# Patient Record
Sex: FEMALE | Race: WHITE | NOT HISPANIC OR LATINO | Employment: FULL TIME | URBAN - METROPOLITAN AREA
[De-identification: names, ages, dates, MRNs, and addresses within clinical notes are randomized per-mention and may not be internally consistent; named-entity substitution may affect disease eponyms.]

---

## 2017-07-24 ENCOUNTER — TRANSCRIBE ORDERS (OUTPATIENT)
Dept: ADMINISTRATIVE | Facility: HOSPITAL | Age: 62
End: 2017-07-24

## 2017-07-24 DIAGNOSIS — Z12.31 VISIT FOR SCREENING MAMMOGRAM: Primary | ICD-10-CM

## 2017-08-01 ENCOUNTER — HOSPITAL ENCOUNTER (OUTPATIENT)
Dept: RADIOLOGY | Facility: HOSPITAL | Age: 62
Discharge: HOME | End: 2017-08-01
Attending: INTERNAL MEDICINE
Payer: COMMERCIAL

## 2017-08-01 ENCOUNTER — TRANSCRIBE ORDERS (OUTPATIENT)
Dept: ADMINISTRATIVE | Facility: HOSPITAL | Age: 62
End: 2017-08-01

## 2017-08-01 ENCOUNTER — HOSPITAL ENCOUNTER (OUTPATIENT)
Dept: RADIOLOGY | Facility: HOSPITAL | Age: 62
Discharge: HOME | End: 2017-08-01
Attending: OBSTETRICS & GYNECOLOGY
Payer: COMMERCIAL

## 2017-08-01 DIAGNOSIS — M79.7 RHEUMATISM, UNSPECIFIED AND FIBROSITIS: Primary | ICD-10-CM

## 2017-08-01 DIAGNOSIS — M79.0 RHEUMATISM, UNSPECIFIED AND FIBROSITIS: Primary | ICD-10-CM

## 2017-08-01 DIAGNOSIS — M79.673 PAIN OF FOOT, UNSPECIFIED LATERALITY: ICD-10-CM

## 2017-08-01 DIAGNOSIS — Z12.31 VISIT FOR SCREENING MAMMOGRAM: ICD-10-CM

## 2017-08-01 PROCEDURE — 73630 X-RAY EXAM OF FOOT: CPT

## 2017-08-01 PROCEDURE — G0202 SCR MAMMO BI INCL CAD: HCPCS

## 2018-11-21 ENCOUNTER — TRANSCRIBE ORDERS (OUTPATIENT)
Dept: ADMINISTRATIVE | Facility: HOSPITAL | Age: 63
End: 2018-11-21

## 2018-11-21 DIAGNOSIS — Z12.31 ENCOUNTER FOR SCREENING MAMMOGRAM FOR MALIGNANT NEOPLASM OF BREAST: Primary | ICD-10-CM

## 2018-12-05 ENCOUNTER — HOSPITAL ENCOUNTER (OUTPATIENT)
Dept: RADIOLOGY | Facility: HOSPITAL | Age: 63
Discharge: HOME | End: 2018-12-05
Attending: OBSTETRICS & GYNECOLOGY
Payer: COMMERCIAL

## 2018-12-05 VITALS — BODY MASS INDEX: 41.83 KG/M2 | HEIGHT: 64 IN | WEIGHT: 245 LBS

## 2018-12-05 DIAGNOSIS — Z12.31 ENCOUNTER FOR SCREENING MAMMOGRAM FOR MALIGNANT NEOPLASM OF BREAST: ICD-10-CM

## 2018-12-05 PROCEDURE — 77067 SCR MAMMO BI INCL CAD: CPT

## 2019-05-10 ENCOUNTER — OFFICE VISIT (OUTPATIENT)
Dept: OBGYN CLINIC | Facility: CLINIC | Age: 64
End: 2019-05-10
Payer: COMMERCIAL

## 2019-05-10 ENCOUNTER — APPOINTMENT (OUTPATIENT)
Dept: RADIOLOGY | Facility: CLINIC | Age: 64
End: 2019-05-10
Payer: COMMERCIAL

## 2019-05-10 VITALS
WEIGHT: 238.4 LBS | BODY MASS INDEX: 40.7 KG/M2 | HEART RATE: 108 BPM | DIASTOLIC BLOOD PRESSURE: 73 MMHG | SYSTOLIC BLOOD PRESSURE: 111 MMHG | HEIGHT: 64 IN

## 2019-05-10 DIAGNOSIS — M17.11 PRIMARY OSTEOARTHRITIS OF RIGHT KNEE: Primary | ICD-10-CM

## 2019-05-10 DIAGNOSIS — M25.561 RIGHT KNEE PAIN, UNSPECIFIED CHRONICITY: ICD-10-CM

## 2019-05-10 PROCEDURE — 20610 DRAIN/INJ JOINT/BURSA W/O US: CPT | Performed by: ORTHOPAEDIC SURGERY

## 2019-05-10 PROCEDURE — 99203 OFFICE O/P NEW LOW 30 MIN: CPT | Performed by: ORTHOPAEDIC SURGERY

## 2019-05-10 PROCEDURE — 73562 X-RAY EXAM OF KNEE 3: CPT

## 2019-05-10 RX ORDER — LOSARTAN POTASSIUM 50 MG/1
TABLET ORAL
COMMUNITY
Start: 2019-04-27 | End: 2021-04-14

## 2019-05-10 RX ORDER — BUPIVACAINE HYDROCHLORIDE 5 MG/ML
6 INJECTION, SOLUTION EPIDURAL; INTRACAUDAL
Status: COMPLETED | OUTPATIENT
Start: 2019-05-10 | End: 2019-05-10

## 2019-05-10 RX ORDER — TRIAMCINOLONE ACETONIDE 40 MG/ML
80 INJECTION, SUSPENSION INTRA-ARTICULAR; INTRAMUSCULAR
Status: COMPLETED | OUTPATIENT
Start: 2019-05-10 | End: 2019-05-10

## 2019-05-10 RX ORDER — GABAPENTIN 100 MG/1
CAPSULE ORAL
COMMUNITY
Start: 2019-05-06 | End: 2021-04-14

## 2019-05-10 RX ORDER — PANTOPRAZOLE SODIUM 40 MG/1
TABLET, DELAYED RELEASE ORAL
COMMUNITY
Start: 2019-03-09

## 2019-05-10 RX ORDER — SITAGLIPTIN AND METFORMIN HYDROCHLORIDE 1000; 50 MG/1; MG/1
TABLET, FILM COATED ORAL
COMMUNITY
Start: 2019-04-09

## 2019-05-10 RX ORDER — DULOXETIN HYDROCHLORIDE 60 MG/1
CAPSULE, DELAYED RELEASE ORAL
COMMUNITY
Start: 2019-03-17

## 2019-05-10 RX ORDER — CYCLOBENZAPRINE HCL 10 MG
TABLET ORAL
COMMUNITY
Start: 2019-05-06 | End: 2021-04-14

## 2019-05-10 RX ADMIN — TRIAMCINOLONE ACETONIDE 80 MG: 40 INJECTION, SUSPENSION INTRA-ARTICULAR; INTRAMUSCULAR at 11:20

## 2019-05-10 RX ADMIN — BUPIVACAINE HYDROCHLORIDE 6 ML: 5 INJECTION, SOLUTION EPIDURAL; INTRACAUDAL at 11:20

## 2019-08-09 ENCOUNTER — OFFICE VISIT (OUTPATIENT)
Dept: OBGYN CLINIC | Facility: CLINIC | Age: 64
End: 2019-08-09
Payer: COMMERCIAL

## 2019-08-09 VITALS
SYSTOLIC BLOOD PRESSURE: 109 MMHG | WEIGHT: 237.6 LBS | HEART RATE: 82 BPM | BODY MASS INDEX: 40.56 KG/M2 | HEIGHT: 64 IN | DIASTOLIC BLOOD PRESSURE: 65 MMHG

## 2019-08-09 DIAGNOSIS — M25.561 CHRONIC PAIN OF RIGHT KNEE: ICD-10-CM

## 2019-08-09 DIAGNOSIS — E11.9 CONTROLLED TYPE 2 DIABETES MELLITUS WITHOUT COMPLICATION, WITHOUT LONG-TERM CURRENT USE OF INSULIN (HCC): ICD-10-CM

## 2019-08-09 DIAGNOSIS — G89.29 CHRONIC PAIN OF RIGHT KNEE: ICD-10-CM

## 2019-08-09 DIAGNOSIS — M17.11 PRIMARY OSTEOARTHRITIS OF RIGHT KNEE: Primary | ICD-10-CM

## 2019-08-09 PROBLEM — E78.49 OTHER HYPERLIPIDEMIA: Status: ACTIVE | Noted: 2019-08-09

## 2019-08-09 PROBLEM — K21.9 GASTROESOPHAGEAL REFLUX DISEASE WITHOUT ESOPHAGITIS: Status: ACTIVE | Noted: 2019-08-09

## 2019-08-09 PROBLEM — Z96.652 HISTORY OF LEFT KNEE REPLACEMENT: Status: ACTIVE | Noted: 2019-08-09

## 2019-08-09 PROBLEM — I10 ESSENTIAL HYPERTENSION: Status: ACTIVE | Noted: 2019-08-09

## 2019-08-09 PROCEDURE — 99213 OFFICE O/P EST LOW 20 MIN: CPT | Performed by: PHYSICIAN ASSISTANT

## 2019-08-09 PROCEDURE — 20610 DRAIN/INJ JOINT/BURSA W/O US: CPT | Performed by: PHYSICIAN ASSISTANT

## 2019-08-09 RX ORDER — BUPIVACAINE HYDROCHLORIDE 5 MG/ML
6 INJECTION, SOLUTION EPIDURAL; INTRACAUDAL
Status: COMPLETED | OUTPATIENT
Start: 2019-08-09 | End: 2019-08-09

## 2019-08-09 RX ORDER — TRIAMCINOLONE ACETONIDE 40 MG/ML
80 INJECTION, SUSPENSION INTRA-ARTICULAR; INTRAMUSCULAR
Status: COMPLETED | OUTPATIENT
Start: 2019-08-09 | End: 2019-08-09

## 2019-08-09 RX ADMIN — TRIAMCINOLONE ACETONIDE 80 MG: 40 INJECTION, SUSPENSION INTRA-ARTICULAR; INTRAMUSCULAR at 15:39

## 2019-08-09 RX ADMIN — BUPIVACAINE HYDROCHLORIDE 6 ML: 5 INJECTION, SOLUTION EPIDURAL; INTRACAUDAL at 15:39

## 2019-08-09 NOTE — PROGRESS NOTES
Assessment/Plan:  1  Primary osteoarthritis of right knee  Large joint arthrocentesis   2  Chronic pain of right knee  Large joint arthrocentesis   3  Controlled type 2 diabetes mellitus without complication, without long-term current use of insulin ([facility] )       Colonel Barnard is a pleasant 72-year-old female presenting today for follow-up of her activity related right knee pain due to her moderate underlying osteoarthritis   She did very well with a previous cortisone injection 3 months ago and did not have significant increase in her blood sugars  The injection lasted until last weekend, when the pain returned to the level it was prior to the previous injection  She consented to and underwent a repeat right knee cortisone injection today without difficulty or complication  Post injection instructions were provided  We will plan to see her back in 3-4 months pending the efficacy of today's injection  All of her questions were addressed today  Large joint arthrocentesis: R knee  Date/Time: 8/9/2019 3:39 PM  Consent given by: patient  Site marked: site marked  Timeout: Immediately prior to procedure a time out was called to verify the correct patient, procedure, equipment, support staff and site/side marked as required   Supporting Documentation  Indications: pain   Procedure Details  Location: knee - R knee  Preparation: Patient was prepped and draped in the usual sterile fashion  Needle size: 20 G  Ultrasound guidance: no  Approach: anterolateral  Medications administered: 6 mL bupivacaine (PF) 0 5 %; 80 mg triamcinolone acetonide 40 mg/mL    Patient tolerance: patient tolerated the procedure well with no immediate complications  Dressing:  Sterile dressing applied          Subjective: Right knee follow up    Patient ID: Eldon Thierry is a 60 y o  female  Colonel Barnard is a pleasant 72-year-old female presenting today for follow-up of her activity related right knee pain    She underwent a right knee cortisone injection for her underlying moderate arthritis 3 months ago, which lasted up until last weekend  She is interested in undergoing a repeat injection today as she has her daughter's wedding coming up in early October  She denies any new injuries or symptoms  The pain has returned to the level that it was prior to the last injection  Review of Systems   Constitutional: Negative  HENT: Negative  Eyes: Negative  Respiratory: Negative  Cardiovascular: Negative  Gastrointestinal: Negative  Endocrine: Negative  Genitourinary: Negative  Musculoskeletal: Positive for arthralgias  Skin: Negative  Allergic/Immunologic: Negative  Neurological: Negative  Hematological: Negative  Psychiatric/Behavioral: Negative            Past Medical History:   Diagnosis Date    Cardiac disease     Diabetes mellitus (HCC)     GERD (gastroesophageal reflux disease)        Past Surgical History:   Procedure Laterality Date    CARDIAC PACEMAKER PLACEMENT       SECTION      x 2    JOINT REPLACEMENT         Family History   Problem Relation Age of Onset    Parkinsonism Mother     No Known Problems Father     No Known Problems Sister     No Known Problems Brother     No Known Problems Maternal Aunt     No Known Problems Maternal Uncle     No Known Problems Paternal Aunt     No Known Problems Paternal Uncle     No Known Problems Maternal Grandmother     No Known Problems Maternal Grandfather     No Known Problems Paternal Grandmother     No Known Problems Paternal Grandfather     ADD / ADHD Neg Hx     Anesthesia problems Neg Hx     Cancer Neg Hx     Clotting disorder Neg Hx     Collagen disease Neg Hx     Diabetes Neg Hx     Dislocations Neg Hx     Learning disabilities Neg Hx     Neurological problems Neg Hx     Osteoporosis Neg Hx     Rheumatologic disease Neg Hx     Scoliosis Neg Hx     Vascular Disease Neg Hx        Social History     Occupational History    Not on file   Tobacco Use    Smoking status: Never Smoker    Smokeless tobacco: Never Used   Substance and Sexual Activity    Alcohol use: No     Comment: socail     Drug use: No    Sexual activity: Not on file         Current Outpatient Medications:     aspirin 81 mg chewable tablet, Chew 81 mg daily  , Disp: , Rfl:     cyclobenzaprine (FLEXERIL) 10 mg tablet, , Disp: , Rfl:     DULoxetine (CYMBALTA) 60 mg delayed release capsule, , Disp: , Rfl:     gabapentin (NEURONTIN) 100 mg capsule, , Disp: , Rfl:     JANUMET  MG per tablet, , Disp: , Rfl:     losartan (COZAAR) 50 mg tablet, , Disp: , Rfl:     metoprolol succinate (TOPROL-XL) 50 mg 24 hr tablet, Take 50 mg by mouth daily  50 mg in am and 25 mg in the evening, Disp: , Rfl:     pantoprazole (PROTONIX) 40 mg tablet, , Disp: , Rfl:     simvastatin (ZOCOR) 20 mg tablet, Take 20 mg by mouth daily at bedtime  , Disp: , Rfl:     Allergies   Allergen Reactions    Codeine GI Intolerance    Morphine     Penicillins Other (See Comments)     unknown       Objective:  Vitals:    08/09/19 1519   BP: 109/65   Pulse: 82       Body mass index is 40 78 kg/m²  Right Knee Exam     Muscle Strength   The patient has normal right knee strength  Tenderness   The patient is experiencing tenderness in the medial joint line  Range of Motion   Extension:  0 normal   Flexion:  130 normal     Tests   Perla:  Medial - negative Lateral - negative  Varus: negative Valgus: negative  Drawer:  Anterior - negative    Posterior - negative  Patellar apprehension: negative    Other   Erythema: absent  Scars: absent  Sensation: normal  Pulse: present  Swelling: none  Effusion: no effusion present    Comments:  Peripatellar crepitus  Increased adipost tissues around right knee   + patellofemoral grind   Knee stable at 0, 30, 90 degrees  Ambulates with a normal symmetrical gait              Observations     Right Knee   Negative for effusion         Physical Exam Constitutional: She is oriented to person, place, and time  She appears well-developed and well-nourished  Body mass index is 40 78 kg/m²  HENT:   Head: Normocephalic and atraumatic  Eyes: EOM are normal    Neck: Normal range of motion  Cardiovascular: Intact distal pulses  Pulmonary/Chest: Effort normal    Musculoskeletal:        Right knee: She exhibits no effusion  See ortho exam   Neurological: She is alert and oriented to person, place, and time  Skin: Skin is warm and dry  Psychiatric: She has a normal mood and affect  Her behavior is normal  Judgment and thought content normal    Nursing note and vitals reviewed

## 2019-09-24 ENCOUNTER — OFFICE VISIT (OUTPATIENT)
Dept: OBGYN CLINIC | Facility: CLINIC | Age: 64
End: 2019-09-24
Payer: COMMERCIAL

## 2019-09-24 VITALS
SYSTOLIC BLOOD PRESSURE: 132 MMHG | DIASTOLIC BLOOD PRESSURE: 79 MMHG | HEIGHT: 64 IN | BODY MASS INDEX: 41.15 KG/M2 | WEIGHT: 241 LBS | HEART RATE: 92 BPM

## 2019-09-24 DIAGNOSIS — M17.11 PRIMARY OSTEOARTHRITIS OF RIGHT KNEE: ICD-10-CM

## 2019-09-24 DIAGNOSIS — M70.51 PES ANSERINUS BURSITIS OF RIGHT KNEE: Primary | ICD-10-CM

## 2019-09-24 DIAGNOSIS — M25.561 CHRONIC PAIN OF RIGHT KNEE: ICD-10-CM

## 2019-09-24 DIAGNOSIS — G89.29 CHRONIC PAIN OF RIGHT KNEE: ICD-10-CM

## 2019-09-24 PROCEDURE — 99213 OFFICE O/P EST LOW 20 MIN: CPT | Performed by: ORTHOPAEDIC SURGERY

## 2019-09-24 NOTE — PROGRESS NOTES
Assessment/Plan:  1  Pes anserinus bursitis of right knee  diclofenac sodium (VOLTAREN) 1 %    Brace   2  Primary osteoarthritis of right knee  diclofenac sodium (VOLTAREN) 1 %    Brace   3  Chronic pain of right knee  diclofenac sodium (VOLTAREN) 1 %    Brace     Anna is a pleasant 68-year-old female seen today for follow-up of her activity related right knee pain  Although she does have moderate to severe underlying osteoarthritis, her current complaints are most consistent with pes anserine bursitis and MCL sprain  The cortisone injection from 6 weeks ago is likely still effective is she does not have lingering activity related soreness in the knee  Due to her history of kidney disease, she cannot take oral NSAIDs  Therefore, we have prescribed her Voltaren gel to use up to 4 times a day on the affected area  Additionally, we will have her fitted for a hinged knee brace to wear while active  She expressed understanding  We will plan to see her back in a month for clinical re-evaluation  All questions addressed  Subjective: Right knee follow up    Patient ID: Devyn Silva is a 60 y o  female  Josef Mejia is a pleasant 68-year-old female presenting today for follow-up of her activity related right knee pain  We have been treating her osteoarthritis with periodic cortisone injections  Her last injection was 6 weeks ago  She did see improvement, but has had intermittent medial-sided discomfort  She has her with bending the knee, stairs, and he leaning over  She gets pain shooting from the medial side of her knee up towards her thigh  She denies any locking, buckling, or giving way  She denies any new specific injuries  Review of Systems   Constitutional: Negative  HENT: Negative  Eyes: Negative  Respiratory: Negative  Cardiovascular: Negative  Gastrointestinal: Negative  Endocrine: Negative  Genitourinary: Negative  Musculoskeletal: Positive for arthralgias  Skin: Negative  Allergic/Immunologic: Negative  Neurological: Negative  Hematological: Negative  Psychiatric/Behavioral: Negative  Past Medical History:   Diagnosis Date    Cardiac disease     Diabetes mellitus (HCC)     GERD (gastroesophageal reflux disease)        Past Surgical History:   Procedure Laterality Date    CARDIAC PACEMAKER PLACEMENT       SECTION      x 2    JOINT REPLACEMENT         Family History   Problem Relation Age of Onset    Parkinsonism Mother     No Known Problems Father     No Known Problems Sister     No Known Problems Brother     No Known Problems Maternal Aunt     No Known Problems Maternal Uncle     No Known Problems Paternal Aunt     No Known Problems Paternal Uncle     No Known Problems Maternal Grandmother     No Known Problems Maternal Grandfather     No Known Problems Paternal Grandmother     No Known Problems Paternal Grandfather     ADD / ADHD Neg Hx     Anesthesia problems Neg Hx     Cancer Neg Hx     Clotting disorder Neg Hx     Collagen disease Neg Hx     Diabetes Neg Hx     Dislocations Neg Hx     Learning disabilities Neg Hx     Neurological problems Neg Hx     Osteoporosis Neg Hx     Rheumatologic disease Neg Hx     Scoliosis Neg Hx     Vascular Disease Neg Hx        Social History     Occupational History    Not on file   Tobacco Use    Smoking status: Never Smoker    Smokeless tobacco: Never Used   Substance and Sexual Activity    Alcohol use: No     Comment: socail     Drug use: No    Sexual activity: Not on file         Current Outpatient Medications:     aspirin 81 mg chewable tablet, Chew 81 mg daily  , Disp: , Rfl:     cyclobenzaprine (FLEXERIL) 10 mg tablet, , Disp: , Rfl:     DULoxetine (CYMBALTA) 60 mg delayed release capsule, , Disp: , Rfl:     gabapentin (NEURONTIN) 100 mg capsule, , Disp: , Rfl:     JANUMET  MG per tablet, , Disp: , Rfl:     losartan (COZAAR) 50 mg tablet, , Disp: , Rfl:     metoprolol succinate (TOPROL-XL) 50 mg 24 hr tablet, Take 50 mg by mouth daily  50 mg in am and 25 mg in the evening, Disp: , Rfl:     pantoprazole (PROTONIX) 40 mg tablet, , Disp: , Rfl:     simvastatin (ZOCOR) 20 mg tablet, Take 20 mg by mouth daily at bedtime  , Disp: , Rfl:     diclofenac sodium (VOLTAREN) 1 %, Apply 2 g topically 4 (four) times a day, Disp: 1 Tube, Rfl: 1    Allergies   Allergen Reactions    Codeine GI Intolerance    Morphine     Penicillins Other (See Comments)     unknown       Objective:  Vitals:    09/24/19 1553   BP: 132/79   Pulse: 92       Body mass index is 41 37 kg/m²  Right Knee Exam     Muscle Strength   The patient has normal right knee strength  Tenderness   The patient is experiencing tenderness in the medial joint line and pes anserinus  Range of Motion   Extension:  0 normal   Flexion:  120 normal     Tests   Perla:  Medial - negative Lateral - negative  Varus: negative Valgus: negative  Drawer:  Anterior - negative    Posterior - negative  Patellar apprehension: negative    Other   Erythema: absent  Scars: absent  Sensation: normal  Pulse: present  Swelling: none  Effusion: no effusion present    Comments:  Peripatellar crepitus  Increased adipose tissues around right knee   - patellofemoral grind   Knee stable at 0, 30, 90 degrees  Ambulates with a normal symmetrical gait          Observations     Right Knee   Negative for effusion  Physical Exam   Constitutional: She is oriented to person, place, and time  She appears well-developed and well-nourished  Body mass index is 41 37 kg/m²  HENT:   Head: Normocephalic and atraumatic  Eyes: EOM are normal    Neck: Normal range of motion  Cardiovascular: Intact distal pulses  Pulmonary/Chest: Effort normal    Musculoskeletal:        Right knee: She exhibits no effusion  See ortho exam   Neurological: She is alert and oriented to person, place, and time  Skin: Skin is warm and dry  Psychiatric: She has a normal mood and affect  Her behavior is normal  Judgment and thought content normal    Nursing note and vitals reviewed

## 2019-12-06 ENCOUNTER — TRANSCRIBE ORDERS (OUTPATIENT)
Dept: ADMINISTRATIVE | Facility: HOSPITAL | Age: 64
End: 2019-12-06

## 2019-12-06 DIAGNOSIS — Z12.31 SCREENING MAMMOGRAM FOR HIGH-RISK PATIENT: Primary | ICD-10-CM

## 2020-01-17 ENCOUNTER — OFFICE VISIT (OUTPATIENT)
Dept: OBGYN CLINIC | Facility: CLINIC | Age: 65
End: 2020-01-17
Payer: COMMERCIAL

## 2020-01-17 VITALS
DIASTOLIC BLOOD PRESSURE: 75 MMHG | HEART RATE: 91 BPM | SYSTOLIC BLOOD PRESSURE: 114 MMHG | HEIGHT: 64 IN | BODY MASS INDEX: 40.97 KG/M2 | WEIGHT: 240 LBS

## 2020-01-17 DIAGNOSIS — M70.51 PES ANSERINUS BURSITIS OF RIGHT KNEE: ICD-10-CM

## 2020-01-17 DIAGNOSIS — E11.9 CONTROLLED TYPE 2 DIABETES MELLITUS WITHOUT COMPLICATION, WITHOUT LONG-TERM CURRENT USE OF INSULIN (HCC): ICD-10-CM

## 2020-01-17 DIAGNOSIS — G89.29 CHRONIC PAIN OF RIGHT KNEE: ICD-10-CM

## 2020-01-17 DIAGNOSIS — M25.561 CHRONIC PAIN OF RIGHT KNEE: ICD-10-CM

## 2020-01-17 DIAGNOSIS — M70.50 PES ANSERINE BURSITIS: ICD-10-CM

## 2020-01-17 DIAGNOSIS — M17.11 PRIMARY OSTEOARTHRITIS OF RIGHT KNEE: Primary | ICD-10-CM

## 2020-01-17 PROCEDURE — 99213 OFFICE O/P EST LOW 20 MIN: CPT | Performed by: PHYSICIAN ASSISTANT

## 2020-01-17 PROCEDURE — 20610 DRAIN/INJ JOINT/BURSA W/O US: CPT | Performed by: PHYSICIAN ASSISTANT

## 2020-01-17 RX ORDER — TRIAMCINOLONE ACETONIDE 40 MG/ML
80 INJECTION, SUSPENSION INTRA-ARTICULAR; INTRAMUSCULAR
Status: COMPLETED | OUTPATIENT
Start: 2020-01-17 | End: 2020-01-17

## 2020-01-17 RX ORDER — BUPIVACAINE HYDROCHLORIDE 5 MG/ML
6 INJECTION, SOLUTION EPIDURAL; INTRACAUDAL
Status: COMPLETED | OUTPATIENT
Start: 2020-01-17 | End: 2020-01-17

## 2020-01-17 RX ADMIN — TRIAMCINOLONE ACETONIDE 80 MG: 40 INJECTION, SUSPENSION INTRA-ARTICULAR; INTRAMUSCULAR at 14:36

## 2020-01-17 RX ADMIN — BUPIVACAINE HYDROCHLORIDE 6 ML: 5 INJECTION, SOLUTION EPIDURAL; INTRACAUDAL at 14:36

## 2020-01-17 NOTE — PROGRESS NOTES
Assessment/Plan:  1  Primary osteoarthritis of right knee  Large joint arthrocentesis: R knee    diclofenac sodium (VOLTAREN) 1 %   2  Chronic pain of right knee  Large joint arthrocentesis: R knee    diclofenac sodium (VOLTAREN) 1 %   3  Pes anserine bursitis     4  Controlled type 2 diabetes mellitus without complication, without long-term current use of insulin ([address] )     5  Pes anserinus bursitis of right knee  diclofenac sodium (VOLTAREN) 1 %     Emilia Romo is a pleasant 73-year-old female presenting today for follow-up of her activity related right knee pain due to her moderate underlying osteoarthritis and pes anserine bursitis  She has been doing well with periodic injection therapy as well as Voltaren gel for the pes anserine bursitis  The gel was refilled for her today  Additionally, she consented to and underwent a right knee cortisone injection as detailed below, which she tolerated well without difficulty or complication  Post injection instructions were provided  She is aware there may cause a transient increase in her blood sugars  We will plan to see her back in 3-4 months pending the efficacy of today's injection  She expressed understanding all of her questions were addressed today      Large joint arthrocentesis: R knee  Date/Time: 1/17/2020 2:36 PM  Consent given by: patient  Site marked: site marked  Timeout: Immediately prior to procedure a time out was called to verify the correct patient, procedure, equipment, support staff and site/side marked as required   Supporting Documentation  Indications: pain   Procedure Details  Location: knee - R knee  Preparation: Patient was prepped and draped in the usual sterile fashion  Needle size: 20 G  Ultrasound guidance: no  Approach: anterolateral  Medications administered: 6 mL bupivacaine (PF) 0 5 %; 80 mg triamcinolone acetonide 40 mg/mL    Patient tolerance: patient tolerated the procedure well with no immediate complications  Dressing:  Sterile dressing applied            Subjective:  Right knee follow-up    Patient ID: Adrian Galeano is a 60 y o  female  Trang Beltran is a pleasant 46-year-old female presenting today for follow-up her activity related right knee pain due to her underlying moderate osteoarthritis and pes anserine bursitis  She has done very well with Voltaren gel since her last appointment, and is using at 2 to 3 times a day as needed  Additionally, she did very well from the cortisone injection that was administered  last year  However, she has had a recurrence of her activity related discomfort over the past week that has not been mitigated with Voltaren gel  She is interested today in a repeat cortisone injection if possible  Review of Systems   Constitutional: Negative  HENT: Negative  Eyes: Negative  Respiratory: Negative  Cardiovascular: Negative  Gastrointestinal: Negative  Endocrine: Negative  Genitourinary: Negative  Musculoskeletal: Positive for myalgias  Skin: Negative  Allergic/Immunologic: Negative  Neurological: Negative  Hematological: Negative  Psychiatric/Behavioral: Negative            Past Medical History:   Diagnosis Date    Cardiac disease     Diabetes mellitus (HCC)     GERD (gastroesophageal reflux disease)        Past Surgical History:   Procedure Laterality Date    CARDIAC PACEMAKER PLACEMENT       SECTION      x 2    JOINT REPLACEMENT         Family History   Problem Relation Age of Onset    Parkinsonism Mother     No Known Problems Father     No Known Problems Sister     No Known Problems Brother     No Known Problems Maternal Aunt     No Known Problems Maternal Uncle     No Known Problems Paternal Aunt     No Known Problems Paternal Uncle     No Known Problems Maternal Grandmother     No Known Problems Maternal Grandfather     No Known Problems Paternal Grandmother     No Known Problems Paternal Grandfather     ADD / ADHD Neg Hx     Anesthesia problems Neg Hx     Cancer Neg Hx     Clotting disorder Neg Hx     Collagen disease Neg Hx     Diabetes Neg Hx     Dislocations Neg Hx     Learning disabilities Neg Hx     Neurological problems Neg Hx     Osteoporosis Neg Hx     Rheumatologic disease Neg Hx     Scoliosis Neg Hx     Vascular Disease Neg Hx        Social History     Occupational History    Not on file   Tobacco Use    Smoking status: Never Smoker    Smokeless tobacco: Never Used   Substance and Sexual Activity    Alcohol use: No     Comment: socail     Drug use: No    Sexual activity: Not on file         Current Outpatient Medications:     aspirin 81 mg chewable tablet, Chew 81 mg daily  , Disp: , Rfl:     cyclobenzaprine (FLEXERIL) 10 mg tablet, , Disp: , Rfl:     diclofenac sodium (VOLTAREN) 1 %, Apply 2 g topically 4 (four) times a day, Disp: 1 Tube, Rfl: 1    DULoxetine (CYMBALTA) 60 mg delayed release capsule, , Disp: , Rfl:     gabapentin (NEURONTIN) 100 mg capsule, , Disp: , Rfl:     JANUMET  MG per tablet, , Disp: , Rfl:     losartan (COZAAR) 50 mg tablet, , Disp: , Rfl:     metoprolol succinate (TOPROL-XL) 50 mg 24 hr tablet, Take 50 mg by mouth daily  50 mg in am and 25 mg in the evening, Disp: , Rfl:     pantoprazole (PROTONIX) 40 mg tablet, , Disp: , Rfl:     simvastatin (ZOCOR) 20 mg tablet, Take 20 mg by mouth daily at bedtime  , Disp: , Rfl:     Allergies   Allergen Reactions    Codeine GI Intolerance    Morphine     Penicillins Other (See Comments)     unknown       Objective:  Vitals:    01/17/20 1411   BP: 114/75   Pulse: 91       Body mass index is 41 2 kg/m²  Right Knee Exam     Muscle Strength   The patient has normal right knee strength  Tenderness   The patient is experiencing tenderness in the medial joint line, pes anserinus, patella and medial retinaculum      Range of Motion   Extension:  0 normal   Flexion:  120 normal     Tests   Perla:  Medial - negative Lateral - negative  Varus: negative Valgus: negative  Drawer:  Anterior - negative    Posterior - negative  Patellar apprehension: negative    Other   Erythema: absent  Scars: absent  Sensation: normal  Pulse: present  Swelling: none  Effusion: no effusion present    Comments:  Peripatellar crepitus  Increased adipose tissues around right knee, no appreciable effusion  - patellofemoral grind   Knee stable at 0, 30, 90 degrees  Ambulates with a normal symmetrical gait          Observations     Right Knee   Negative for effusion  Physical Exam   Constitutional: She is oriented to person, place, and time  She appears well-developed and well-nourished  Body mass index is 41 2 kg/m²  HENT:   Head: Normocephalic and atraumatic  Eyes: EOM are normal    Neck: Normal range of motion  Cardiovascular: Intact distal pulses  Pulmonary/Chest: Effort normal    Musculoskeletal:        Right knee: She exhibits no effusion  See ortho exam   Neurological: She is alert and oriented to person, place, and time  Skin: Skin is warm and dry  Psychiatric: She has a normal mood and affect  Her behavior is normal  Judgment and thought content normal    Nursing note and vitals reviewed

## 2020-01-20 ENCOUNTER — TRANSCRIBE ORDERS (OUTPATIENT)
Dept: ADMINISTRATIVE | Facility: HOSPITAL | Age: 65
End: 2020-01-20

## 2020-10-21 ENCOUNTER — HOSPITAL ENCOUNTER (EMERGENCY)
Facility: HOSPITAL | Age: 65
Discharge: HOME | End: 2020-10-21
Attending: EMERGENCY MEDICINE | Admitting: EMERGENCY MEDICINE
Payer: COMMERCIAL

## 2020-10-21 ENCOUNTER — APPOINTMENT (EMERGENCY)
Dept: RADIOLOGY | Facility: HOSPITAL | Age: 65
End: 2020-10-21
Payer: COMMERCIAL

## 2020-10-21 VITALS
RESPIRATION RATE: 16 BRPM | HEART RATE: 60 BPM | OXYGEN SATURATION: 98 % | DIASTOLIC BLOOD PRESSURE: 78 MMHG | SYSTOLIC BLOOD PRESSURE: 132 MMHG | TEMPERATURE: 97.2 F

## 2020-10-21 DIAGNOSIS — M25.561 RIGHT KNEE PAIN: Primary | ICD-10-CM

## 2020-10-21 DIAGNOSIS — M19.90 OSTEOARTHRITIS: ICD-10-CM

## 2020-10-21 PROCEDURE — 96372 THER/PROPH/DIAG INJ SC/IM: CPT

## 2020-10-21 PROCEDURE — 99283 EMERGENCY DEPT VISIT LOW MDM: CPT

## 2020-10-21 PROCEDURE — 73564 X-RAY EXAM KNEE 4 OR MORE: CPT

## 2020-10-21 PROCEDURE — 99284 EMERGENCY DEPT VISIT MOD MDM: CPT | Performed by: EMERGENCY MEDICINE

## 2020-10-21 RX ORDER — LIDOCAINE 50 MG/G
1 PATCH TOPICAL ONCE
Status: DISCONTINUED | OUTPATIENT
Start: 2020-10-21 | End: 2020-10-21 | Stop reason: HOSPADM

## 2020-10-21 RX ORDER — KETOROLAC TROMETHAMINE 30 MG/ML
15 INJECTION, SOLUTION INTRAMUSCULAR; INTRAVENOUS ONCE
Status: COMPLETED | OUTPATIENT
Start: 2020-10-21 | End: 2020-10-21

## 2020-10-21 RX ADMIN — LIDOCAINE 1 PATCH: 50 PATCH TOPICAL at 17:47

## 2020-10-21 RX ADMIN — KETOROLAC TROMETHAMINE 15 MG: 30 INJECTION, SOLUTION INTRAMUSCULAR at 17:48

## 2020-10-22 ENCOUNTER — OFFICE VISIT (OUTPATIENT)
Dept: OBGYN CLINIC | Facility: CLINIC | Age: 65
End: 2020-10-22
Payer: COMMERCIAL

## 2020-10-22 VITALS
SYSTOLIC BLOOD PRESSURE: 126 MMHG | WEIGHT: 234 LBS | HEART RATE: 63 BPM | HEIGHT: 64 IN | DIASTOLIC BLOOD PRESSURE: 74 MMHG | BODY MASS INDEX: 39.95 KG/M2

## 2020-10-22 DIAGNOSIS — M25.561 CHRONIC PAIN OF RIGHT KNEE: ICD-10-CM

## 2020-10-22 DIAGNOSIS — M17.11 PRIMARY OSTEOARTHRITIS OF RIGHT KNEE: Primary | ICD-10-CM

## 2020-10-22 DIAGNOSIS — G89.29 CHRONIC PAIN OF RIGHT KNEE: ICD-10-CM

## 2020-10-22 PROCEDURE — 20610 DRAIN/INJ JOINT/BURSA W/O US: CPT | Performed by: ORTHOPAEDIC SURGERY

## 2020-10-22 PROCEDURE — 99214 OFFICE O/P EST MOD 30 MIN: CPT | Performed by: ORTHOPAEDIC SURGERY

## 2020-10-22 RX ORDER — TRIAMCINOLONE ACETONIDE 40 MG/ML
80 INJECTION, SUSPENSION INTRA-ARTICULAR; INTRAMUSCULAR
Status: COMPLETED | OUTPATIENT
Start: 2020-10-22 | End: 2020-10-22

## 2020-10-22 RX ORDER — BUPIVACAINE HYDROCHLORIDE 5 MG/ML
6 INJECTION, SOLUTION EPIDURAL; INTRACAUDAL
Status: COMPLETED | OUTPATIENT
Start: 2020-10-22 | End: 2020-10-22

## 2020-10-22 RX ADMIN — BUPIVACAINE HYDROCHLORIDE 6 ML: 5 INJECTION, SOLUTION EPIDURAL; INTRACAUDAL at 11:23

## 2020-10-22 RX ADMIN — TRIAMCINOLONE ACETONIDE 80 MG: 40 INJECTION, SUSPENSION INTRA-ARTICULAR; INTRAMUSCULAR at 11:23

## 2020-11-17 ENCOUNTER — HOSPITAL ENCOUNTER (OUTPATIENT)
Dept: RADIOLOGY | Facility: HOSPITAL | Age: 65
Discharge: HOME | End: 2020-11-17
Attending: OBSTETRICS & GYNECOLOGY
Payer: COMMERCIAL

## 2020-11-17 VITALS — BODY MASS INDEX: 37.15 KG/M2 | WEIGHT: 223 LBS | HEIGHT: 65 IN

## 2020-11-17 DIAGNOSIS — Z12.31 SCREENING MAMMOGRAM FOR HIGH-RISK PATIENT: ICD-10-CM

## 2020-11-17 PROCEDURE — 77063 BREAST TOMOSYNTHESIS BI: CPT

## 2020-11-17 PROCEDURE — 77067 SCR MAMMO BI INCL CAD: CPT

## 2020-11-19 ENCOUNTER — APPOINTMENT (EMERGENCY)
Dept: RADIOLOGY | Facility: HOSPITAL | Age: 65
End: 2020-11-19
Payer: COMMERCIAL

## 2020-11-19 ENCOUNTER — HOSPITAL ENCOUNTER (EMERGENCY)
Facility: HOSPITAL | Age: 65
Discharge: HOME | End: 2020-11-19
Attending: EMERGENCY MEDICINE | Admitting: EMERGENCY MEDICINE
Payer: COMMERCIAL

## 2020-11-19 VITALS
RESPIRATION RATE: 18 BRPM | TEMPERATURE: 97.3 F | HEART RATE: 61 BPM | SYSTOLIC BLOOD PRESSURE: 126 MMHG | OXYGEN SATURATION: 98 % | DIASTOLIC BLOOD PRESSURE: 56 MMHG
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
## 2020-11-19 DIAGNOSIS — R51.9 HEADACHE: ICD-10-CM

## 2020-11-19 DIAGNOSIS — M25.551 RIGHT HIP PAIN: ICD-10-CM

## 2020-11-19 PROCEDURE — 71046 X-RAY EXAM CHEST 2 VIEWS: CPT

## 2020-11-19 PROCEDURE — 99284 EMERGENCY DEPT VISIT MOD MDM: CPT

## 2020-11-19 PROCEDURE — 73502 X-RAY EXAM HIP UNI 2-3 VIEWS: CPT

## 2020-11-19 PROCEDURE — 99284 EMERGENCY DEPT VISIT MOD MDM: CPT | Performed by: EMERGENCY MEDICINE

## 2020-11-19 PROCEDURE — 70450 CT HEAD/BRAIN W/O DYE: CPT

## 2020-11-19 PROCEDURE — 72125 CT NECK SPINE W/O DYE: CPT

## 2020-11-19 PROCEDURE — G1004 CDSM NDSC: HCPCS

## 2020-11-19 RX ORDER — CYCLOBENZAPRINE HCL 10 MG
10 TABLET ORAL ONCE
Status: COMPLETED | OUTPATIENT
Start: 2020-11-19 | End: 2020-11-19

## 2020-11-19 RX ORDER — PREDNISONE 50 MG/1
50 TABLET ORAL DAILY
Qty: 5 TABLET | Refills: 0 | Status: SHIPPED | OUTPATIENT
Start: 2020-11-19 | End: 2020-11-24

## 2020-11-19 RX ORDER — NAPROXEN 500 MG/1
500 TABLET ORAL ONCE
Status: COMPLETED | OUTPATIENT
Start: 2020-11-19 | End: 2020-11-19

## 2020-11-19 RX ORDER — NAPROXEN 500 MG/1
500 TABLET ORAL 2 TIMES DAILY WITH MEALS
Qty: 20 TABLET | Refills: 0 | Status: SHIPPED | OUTPATIENT
Start: 2020-11-19 | End: 2021-04-14

## 2020-11-19 RX ORDER — CYCLOBENZAPRINE HCL 10 MG
10 TABLET ORAL 2 TIMES DAILY PRN
Qty: 20 TABLET | Refills: 0 | Status: SHIPPED | OUTPATIENT
Start: 2020-11-19

## 2020-11-19 RX ADMIN — NAPROXEN 500 MG: 500 TABLET ORAL at 14:49

## 2020-11-19 RX ADMIN — CYCLOBENZAPRINE HYDROCHLORIDE 10 MG: 10 TABLET, FILM COATED ORAL at 14:49

## 2020-11-19 RX ADMIN — PREDNISONE 50 MG: 20 TABLET ORAL at 14:50

## 2021-01-22 ENCOUNTER — OFFICE VISIT (OUTPATIENT)
Dept: OBGYN CLINIC | Facility: CLINIC | Age: 66
End: 2021-01-22
Payer: COMMERCIAL

## 2021-01-22 VITALS
DIASTOLIC BLOOD PRESSURE: 70 MMHG | HEIGHT: 65 IN | WEIGHT: 223 LBS | SYSTOLIC BLOOD PRESSURE: 120 MMHG | HEART RATE: 60 BPM | BODY MASS INDEX: 37.15 KG/M2

## 2021-01-22 DIAGNOSIS — M17.11 PRIMARY OSTEOARTHRITIS OF RIGHT KNEE: Primary | ICD-10-CM

## 2021-01-22 PROCEDURE — 99213 OFFICE O/P EST LOW 20 MIN: CPT | Performed by: ORTHOPAEDIC SURGERY

## 2021-01-22 NOTE — PATIENT INSTRUCTIONS
Knee Exercises   AMBULATORY CARE:   What you need to know about knee exercises:  Knee exercises help strengthen the muscles around your knee  Strong muscles can help reduce pain and decrease your risk of future injury  Knee exercises also help you heal after an injury or surgery  · Start slow  These are beginning exercises  Ask your healthcare provider if you need to see a physical therapist for more advanced exercises  As you get stronger, you may be able to do more sets of each exercise or add weights  · Stop if you feel pain  It is normal to feel some discomfort at first  Regular exercise will help decrease your discomfort over time  · Do the exercises on both legs  Do this so both knees remain strong  · Warm up before you do knee exercises  Walk or ride a stationary bike for 5 or 10 minutes to warm your muscles  How to perform knee stretches safely:  Always stretch before you do strengthening exercises  Do these stretching exercises again after you do the strengthening exercises  Do these stretches 4 or 5 days a week, or as directed  · Standing calf stretch: Face a wall and place both palms flat on the wall, or hold the back of a chair for balance  Keep a slight bend in your knees  Take a big step backward with one leg  Keep your other leg directly under you  Keep both heels flat and press your hips forward  Hold the stretch for 30 seconds, and then relax for 30 seconds  Switch legs  Repeat 2 or 3 times on each leg  · Standing quadriceps stretch:  Stand and place one hand against a wall or hold the back of a chair for balance  With your weight on one leg, bend your other leg and grab your ankle  Bring your heel toward your buttocks  Hold the stretch for 30 to 60 seconds  Switch legs  Repeat 2 or 3 times on each leg  · Sitting hamstring stretch:  Sit with both legs straight in front of you  Do not point or flex your toes   Place your palms on the floor and slide your hands forward until you feel the stretch  Do not round your back  Hold the stretch for 30 seconds  Repeat 2 or 3 times  How to perform knee strengthening exercises safely:  Do these exercises 4 or 5 days a week, or as directed  · Standing half squats:  Stand with your feet shoulder-width apart  Lean your back against a wall or hold the back of a chair for balance, if needed  Slowly sit down about 10 inches, as if you are going to sit in a chair  Your body weight should be mostly over your heels  Hold the squat for 5 seconds, then rise to a standing position  Do 3 sets of 10 squats to strengthen your buttocks and thighs  · Standing hamstring curls: Face a wall and place both palms flat on the wall, or hold the back of a chair for balance  With your weight on one leg, lift your other foot as close to your buttocks as you can  Hold for 5 seconds and then lower your leg  Do 2 sets of 10 curls on each leg  This exercise strengthens the muscles in the back of your thigh  · Standing calf raises:  Face a wall and place both palms flat on the wall, or hold the back of a chair for balance  Stand up straight, and do not lean  Place all your weight on one leg by lifting the other foot off the floor  Raise the heel of the foot that is on the floor as high as you can and then lower it  Do 2 sets of 10 calf raises on each leg to strengthen your calf muscles  · Straight leg lifts:  Lie on your stomach with straight legs  Fold your arms in front of you and rest your head in your arms  Tighten your leg muscles and raise one leg as high as you can  Hold for 5 seconds, then lower your leg  Do 2 sets of 10 lifts on each leg to strengthen your buttocks  · Sitting leg lifts:  Sit in a chair  Slowly straighten and raise one leg  Squeeze your thigh muscles and hold for 5 seconds  Relax and return your foot to the floor  Do 2 sets of 10 lifts on each leg   This helps strengthen the muscles in the front of your thigh  Contact your healthcare provider if:   · You have new pain or your pain becomes worse  · You have questions or concerns about your condition or care  © Copyright [address] Information is for End User's use only and may not be sold, redistributed or otherwise used for commercial purposes  All illustrations and images included in CareNotes® are the copyrighted property of [employer]  or [address] [place], [place] [address]pardeep Springer   The above information is an  only  It is not intended as medical advice for individual conditions or treatments  Talk to your doctor, nurse or pharmacist before following any medical regimen to see if it is safe and effective for you

## 2021-01-22 NOTE — PROGRESS NOTES
Assessment/Plan:  1  Primary osteoarthritis of right knee       Scribe Attestation    I,:  Linda Coreas am acting as a scribe while in the presence of the attending physician :       I,:  Joe Hameed, DO personally performed the services described in this documentation    as scribed in my presence :         Patsie Simmonds is a pleasant 63year old who returns today for follow-up evaluation of her chronic right knee pain  At this time she is still feeling relief from the last steroid injection she received in October 2020  Since she is still feeling relief today I will not give her another steroid injection  She should call back when her pain is constant and untolerable  I provided her with home exercises to do for her knee since she is unable to go to formal physical therapy  Exercises were reviewed with the patient  I will see her back as needed or when her pain increases  She understood and had no further questions  Subjective: Follow-up evaluation for chronic right knee pain    Patient ID: Girish Barreto is a 63 y o  female who presents today for her right knee  She was last seen here in October 2020 where she received a steroid injection for her right knee osteoarthritis  She notes that today she still feeling relief from that injection  She notes that if she sitting for long periods of time and tries to get up she will experience a sharp pain over the medial aspect of her right knee  She states the pain is an 8/10 but will go away when she starts walking  She notes that she can perform her activities of daily living without any issues  She notes that's since she recovered from [place], her legs feels weak  She does use Advil when her pain gets bad  Review of Systems   Constitutional: Positive for activity change  HENT: Negative  Eyes: Negative  Respiratory: Negative  Cardiovascular: Negative  Gastrointestinal: Negative  Endocrine: Negative  Musculoskeletal: Negative      Skin: Negative  Allergic/Immunologic: Negative  Neurological: Negative  Hematological: Negative  Psychiatric/Behavioral: Negative  Past Medical History:   Diagnosis Date    Cardiac disease     Diabetes mellitus (HCC)     GERD (gastroesophageal reflux disease)        Past Surgical History:   Procedure Laterality Date    CARDIAC PACEMAKER PLACEMENT       SECTION      x 2    JOINT REPLACEMENT         Family History   Problem Relation Age of Onset    Parkinsonism Mother     No Known Problems Father     No Known Problems Brother     No Known Problems Maternal Aunt     No Known Problems Maternal Uncle     No Known Problems Paternal Aunt     No Known Problems Paternal Uncle     No Known Problems Maternal Grandmother     No Known Problems Maternal Grandfather     Breast cancer Paternal Grandmother 61    No Known Problems Paternal Grandfather     No Known Problems Daughter     No Known Problems Maternal Aunt     No Known Problems Paternal Aunt     ADD / ADHD Neg Hx     Anesthesia problems Neg Hx     Cancer Neg Hx     Clotting disorder Neg Hx     Collagen disease Neg Hx     Diabetes Neg Hx     Dislocations Neg Hx     Learning disabilities Neg Hx     Neurological problems Neg Hx     Osteoporosis Neg Hx     Rheumatologic disease Neg Hx     Scoliosis Neg Hx     Vascular Disease Neg Hx        Social History     Occupational History    Not on file   Tobacco Use    Smoking status: Never Smoker    Smokeless tobacco: Never Used   Substance and Sexual Activity    Alcohol use: Yes     Comment: socially    Drug use: No    Sexual activity: Not on file         Current Outpatient Medications:     aspirin 81 mg chewable tablet, Chew 81 mg daily  , Disp: , Rfl:     cyclobenzaprine (FLEXERIL) 10 mg tablet, , Disp: , Rfl:     cyclobenzaprine (FLEXERIL) 10 mg tablet, Take 1 tablet (10 mg total) by mouth 2 (two) times a day as needed for muscle spasms, Disp: 20 tablet, Rfl: 0   diclofenac sodium (VOLTAREN) 1 %, Apply 2 g topically 4 (four) times a day, Disp: 1 Tube, Rfl: 1    DULoxetine (CYMBALTA) 60 mg delayed release capsule, , Disp: , Rfl:     gabapentin (NEURONTIN) 100 mg capsule, , Disp: , Rfl:     JANUMET  MG per tablet, , Disp: , Rfl:     losartan (COZAAR) 50 mg tablet, , Disp: , Rfl:     metoprolol succinate (TOPROL-XL) 50 mg 24 hr tablet, Take 50 mg by mouth daily  50 mg in am and 25 mg in the evening, Disp: , Rfl:     naproxen (NAPROSYN) 500 mg tablet, Take 1 tablet (500 mg total) by mouth 2 (two) times a day with meals, Disp: 20 tablet, Rfl: 0    pantoprazole (PROTONIX) 40 mg tablet, , Disp: , Rfl:     simvastatin (ZOCOR) 20 mg tablet, Take 20 mg by mouth daily at bedtime  , Disp: , Rfl:     Allergies   Allergen Reactions    Codeine GI Intolerance    Morphine     Penicillins Other (See Comments)     unknown       Objective:  Vitals:    01/22/21 1121   BP: 120/70   Pulse: 60       Body mass index is 37 11 kg/m²  Right Knee Exam     Tenderness   The patient is experiencing tenderness in the MCL  Range of Motion   Extension: 0   Flexion: 130     Tests   Varus: negative Valgus: negative  Drawer:  Anterior - negative      Patellar apprehension: negative    Other   Erythema: absent  Scars: absent  Sensation: normal  Pulse: present  Swelling: none  Effusion: no effusion present    Comments:  Stable at 0, 30 and 90°  Neurovascularly intact distally  Parapatellar crepitance noted with active and passive range of motion      Left Knee Exam     Tests   Patellar apprehension: negative          Observations     Right Knee   Negative for effusion  Physical Exam  Vitals signs and nursing note reviewed  Constitutional:       Appearance: She is well-developed  HENT:      Head: Normocephalic and atraumatic  Eyes:      General: No scleral icterus  Extraocular Movements: Extraocular movements intact        Conjunctiva/sclera: Conjunctivae normal    Neck: Musculoskeletal: Normal range of motion and neck supple  Cardiovascular:      Rate and Rhythm: Normal rate  Pulmonary:      Effort: Pulmonary effort is normal  No respiratory distress  Musculoskeletal:      Right knee: She exhibits no effusion  Comments: As noted in HPI   Skin:     General: Skin is warm and dry  Neurological:      Mental Status: She is alert and oriented to person, place, and time     Psychiatric:         Behavior: Behavior normal

## 2021-04-14 ENCOUNTER — OFFICE VISIT (OUTPATIENT)
Dept: OBGYN CLINIC | Facility: CLINIC | Age: 66
End: 2021-04-14
Payer: COMMERCIAL

## 2021-04-14 ENCOUNTER — APPOINTMENT (OUTPATIENT)
Dept: RADIOLOGY | Facility: CLINIC | Age: 66
End: 2021-04-14
Payer: COMMERCIAL

## 2021-04-14 VITALS
HEART RATE: 60 BPM | DIASTOLIC BLOOD PRESSURE: 67 MMHG | WEIGHT: 227.8 LBS | BODY MASS INDEX: 37.95 KG/M2 | SYSTOLIC BLOOD PRESSURE: 105 MMHG | HEIGHT: 65 IN

## 2021-04-14 DIAGNOSIS — M25.512 LEFT SHOULDER PAIN, UNSPECIFIED CHRONICITY: ICD-10-CM

## 2021-04-14 DIAGNOSIS — M75.82 ROTATOR CUFF TENDONITIS, LEFT: ICD-10-CM

## 2021-04-14 DIAGNOSIS — M25.512 LEFT SHOULDER PAIN, UNSPECIFIED CHRONICITY: Primary | ICD-10-CM

## 2021-04-14 PROCEDURE — 73030 X-RAY EXAM OF SHOULDER: CPT

## 2021-04-14 PROCEDURE — 99214 OFFICE O/P EST MOD 30 MIN: CPT | Performed by: ORTHOPAEDIC SURGERY

## 2021-04-14 RX ORDER — GABAPENTIN 300 MG/1
CAPSULE ORAL
COMMUNITY
Start: 2021-03-30

## 2021-04-14 RX ORDER — METOPROLOL SUCCINATE 100 MG/1
TABLET, EXTENDED RELEASE ORAL
COMMUNITY
Start: 2021-04-06

## 2021-04-14 NOTE — PATIENT INSTRUCTIONS
Exercises for Shoulder Abduction and Adduction   AMBULATORY CARE:   Shoulder abduction and adduction exercises  work the muscles at the back of your shoulder and your upper back  Before you exercise:  Warm up and stretch before you exercise  Walk or ride a stationary bike for 5 to 10 minutes to help you warm up  Stretching helps increase range of motion  It may also decrease muscle soreness and help prevent another injury  Your healthcare provider will tell you which of the following stretches to do:  · Crossover arm stretch:  Relax your shoulders  Hold your upper arm with the opposite hand  Pull your arm across your chest until you feel a stretch  Hold the stretch for 30 seconds  Return to the starting position  · Shoulder flexion stretch:  Stand facing a wall  Slowly walk your fingers up the wall until you feel a stretch  Hold the stretch for 30 seconds  Return to the starting position  · Sleeper stretch:  Lie on your injured side on a firm, flat surface  Bend the elbow of your injured arm 90° with your hand facing up  Use your arm that is not injured to slowly push your injured arm down  Stop when you feel a stretch at the back of your injured shoulder  Hold the stretch for 30 seconds  Slowly return to the starting position  Contact your healthcare provider if:   · You have sharp or worsening pain during exercise or at rest     · You have questions or concerns about your shoulder exercises  How to exercise with a weight:  Your healthcare provider will tell you how much weight to use  · Shoulder abduction:  Stand and hold a weight in your hand with your palm facing your body  Slowly raise your arm to the side with your thumb pointing up  Then raise your arm over your head as far as you can without pain  Hold this position for as long as directed  Do not raise your arm over your head unless your healthcare provider says it is okay            · Shoulder adduction:  Lie on your back on a firm surface  Extend your arm out to a "T " Bend your elbow so your forearm in the air  Hold a weight in your hand  Slowly raise your arm toward the ceiling and straighten your elbow  Hold this position for as long as directed  Slowly return to the starting position  How to exercise with an exercise band:   · Shoulder abduction:  Wrap the exercise band around a heavy, stable object near your foot  Grab the band with the hand of your injured shoulder  Keep your arm straight  Slowly raise your arm to the side with your thumb pointing up  Then, slowly pull the band over your head as far as you can without pain  Do not raise your arm over your head unless your healthcare provider says it is okay  Do not let your shoulder shrug  Hold this position for as long as directed  Slowly return to the starting position  · Shoulder adduction:  Wrap the exercise band around a heavy, stable object  Stand and face away from where the band is anchored  Hold each end of the band in both hands with your elbows bent  Your elbows should not be behind your body  Keep your arms parallel to the floor and slowly straighten your elbows  Hold this position for as long as directed  Slowly return to the starting position  Follow up with your physical therapist as directed:  Write down your questions so you remember to ask them at your visits  © Copyright [address] Information is for End User's use only and may not be sold, redistributed or otherwise used for commercial purposes  All illustrations and images included in CareNotes® are the copyrighted property of A D A M , Inc  or [facility] Linda Springer   The above information is an  only  It is not intended as medical advice for individual conditions or treatments  Talk to your doctor, nurse or pharmacist before following any medical regimen to see if it is safe and effective for you    Exercises for Internal and External Shoulder Rotation   AMBULATORY CARE:   Muscles worked during internal and external shoulder exercises:  Exercises for internal shoulder rotation work the muscles in your chest and front of your shoulder  Exercises for external shoulder rotation work the muscles in the back of your shoulder and upper back  Contact your healthcare provider if:   · You have sharp or worsening pain during exercise or at rest     · You have questions or concerns about your shoulder exercises  Before you exercise:  Warm up and stretch before you exercise  Walk or ride a stationary bike for 5 to 10 minutes to help you warm up  Stretching helps increase range of motion  It may also decrease muscle soreness and help prevent another injury  Your healthcare provider will tell you which of the following stretches to do:  · Crossover arm stretch:  Relax your shoulders  Hold your upper arm with the opposite hand  Pull your arm across your chest until you feel a stretch  Hold the stretch for 30 seconds  Return to the starting position  · Shoulder flexion stretch:  Stand facing a wall  Slowly walk your fingers up the wall until you feel a stretch  Hold the stretch for 30 seconds  Return to the starting position  · Sleeper stretch:  Lie on your injured side on a firm, flat surface  Bend the elbow of your injured arm 90° with your hand facing up  Use your arm that is not injured to slowly push your injured arm down  Stop when you feel a stretch at the back of your injured shoulder  Hold the stretch for 30 seconds  Slowly return to the starting position  How to exercise with a weight:  Your healthcare provider will tell you how much weight to exercise with  · Shoulder internal rotation:  Sit in a chair  Place a rolled up towel between your elbow and your side  Bend your elbow to 90°  Gently squeeze the towel with your elbow to prevent it from falling out  Hold the weight with your thumb pointing up   Slowly move the weight across your chest  Stop when your hand reaches your opposite arm  Hold this position for as many seconds as directed  Slowly return to the starting position  · Shoulder external rotation:  Lie on your side with your injured shoulder facing up  Bend your elbow 90°  Place a rolled up towel between your elbow and your side  Hold a weight in your hand  Gently squeeze the towel with your elbow to prevent it from falling out  Slowly rotate your arm outward, but keep your elbow bent  Stop when you feel a stretch  Hold this position for 30 seconds or as directed  Slowly return to the starting position  How to exercise with an exercise band:   · Shoulder internal rotation:  Tie one end of the exercise band to a heavy, secure object  Sit in a chair  Place a rolled up towel between your elbow and your side  Bend your elbow to 90°  Gently squeeze the towel with your elbow to prevent it from falling out  Slowly pull the band across your chest  Stop when your hand reaches your opposite arm  Hold this position for as many seconds as directed  Slowly return to the starting position  · Shoulder external rotation:  Hold one end of the exercise band on the side that is not injured  Place a rolled up towel between your elbow and your side  Bend your elbow 90°  Squeeze the towel with your elbow  Grab the end of the band and slowly turn your arm outward, but keep your elbow bent  Stop when you feel a stretch  Hold this position for 30 seconds or as directed  Slowly return to the starting position  Follow up with your physical therapist as directed:  Write down your questions so you remember to ask them at your visits  © Copyright [address] Information is for End User's use only and may not be sold, redistributed or otherwise used for commercial purposes  All illustrations and images included in CareNotes® are the copyrighted property of [employer]  or [facility] Linda Springer   The above information is an  only   It is not intended as medical advice for individual conditions or treatments  Talk to your doctor, nurse or pharmacist before following any medical regimen to see if it is safe and effective for you

## 2021-04-14 NOTE — PROGRESS NOTES
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  We did provide her with home exercises and a theraband today  She should ice and take OTC medication as needed  She will follow-up in 6 weeks for repeat evaluation  If she fails to make progress we discussed possible MRI of the shoulder to rule out RTC tear  Subjective:   Patel Hernandez is a 61 y o  female who presents today with pain about the left shoulder  She notes that this began about a month ago when she was reaching in to a low drawer and felt a sharp pain about the shoulder  Since that time she has had diffuse pain about the shoulder  This is worse with activity, especially overhead movements  Rest helps  She notes limitations in ROM and weakness of the shoulder  She notes good sensation of the left upper extremity  Review of Systems   Constitutional: Negative  Negative for chills and fever  HENT: Negative  Negative for ear pain and sore throat  Eyes: Negative  Negative for pain and redness  Respiratory: Negative  Negative for shortness of breath and wheezing  Cardiovascular: Negative for chest pain and palpitations  Gastrointestinal: Negative  Negative for abdominal pain and blood in stool  Endocrine: Negative  Negative for polydipsia and polyuria  Genitourinary: Negative  Negative for difficulty urinating and dysuria  Musculoskeletal:        As noted in HPI   Skin: Negative  Negative for pallor and rash  Neurological: Negative  Negative for dizziness and numbness  Hematological: Negative  Negative for adenopathy  Does not bruise/bleed easily  Psychiatric/Behavioral: Negative  Negative for confusion and suicidal ideas  Past Medical History:   Diagnosis Date    Cardiac disease     Diabetes mellitus (HCC)     GERD (gastroesophageal reflux disease)        Past Surgical History:   Procedure Laterality Date    CARDIAC PACEMAKER PLACEMENT       SECTION      x 2    JOINT REPLACEMENT         Family History   Problem Relation Age of Onset    Parkinsonism Mother     No Known Problems Father     No Known Problems Brother     No Known Problems Maternal Aunt     No Known Problems Maternal Uncle     No Known Problems Paternal Aunt     No Known Problems Paternal Uncle     No Known Problems Maternal Grandmother     No Known Problems Maternal Grandfather     Breast cancer Paternal Grandmother 61    No Known Problems Paternal Grandfather     No Known Problems Daughter     No Known Problems Maternal Aunt     No Known Problems Paternal Aunt     ADD / ADHD Neg Hx     Anesthesia problems Neg Hx     Cancer Neg Hx     Clotting disorder Neg Hx     Collagen disease Neg Hx     Diabetes Neg Hx     Dislocations Neg Hx     Learning disabilities Neg Hx     Neurological problems Neg Hx     Osteoporosis Neg Hx     Rheumatologic disease Neg Hx     Scoliosis Neg Hx     Vascular Disease Neg Hx        Social History     Occupational History    Not on file   Tobacco Use    Smoking status: Never Smoker    Smokeless tobacco: Never Used   Substance and Sexual Activity    Alcohol use: Yes     Comment: socially    Drug use: No    Sexual activity: Not on file         Current Outpatient Medications:     aspirin 81 mg chewable tablet, Chew 81 mg daily  , Disp: , Rfl:     cyclobenzaprine (FLEXERIL) 10 mg tablet, Take 1 tablet (10 mg total) by mouth 2 (two) times a day as needed for muscle spasms, Disp: 20 tablet, Rfl: 0    DULoxetine (CYMBALTA) 60 mg delayed release capsule, , Disp: , Rfl:     gabapentin (NEURONTIN) 300 mg capsule, , Disp: , Rfl:   JANUMET  MG per tablet, , Disp: , Rfl:     metoprolol succinate (TOPROL-XL) 100 mg 24 hr tablet, , Disp: , Rfl:     metoprolol succinate (TOPROL-XL) 50 mg 24 hr tablet, Take 50 mg by mouth daily  50 mg in am and 25 mg in the evening, Disp: , Rfl:     pantoprazole (PROTONIX) 40 mg tablet, , Disp: , Rfl:     simvastatin (ZOCOR) 20 mg tablet, Take 20 mg by mouth daily at bedtime  , Disp: , Rfl:     Allergies   Allergen Reactions    Codeine GI Intolerance    Morphine     Penicillins Other (See Comments)     unknown       Objective:  Vitals:    04/14/21 1021   BP: 105/67   Pulse: 60       Left Shoulder Exam     Tenderness   The patient is experiencing no tenderness  Range of Motion   Active abduction: 130   External rotation: 50   Forward flexion: 140   Internal rotation 0 degrees: L5     Muscle Strength   Abduction: 4/5   Internal rotation: 4/5   External rotation: 5/5     Tests   Drop arm: negative    Other   Erythema: absent  Sensation: normal  Pulse: present     Comments:  Positive empty can test              Physical Exam  Constitutional:       General: She is not in acute distress  Appearance: She is well-developed  HENT:      Head: Normocephalic and atraumatic  Eyes:      General: No scleral icterus  Conjunctiva/sclera: Conjunctivae normal    Neck:      Vascular: No JVD  Cardiovascular:      Rate and Rhythm: Normal rate  Pulmonary:      Effort: Pulmonary effort is normal  No respiratory distress  Skin:     General: Skin is warm  Neurological:      Mental Status: She is alert and oriented to person, place, and time  Coordination: Coordination normal          I have personally reviewed pertinent films in PACS and my interpretation is as follows:  Xrays left shoulder  Calcium deposit noted posterior shoulder  Otherwise no acute osseous abnormality

## 2021-04-22 ENCOUNTER — TRANSCRIBE ORDERS (OUTPATIENT)
Dept: ADMINISTRATIVE | Facility: HOSPITAL | Age: 66
End: 2021-04-22

## 2021-04-22 ENCOUNTER — HOSPITAL ENCOUNTER (OUTPATIENT)
Dept: RADIOLOGY | Facility: HOSPITAL | Age: 66
Discharge: HOME | End: 2021-04-22
Attending: INTERNAL MEDICINE
Payer: COMMERCIAL

## 2021-04-22 DIAGNOSIS — M19.049 HAND ARTHRITIS: ICD-10-CM

## 2021-04-22 DIAGNOSIS — M25.559 HIP PAIN: Primary | ICD-10-CM

## 2021-04-22 DIAGNOSIS — M25.559 HIP PAIN: ICD-10-CM

## 2021-04-22 PROCEDURE — 73523 X-RAY EXAM HIPS BI 5/> VIEWS: CPT

## 2021-04-22 PROCEDURE — 73130 X-RAY EXAM OF HAND: CPT

## 2022-08-23 ENCOUNTER — HOSPITAL ENCOUNTER (OUTPATIENT)
Dept: RADIOLOGY | Facility: HOSPITAL | Age: 67
Discharge: HOME | End: 2022-08-23
Payer: COMMERCIAL

## 2022-08-23 VITALS — BODY MASS INDEX: 42.21 KG/M2 | HEIGHT: 60 IN | WEIGHT: 215 LBS

## 2022-08-23 DIAGNOSIS — Z12.31 SCREENING MAMMOGRAM, ENCOUNTER FOR: ICD-10-CM

## 2022-08-23 PROCEDURE — 77067 SCR MAMMO BI INCL CAD: CPT

## 2022-08-23 PROCEDURE — 77063 BREAST TOMOSYNTHESIS BI: CPT

## 2023-08-10 ENCOUNTER — HOSPITAL ENCOUNTER (OUTPATIENT)
Dept: CT IMAGING | Facility: HOSPITAL | Age: 68
Discharge: HOME | End: 2023-08-10
Payer: COMMERCIAL

## 2023-08-10 DIAGNOSIS — E83.50 UNSPECIFIED DISORDER OF CALCIUM METABOLISM: ICD-10-CM

## 2023-08-10 PROCEDURE — G1004 CDSM NDSC: HCPCS

## 2023-08-10 PROCEDURE — 75571 CT HRT W/O DYE W/CA TEST: CPT

## 2023-09-28 ENCOUNTER — HOSPITAL ENCOUNTER (OUTPATIENT)
Dept: RADIOLOGY | Facility: HOSPITAL | Age: 68
Discharge: HOME | End: 2023-09-28
Payer: COMMERCIAL

## 2023-09-28 VITALS — BODY MASS INDEX: 39.66 KG/M2 | WEIGHT: 202 LBS | HEIGHT: 60 IN

## 2023-09-28 DIAGNOSIS — Z12.31 ENCOUNTER FOR SCREENING MAMMOGRAM FOR MALIGNANT NEOPLASM OF BREAST: ICD-10-CM

## 2023-09-28 PROCEDURE — 77063 BREAST TOMOSYNTHESIS BI: CPT

## 2023-09-28 PROCEDURE — 77067 SCR MAMMO BI INCL CAD: CPT

## 2023-10-11 ENCOUNTER — TELEPHONE (OUTPATIENT)
Dept: SLEEP CENTER | Facility: CLINIC | Age: 68
End: 2023-10-11

## 2023-10-11 NOTE — TELEPHONE ENCOUNTER
----- Message from Cely Sotomayor MD sent at 10/10/2023  9:10 PM EDT -----  Approved    ----- Message -----  From: Veronica Armendariz  Sent: 10/9/2023   8:07 AM EDT  To: Sleep Medicine [facility] Provider    PAPER H&P IN MEDIA    This Home sleep study needs approval.     If approved please sign and return to clerical pool. If denied please include reasons why. Also provide alternative testing if warranted. Please sign and return to clerical pool.

## 2024-06-07 DIAGNOSIS — E66.09 CLASS 2 OBESITY DUE TO EXCESS CALORIES WITH BODY MASS INDEX (BMI) OF 39.0 TO 39.9 IN ADULT, UNSPECIFIED WHETHER SERIOUS COMORBIDITY PRESENT: ICD-10-CM

## 2024-06-07 DIAGNOSIS — R06.83 SNORING: ICD-10-CM

## 2024-06-07 DIAGNOSIS — G47.33 OSA (OBSTRUCTIVE SLEEP APNEA): ICD-10-CM

## 2024-06-07 DIAGNOSIS — G47.10 DAYTIME HYPERSOMNIA: Primary | ICD-10-CM

## 2024-09-05 ENCOUNTER — APPOINTMENT (EMERGENCY)
Dept: RADIOLOGY | Facility: HOSPITAL | Age: 69
DRG: 871 | End: 2024-09-05
Payer: COMMERCIAL

## 2024-09-05 ENCOUNTER — APPOINTMENT (OUTPATIENT)
Dept: RADIOLOGY | Facility: HOSPITAL | Age: 69
DRG: 871 | End: 2024-09-05
Payer: COMMERCIAL

## 2024-09-05 ENCOUNTER — HOSPITAL ENCOUNTER (INPATIENT)
Facility: HOSPITAL | Age: 69
LOS: 3 days | Discharge: HOME | DRG: 871 | End: 2024-09-09
Attending: STUDENT IN AN ORGANIZED HEALTH CARE EDUCATION/TRAINING PROGRAM
Payer: COMMERCIAL

## 2024-09-05 DIAGNOSIS — J18.9 PNEUMONIA: Primary | ICD-10-CM

## 2024-09-05 DIAGNOSIS — I10 PRIMARY HYPERTENSION: ICD-10-CM

## 2024-09-05 PROBLEM — A41.9 SEPSIS (HCC): Status: ACTIVE | Noted: 2024-09-05

## 2024-09-05 PROBLEM — J45.901 ASTHMA EXACERBATION: Status: ACTIVE | Noted: 2024-09-05

## 2024-09-05 LAB
2HR DELTA HS TROPONIN: 3 NG/L
ALBUMIN SERPL BCG-MCNC: 4.1 G/DL (ref 3.5–5)
ALP SERPL-CCNC: 56 U/L (ref 34–104)
ALT SERPL W P-5'-P-CCNC: 15 U/L (ref 7–52)
ANION GAP SERPL CALCULATED.3IONS-SCNC: 13 MMOL/L (ref 4–13)
AST SERPL W P-5'-P-CCNC: 14 U/L (ref 13–39)
ATRIAL RATE: 94 BPM
BASOPHILS # BLD AUTO: 0.07 THOUSANDS/ÂΜL (ref 0–0.1)
BASOPHILS NFR BLD AUTO: 0 % (ref 0–1)
BILIRUB SERPL-MCNC: 1.16 MG/DL (ref 0.2–1)
BNP SERPL-MCNC: 330 PG/ML (ref 0–100)
BUN SERPL-MCNC: 22 MG/DL (ref 5–25)
CALCIUM SERPL-MCNC: 9.2 MG/DL (ref 8.4–10.2)
CARDIAC TROPONIN I PNL SERPL HS: 12 NG/L
CARDIAC TROPONIN I PNL SERPL HS: 9 NG/L
CHLORIDE SERPL-SCNC: 96 MMOL/L (ref 96–108)
CO2 SERPL-SCNC: 23 MMOL/L (ref 21–32)
CREAT SERPL-MCNC: 1.25 MG/DL (ref 0.6–1.3)
EOSINOPHIL # BLD AUTO: 0.28 THOUSAND/ÂΜL (ref 0–0.61)
EOSINOPHIL NFR BLD AUTO: 2 % (ref 0–6)
ERYTHROCYTE [DISTWIDTH] IN BLOOD BY AUTOMATED COUNT: 13.2 % (ref 11.6–15.1)
EST. AVERAGE GLUCOSE BLD GHB EST-MCNC: 154 MG/DL
FLUAV RNA RESP QL NAA+PROBE: NEGATIVE
FLUBV RNA RESP QL NAA+PROBE: NEGATIVE
GFR SERPL CREATININE-BSD FRML MDRD: 44 ML/MIN/1.73SQ M
GLUCOSE SERPL-MCNC: 197 MG/DL (ref 65–140)
GLUCOSE SERPL-MCNC: 210 MG/DL (ref 65–140)
HBA1C MFR BLD: 7 %
HCT VFR BLD AUTO: 47.8 % (ref 34.8–46.1)
HGB BLD-MCNC: 15.9 G/DL (ref 11.5–15.4)
IMM GRANULOCYTES # BLD AUTO: 0.12 THOUSAND/UL (ref 0–0.2)
IMM GRANULOCYTES NFR BLD AUTO: 1 % (ref 0–2)
LYMPHOCYTES # BLD AUTO: 1.24 THOUSANDS/ÂΜL (ref 0.6–4.47)
LYMPHOCYTES NFR BLD AUTO: 7 % (ref 14–44)
MAGNESIUM SERPL-MCNC: 1.3 MG/DL (ref 1.9–2.7)
MCH RBC QN AUTO: 30.6 PG (ref 26.8–34.3)
MCHC RBC AUTO-ENTMCNC: 33.3 G/DL (ref 31.4–37.4)
MCV RBC AUTO: 92 FL (ref 82–98)
MONOCYTES # BLD AUTO: 0.95 THOUSAND/ÂΜL (ref 0.17–1.22)
MONOCYTES NFR BLD AUTO: 5 % (ref 4–12)
NEUTROPHILS # BLD AUTO: 15.1 THOUSANDS/ÂΜL (ref 1.85–7.62)
NEUTS SEG NFR BLD AUTO: 85 % (ref 43–75)
NRBC BLD AUTO-RTO: 0 /100 WBCS
P AXIS: 73 DEGREES
PLATELET # BLD AUTO: 424 THOUSANDS/UL (ref 149–390)
PMV BLD AUTO: 9.1 FL (ref 8.9–12.7)
POTASSIUM SERPL-SCNC: 4.1 MMOL/L (ref 3.5–5.3)
PR INTERVAL: 152 MS
PROCALCITONIN SERPL-MCNC: 0.39 NG/ML
PROT SERPL-MCNC: 6.9 G/DL (ref 6.4–8.4)
QRS AXIS: -83 DEGREES
QRSD INTERVAL: 188 MS
QT INTERVAL: 436 MS
QTC INTERVAL: 545 MS
RBC # BLD AUTO: 5.19 MILLION/UL (ref 3.81–5.12)
RSV RNA RESP QL NAA+PROBE: NEGATIVE
SARS-COV-2 RNA RESP QL NAA+PROBE: NEGATIVE
SODIUM SERPL-SCNC: 132 MMOL/L (ref 135–147)
T WAVE AXIS: 87 DEGREES
VENTRICULAR RATE: 94 BPM
WBC # BLD AUTO: 17.76 THOUSAND/UL (ref 4.31–10.16)

## 2024-09-05 PROCEDURE — 80053 COMPREHEN METABOLIC PANEL: CPT | Performed by: STUDENT IN AN ORGANIZED HEALTH CARE EDUCATION/TRAINING PROGRAM

## 2024-09-05 PROCEDURE — 93005 ELECTROCARDIOGRAM TRACING: CPT

## 2024-09-05 PROCEDURE — 93010 ELECTROCARDIOGRAM REPORT: CPT | Performed by: INTERNAL MEDICINE

## 2024-09-05 PROCEDURE — 94640 AIRWAY INHALATION TREATMENT: CPT

## 2024-09-05 PROCEDURE — 85025 COMPLETE CBC W/AUTO DIFF WBC: CPT | Performed by: STUDENT IN AN ORGANIZED HEALTH CARE EDUCATION/TRAINING PROGRAM

## 2024-09-05 PROCEDURE — 87449 NOS EACH ORGANISM AG IA: CPT

## 2024-09-05 PROCEDURE — 71260 CT THORAX DX C+: CPT

## 2024-09-05 PROCEDURE — 84145 PROCALCITONIN (PCT): CPT | Performed by: STUDENT IN AN ORGANIZED HEALTH CARE EDUCATION/TRAINING PROGRAM

## 2024-09-05 PROCEDURE — 99285 EMERGENCY DEPT VISIT HI MDM: CPT | Performed by: STUDENT IN AN ORGANIZED HEALTH CARE EDUCATION/TRAINING PROGRAM

## 2024-09-05 PROCEDURE — 96367 TX/PROPH/DG ADDL SEQ IV INF: CPT

## 2024-09-05 PROCEDURE — 0241U HB NFCT DS VIR RESP RNA 4 TRGT: CPT | Performed by: STUDENT IN AN ORGANIZED HEALTH CARE EDUCATION/TRAINING PROGRAM

## 2024-09-05 PROCEDURE — 99222 1ST HOSP IP/OBS MODERATE 55: CPT

## 2024-09-05 PROCEDURE — 84484 ASSAY OF TROPONIN QUANT: CPT | Performed by: STUDENT IN AN ORGANIZED HEALTH CARE EDUCATION/TRAINING PROGRAM

## 2024-09-05 PROCEDURE — 82948 REAGENT STRIP/BLOOD GLUCOSE: CPT

## 2024-09-05 PROCEDURE — 87040 BLOOD CULTURE FOR BACTERIA: CPT | Performed by: STUDENT IN AN ORGANIZED HEALTH CARE EDUCATION/TRAINING PROGRAM

## 2024-09-05 PROCEDURE — 83036 HEMOGLOBIN GLYCOSYLATED A1C: CPT

## 2024-09-05 PROCEDURE — 83735 ASSAY OF MAGNESIUM: CPT | Performed by: STUDENT IN AN ORGANIZED HEALTH CARE EDUCATION/TRAINING PROGRAM

## 2024-09-05 PROCEDURE — 94760 N-INVAS EAR/PLS OXIMETRY 1: CPT

## 2024-09-05 PROCEDURE — 36415 COLL VENOUS BLD VENIPUNCTURE: CPT | Performed by: STUDENT IN AN ORGANIZED HEALTH CARE EDUCATION/TRAINING PROGRAM

## 2024-09-05 PROCEDURE — 96366 THER/PROPH/DIAG IV INF ADDON: CPT

## 2024-09-05 PROCEDURE — 71045 X-RAY EXAM CHEST 1 VIEW: CPT

## 2024-09-05 PROCEDURE — 96365 THER/PROPH/DIAG IV INF INIT: CPT

## 2024-09-05 PROCEDURE — 99285 EMERGENCY DEPT VISIT HI MDM: CPT

## 2024-09-05 PROCEDURE — 83880 ASSAY OF NATRIURETIC PEPTIDE: CPT | Performed by: STUDENT IN AN ORGANIZED HEALTH CARE EDUCATION/TRAINING PROGRAM

## 2024-09-05 RX ORDER — CEFTRIAXONE 1 G/50ML
1000 INJECTION, SOLUTION INTRAVENOUS ONCE
Status: COMPLETED | OUTPATIENT
Start: 2024-09-05 | End: 2024-09-05

## 2024-09-05 RX ORDER — DOXYCYCLINE 100 MG/1
100 CAPSULE ORAL EVERY 12 HOURS SCHEDULED
Status: DISCONTINUED | OUTPATIENT
Start: 2024-09-05 | End: 2024-09-09 | Stop reason: HOSPADM

## 2024-09-05 RX ORDER — ASPIRIN 81 MG/1
81 TABLET, CHEWABLE ORAL DAILY
Status: DISCONTINUED | OUTPATIENT
Start: 2024-09-06 | End: 2024-09-09 | Stop reason: HOSPADM

## 2024-09-05 RX ORDER — DOXYCYCLINE 100 MG/1
100 CAPSULE ORAL EVERY 12 HOURS SCHEDULED
Status: DISCONTINUED | OUTPATIENT
Start: 2024-09-05 | End: 2024-09-05

## 2024-09-05 RX ORDER — CEFTRIAXONE 1 G/50ML
1000 INJECTION, SOLUTION INTRAVENOUS EVERY 24 HOURS
Status: DISCONTINUED | OUTPATIENT
Start: 2024-09-06 | End: 2024-09-09 | Stop reason: HOSPADM

## 2024-09-05 RX ORDER — ACETAMINOPHEN 10 MG/ML
1000 INJECTION, SOLUTION INTRAVENOUS ONCE
Status: COMPLETED | OUTPATIENT
Start: 2024-09-05 | End: 2024-09-05

## 2024-09-05 RX ORDER — MAGNESIUM SULFATE HEPTAHYDRATE 40 MG/ML
2 INJECTION, SOLUTION INTRAVENOUS ONCE
Status: COMPLETED | OUTPATIENT
Start: 2024-09-05 | End: 2024-09-05

## 2024-09-05 RX ORDER — IPRATROPIUM BROMIDE AND ALBUTEROL SULFATE 2.5; .5 MG/3ML; MG/3ML
3 SOLUTION RESPIRATORY (INHALATION)
Status: DISCONTINUED | OUTPATIENT
Start: 2024-09-05 | End: 2024-09-09 | Stop reason: HOSPADM

## 2024-09-05 RX ORDER — INSULIN LISPRO 100 [IU]/ML
1-6 INJECTION, SOLUTION INTRAVENOUS; SUBCUTANEOUS
Status: DISCONTINUED | OUTPATIENT
Start: 2024-09-06 | End: 2024-09-09 | Stop reason: HOSPADM

## 2024-09-05 RX ORDER — ALBUTEROL SULFATE 5 MG/ML
5 SOLUTION RESPIRATORY (INHALATION) ONCE
Status: COMPLETED | OUTPATIENT
Start: 2024-09-05 | End: 2024-09-05

## 2024-09-05 RX ORDER — DULOXETIN HYDROCHLORIDE 30 MG/1
30 CAPSULE, DELAYED RELEASE ORAL DAILY
Status: DISCONTINUED | OUTPATIENT
Start: 2024-09-06 | End: 2024-09-09 | Stop reason: HOSPADM

## 2024-09-05 RX ORDER — HEPARIN SODIUM 5000 [USP'U]/ML
5000 INJECTION, SOLUTION INTRAVENOUS; SUBCUTANEOUS EVERY 8 HOURS SCHEDULED
Status: DISCONTINUED | OUTPATIENT
Start: 2024-09-05 | End: 2024-09-09 | Stop reason: HOSPADM

## 2024-09-05 RX ORDER — GABAPENTIN 300 MG/1
300 CAPSULE ORAL 2 TIMES DAILY
Status: DISCONTINUED | OUTPATIENT
Start: 2024-09-05 | End: 2024-09-09 | Stop reason: HOSPADM

## 2024-09-05 RX ORDER — PANTOPRAZOLE SODIUM 40 MG/1
40 TABLET, DELAYED RELEASE ORAL
Status: DISCONTINUED | OUTPATIENT
Start: 2024-09-06 | End: 2024-09-09 | Stop reason: HOSPADM

## 2024-09-05 RX ORDER — PRAVASTATIN SODIUM 40 MG
40 TABLET ORAL
Status: DISCONTINUED | OUTPATIENT
Start: 2024-09-06 | End: 2024-09-09 | Stop reason: HOSPADM

## 2024-09-05 RX ORDER — METOPROLOL SUCCINATE 50 MG/1
50 TABLET, EXTENDED RELEASE ORAL 2 TIMES DAILY
Status: DISCONTINUED | OUTPATIENT
Start: 2024-09-05 | End: 2024-09-06

## 2024-09-05 RX ORDER — SODIUM CHLORIDE 9 MG/ML
3 INJECTION INTRAVENOUS
Status: DISCONTINUED | OUTPATIENT
Start: 2024-09-05 | End: 2024-09-09 | Stop reason: HOSPADM

## 2024-09-05 RX ORDER — LOSARTAN POTASSIUM 25 MG/1
25 TABLET ORAL DAILY
COMMUNITY

## 2024-09-05 RX ADMIN — ACETAMINOPHEN 1000 MG: 10 INJECTION INTRAVENOUS at 15:46

## 2024-09-05 RX ADMIN — GABAPENTIN 300 MG: 300 CAPSULE ORAL at 22:00

## 2024-09-05 RX ADMIN — FLUTICASONE FUROATE 1 PUFF: 200 POWDER RESPIRATORY (INHALATION) at 21:57

## 2024-09-05 RX ADMIN — DOXYCYCLINE 100 MG: 100 CAPSULE ORAL at 19:11

## 2024-09-05 RX ADMIN — ALBUTEROL SULFATE 5 MG: 2.5 SOLUTION RESPIRATORY (INHALATION) at 15:48

## 2024-09-05 RX ADMIN — IPRATROPIUM BROMIDE AND ALBUTEROL SULFATE 3 ML: .5; 3 SOLUTION RESPIRATORY (INHALATION) at 22:35

## 2024-09-05 RX ADMIN — CEFTRIAXONE 1000 MG: 1 INJECTION, SOLUTION INTRAVENOUS at 18:42

## 2024-09-05 RX ADMIN — IOHEXOL 85 ML: 350 INJECTION, SOLUTION INTRAVENOUS at 19:37

## 2024-09-05 RX ADMIN — MAGNESIUM SULFATE HEPTAHYDRATE 2 G: 40 INJECTION, SOLUTION INTRAVENOUS at 16:52

## 2024-09-05 RX ADMIN — HEPARIN SODIUM 5000 UNITS: 5000 INJECTION, SOLUTION INTRAVENOUS; SUBCUTANEOUS at 21:56

## 2024-09-05 RX ADMIN — IPRATROPIUM BROMIDE 0.5 MG: 0.5 SOLUTION RESPIRATORY (INHALATION) at 15:49

## 2024-09-05 NOTE — ASSESSMENT & PLAN NOTE
Due to pneumonia       Continue PTA inhalers   Respiratory protocol   Duo nebs   Interval follow up

## 2024-09-05 NOTE — LETTER
Thank you for allowing us to participate in the care of your patient, Anna Ly, who was hospitalized from [unfilled] through 9/9/2024 with the admitting diagnosis of pneumonia .      Medication Changes:  Metoprolol changed to 25 mg TID  Complete cefpodoxime course     Outpatient testing recommended:  None     If you have any additional questions or would like to discuss further, please feel free to contact me.    Melanie Kinney MD  [facility] Internal Medicine, Hospitalist  [phone]

## 2024-09-05 NOTE — ASSESSMENT & PLAN NOTE
"No results found for: \"HGBA1C\"    No results for input(s): \"POCGLU\" in the last 72 hours.    Blood Sugar Average: Last 72 hrs:    Check HBA1C   SSI with accu checks     "

## 2024-09-05 NOTE — H&P
"[facility]  H&P  Name: Anna Ly 69 y.o. female I MRN: [id number]  Unit/Bed#: ED 16 I Date of Admission: 9/5/2024   Date of Service: 9/5/2024 I Hospital Day: 0      Assessment & Plan   Asthma exacerbation  Assessment & Plan  Due to pneumonia       Continue PTA inhalers   Respiratory protocol   Duo nebs   Interval follow up     Other hyperlipidemia  Assessment & Plan  Continue statins     Primary hypertension  Assessment & Plan  Continue metoprolol   Home dose 50 mg BID     Controlled type 2 diabetes mellitus without complication, without long-term current use of insulin (HCC)  Assessment & Plan  No results found for: \"HGBA1C\"    No results for input(s): \"POCGLU\" in the last 72 hours.    Blood Sugar Average: Last 72 hrs:    Check HBA1C   SSI with accu checks       * Sepsis (HCC)  Assessment & Plan  On presentation with tachycardia / leukocytosis with pneumonia as source of infection.     Procal elevated   LA: pending      Start ceftriaxone and doxycycline   Check CT chest   Trend procal   Check legionella/strep urine studies   Monitor vitals   Respiratory protocol   Duonebs   PTA inhaler   Am labs     Patient reports GI disturbance with pencillin use only. No angioedema / hives/ anaphylaxis            VTE Pharmacologic Prophylaxis:   High Risk (Score >/= 5) - Pharmacological DVT Prophylaxis Ordered: heparin. Sequential Compression Devices Ordered.  Code Status: Level 1 - Full Code   Discussion with family:  patient .     Anticipated Length of Stay: Patient will be admitted on an inpatient basis with an anticipated length of stay of greater than 2 midnights secondary to sepsis .    Total Time Spent on Date of Encounter in care of patient: 45 mins. This time was spent on one or more of the following: performing physical exam; counseling and coordination of care; obtaining or reviewing history; documenting in the medical record; reviewing/ordering tests, medications or procedures; " communicating with other healthcare professionals and discussing with patient's family/caregivers.    Chief Complaint: SOB and cough     History of Present Illness:  Anna Ly is a 69 y.o. female with a PMH of DMT2, pacemaker, HTN, obesity, asthma  who presents with progressing SOB and cough for the past 1 week. Cough is productive of yellowish/green phlegm. No hemoptysis, fever , chills, chest pain .     No N/V , diarrhea or constipation .   No active urinary symptoms.     Review of Systems:  Review of Systems   Constitutional:  Negative for chills, fatigue and fever.   HENT: Negative.  Negative for hearing loss.    Eyes: Negative.  Negative for visual disturbance.   Respiratory:  Positive for cough. Negative for wheezing.    Cardiovascular:  Negative for chest pain and palpitations.   Gastrointestinal:  Negative for abdominal pain, blood in stool, constipation, diarrhea, nausea and vomiting.   Endocrine: Negative.    Genitourinary:  Negative for difficulty urinating and dysuria.   Musculoskeletal:  Negative for arthralgias and myalgias.   Skin: Negative.    Allergic/Immunologic: Negative.    Neurological:  Negative for seizures and syncope.   Hematological:  Negative for adenopathy.   Psychiatric/Behavioral: Negative.         Past Medical and Surgical History:   Past Medical History:   Diagnosis Date    Cardiac disease     Diabetes mellitus (HCC)     GERD (gastroesophageal reflux disease)        Past Surgical History:   Procedure Laterality Date    CARDIAC PACEMAKER PLACEMENT       SECTION      x 2    JOINT REPLACEMENT         Meds/Allergies:  Prior to Admission medications    Medication Sig Start Date End Date Taking? Authorizing Provider   aspirin 81 mg chewable tablet Chew 81 mg daily.    Historical Provider, MD   DULoxetine (CYMBALTA) 60 mg delayed release capsule  3/17/19   Historical Provider, MD   gabapentin (NEURONTIN) 300 mg capsule  3/30/21   Historical Provider, MD WINSLOW  MG  per tablet  4/9/19   Historical Provider, MD   metoprolol succinate (TOPROL-XL) 100 mg 24 hr tablet  4/6/21   Historical Provider, MD   metoprolol succinate (TOPROL-XL) 50 mg 24 hr tablet Take 50 mg by mouth daily. 50 mg in am and 25 mg in the evening    Historical Provider, MD   pantoprazole (PROTONIX) 40 mg tablet  3/9/19   Historical Provider, MD   simvastatin (ZOCOR) 20 mg tablet Take 20 mg by mouth daily at bedtime.    Historical Provider, MD   cyclobenzaprine (FLEXERIL) 10 mg tablet Take 1 tablet (10 mg total) by mouth 2 (two) times a day as needed for muscle spasms 11/19/20 9/5/24  Dianne Diaz,      I have reviewed home medications with patient personally.    Allergies:   Allergies   Allergen Reactions    Codeine GI Intolerance    Morphine     Penicillins Other (See Comments)     unknown       Social History:  Marital Status:    Occupation: stable   Patient Pre-hospital Living Situation: Home  Patient Pre-hospital Level of Mobility: walks  Patient Pre-hospital Diet Restrictions: diabetic   Substance Use History:   Social History     Substance and Sexual Activity   Alcohol Use Yes    Comment: socially     Social History     Tobacco Use   Smoking Status Never   Smokeless Tobacco Never     Social History     Substance and Sexual Activity   Drug Use No       Family History:  Family History   Problem Relation Age of Onset    Parkinsonism Mother     No Known Problems Father     No Known Problems Brother     No Known Problems Maternal Aunt     No Known Problems Maternal Uncle     No Known Problems Paternal Aunt     No Known Problems Paternal Uncle     No Known Problems Maternal Grandmother     No Known Problems Maternal Grandfather     Breast cancer Paternal Grandmother 60    No Known Problems Paternal Grandfather     No Known Problems Daughter     No Known Problems Maternal Aunt     No Known Problems Paternal Aunt     ADD / ADHD Neg Hx     Anesthesia problems Neg Hx     Cancer Neg Hx     Clotting  "disorder Neg Hx     Collagen disease Neg Hx     Diabetes Neg Hx     Dislocations Neg Hx     Learning disabilities Neg Hx     Neurological problems Neg Hx     Osteoporosis Neg Hx     Rheumatologic disease Neg Hx     Scoliosis Neg Hx     Vascular Disease Neg Hx        Physical Exam:     Vitals:   Blood Pressure: 123/61 (09/05/24 1830)  Pulse: (!) 112 (09/05/24 1830)  Temperature: 99.6 °F (37.6 °C) (09/05/24 1720)  Temp Source: Oral (09/05/24 1720)  Respirations: 21 (09/05/24 1830)  Height: 5' 5\" (165.1 cm) (09/05/24 1511)  Weight - Scale: 89.8 kg (198 lb) (09/05/24 1511)  SpO2: 98 % (09/05/24 1830)    Physical Exam  Vitals and nursing note reviewed.   Constitutional:       General: She is not in acute distress.     Appearance: Normal appearance.   HENT:      Head: Normocephalic and atraumatic.      Mouth/Throat:      Mouth: Mucous membranes are moist.   Eyes:      Conjunctiva/sclera: Conjunctivae normal.      Pupils: Pupils are equal, round, and reactive to light.   Cardiovascular:      Rate and Rhythm: Normal rate and regular rhythm.      Pulses: Normal pulses.           Carotid pulses are 2+ on the right side and 2+ on the left side.       Radial pulses are 2+ on the right side and 2+ on the left side.        Dorsalis pedis pulses are 2+ on the right side and 2+ on the left side.      Heart sounds: Normal heart sounds, S1 normal and S2 normal. No murmur heard.  Pulmonary:      Effort: No tachypnea, bradypnea or accessory muscle usage.      Breath sounds: Normal air entry. Transmitted upper airway sounds present. Examination of the right-upper field reveals wheezing. Examination of the right-middle field reveals wheezing. Examination of the right-lower field reveals wheezing. Wheezing present. No decreased breath sounds, rhonchi or rales.   Musculoskeletal:      Right lower leg: No edema.      Left lower leg: No edema.   Neurological:      Mental Status: She is alert and oriented to person, place, and time. Mental " "status is at baseline.          Additional Data:     Lab Results:  Results from last 7 days   Lab Units 09/05/24  1539   WBC Thousand/uL 17.76*   HEMOGLOBIN g/dL 15.9*   HEMATOCRIT % 47.8*   PLATELETS Thousands/uL 424*   SEGS PCT % 85*   LYMPHO PCT % 7*   MONO PCT % 5   EOS PCT % 2     Results from last 7 days   Lab Units 09/05/24  1539   SODIUM mmol/L 132*   POTASSIUM mmol/L 4.1   CHLORIDE mmol/L 96   CO2 mmol/L 23   BUN mg/dL 22   CREATININE mg/dL 1.25   ANION GAP mmol/L 13   CALCIUM mg/dL 9.2   ALBUMIN g/dL 4.1   TOTAL BILIRUBIN mg/dL 1.16*   ALK PHOS U/L 56   ALT U/L 15   AST U/L 14   GLUCOSE RANDOM mg/dL 210*             No results found for: \"HGBA1C\"  Results from last 7 days   Lab Units 09/05/24  1539   PROCALCITONIN ng/ml 0.39*       Lines/Drains:  Invasive Devices       Peripheral Intravenous Line  Duration             Peripheral IV 09/05/24 Left Antecubital <1 day                        Imaging: Reviewed radiology reports from this admission including: chest xray  X-ray chest 1 view portable   Final Result by Harish Hollingsworth MD (09/05 1624)      No acute cardiopulmonary disease.            Workstation performed: [id number]             EKG and Other Studies Reviewed on Admission:   EKG: Personally Reviewed.    ** Please Note: This note has been constructed using a voice recognition system. **    "

## 2024-09-05 NOTE — ASSESSMENT & PLAN NOTE
On presentation with tachycardia / leukocytosis with pneumonia as source of infection.     Procal elevated   LA: pending      Start ceftriaxone and doxycycline   Check CT chest   Trend procal   Check legionella/strep urine studies   Monitor vitals   Respiratory protocol   Duonebs   PTA inhaler   Am labs     Patient reports GI disturbance with pencillin use only. No angioedema / hives/ anaphylaxis

## 2024-09-06 ENCOUNTER — APPOINTMENT (OUTPATIENT)
Dept: NON INVASIVE DIAGNOSTICS | Facility: HOSPITAL | Age: 69
DRG: 871 | End: 2024-09-06
Payer: COMMERCIAL

## 2024-09-06 PROBLEM — I95.9 HYPOTENSION: Status: ACTIVE | Noted: 2024-09-06

## 2024-09-06 PROBLEM — R65.20 SEVERE SEPSIS (HCC): Status: ACTIVE | Noted: 2024-09-05

## 2024-09-06 PROBLEM — J18.1 LOBAR PNEUMONIA (HCC): Status: ACTIVE | Noted: 2024-09-06

## 2024-09-06 LAB
4HR DELTA HS TROPONIN: 12 NG/L
ALBUMIN SERPL BCG-MCNC: 3.7 G/DL (ref 3.5–5)
ALP SERPL-CCNC: 54 U/L (ref 34–104)
ALT SERPL W P-5'-P-CCNC: 12 U/L (ref 7–52)
ANION GAP SERPL CALCULATED.3IONS-SCNC: 13 MMOL/L (ref 4–13)
AORTIC ROOT: 3.5 CM
AORTIC VALVE MEAN VELOCITY: 14.3 M/S
AST SERPL W P-5'-P-CCNC: 12 U/L (ref 13–39)
AV AREA BY CONTINUOUS VTI: 2.2 CM2
AV AREA PEAK VELOCITY: 1.9 CM2
AV LVOT MEAN GRADIENT: 2 MMHG
AV LVOT PEAK GRADIENT: 4 MMHG
AV MEAN GRADIENT: 9 MMHG
AV PEAK GRADIENT: 15 MMHG
AV VALVE AREA: 2.16 CM2
AV VELOCITY RATIO: 0.49
BACTERIA UR QL AUTO: NORMAL /HPF
BASOPHILS # BLD AUTO: 0.05 THOUSANDS/ÂΜL (ref 0–0.1)
BASOPHILS NFR BLD AUTO: 0 % (ref 0–1)
BILIRUB SERPL-MCNC: 0.94 MG/DL (ref 0.2–1)
BILIRUB UR QL STRIP: NEGATIVE
BSA FOR ECHO PROCEDURE: 1.97 M2
BUN SERPL-MCNC: 22 MG/DL (ref 5–25)
CALCIUM SERPL-MCNC: 9.2 MG/DL (ref 8.4–10.2)
CARDIAC TROPONIN I PNL SERPL HS: 21 NG/L
CHLORIDE SERPL-SCNC: 97 MMOL/L (ref 96–108)
CLARITY UR: CLEAR
CO2 SERPL-SCNC: 24 MMOL/L (ref 21–32)
COLOR UR: ABNORMAL
CREAT SERPL-MCNC: 1.29 MG/DL (ref 0.6–1.3)
DOP CALC AO PEAK VEL: 1.92 M/S
DOP CALC AO VTI: 22.89 CM
DOP CALC LVOT AREA: 3.8 CM2
DOP CALC LVOT CARDIAC INDEX: 2.63 L/MIN/M2
DOP CALC LVOT CARDIAC OUTPUT: 5.18 L/MIN
DOP CALC LVOT DIAMETER: 2.2 CM
DOP CALC LVOT PEAK VEL VTI: 12.99 CM
DOP CALC LVOT PEAK VEL: 0.95 M/S
DOP CALC LVOT STROKE INDEX: 24.4 ML/M2
DOP CALC LVOT STROKE VOLUME: 49.35
E WAVE DECELERATION TIME: 154 MS
E/A RATIO: 2.78
EOSINOPHIL # BLD AUTO: 0.21 THOUSAND/ÂΜL (ref 0–0.61)
EOSINOPHIL NFR BLD AUTO: 1 % (ref 0–6)
ERYTHROCYTE [DISTWIDTH] IN BLOOD BY AUTOMATED COUNT: 13.5 % (ref 11.6–15.1)
FRACTIONAL SHORTENING: 25 (ref 28–44)
GFR SERPL CREATININE-BSD FRML MDRD: 42 ML/MIN/1.73SQ M
GLUCOSE P FAST SERPL-MCNC: 140 MG/DL (ref 65–99)
GLUCOSE SERPL-MCNC: 140 MG/DL (ref 65–140)
GLUCOSE SERPL-MCNC: 150 MG/DL (ref 65–140)
GLUCOSE SERPL-MCNC: 161 MG/DL (ref 65–140)
GLUCOSE SERPL-MCNC: 177 MG/DL (ref 65–140)
GLUCOSE SERPL-MCNC: 183 MG/DL (ref 65–140)
GLUCOSE UR STRIP-MCNC: ABNORMAL MG/DL
HCT VFR BLD AUTO: 46.1 % (ref 34.8–46.1)
HGB BLD-MCNC: 15 G/DL (ref 11.5–15.4)
HGB UR QL STRIP.AUTO: ABNORMAL
IMM GRANULOCYTES # BLD AUTO: 0.09 THOUSAND/UL (ref 0–0.2)
IMM GRANULOCYTES NFR BLD AUTO: 1 % (ref 0–2)
INTERVENTRICULAR SEPTUM IN DIASTOLE (PARASTERNAL SHORT AXIS VIEW): 1.2 CM
INTERVENTRICULAR SEPTUM: 1.2 CM (ref 0.6–1.1)
KETONES UR STRIP-MCNC: ABNORMAL MG/DL
L PNEUMO1 AG UR QL IA.RAPID: NEGATIVE
LAAS-AP2: 17.8 CM2
LAAS-AP4: 14.1 CM2
LACTATE SERPL-SCNC: 1.9 MMOL/L (ref 0.5–2)
LACTATE SERPL-SCNC: 2.1 MMOL/L (ref 0.5–2)
LEFT ATRIUM AREA SYSTOLE SINGLE PLANE A4C: 14.6 CM2
LEFT ATRIUM SIZE: 3.1 CM
LEFT ATRIUM VOLUME (MOD BIPLANE): 43 ML
LEFT ATRIUM VOLUME INDEX (MOD BIPLANE): 21.8 ML/M2
LEFT INTERNAL DIMENSION IN SYSTOLE: 3.3 CM (ref 2.1–4)
LEFT VENTRICULAR INTERNAL DIMENSION IN DIASTOLE: 4.4 CM (ref 3.5–6)
LEFT VENTRICULAR POSTERIOR WALL IN END DIASTOLE: 1.3 CM
LEFT VENTRICULAR STROKE VOLUME: 42 ML
LEUKOCYTE ESTERASE UR QL STRIP: NEGATIVE
LVSV (TEICH): 42 ML
LYMPHOCYTES # BLD AUTO: 1.91 THOUSANDS/ÂΜL (ref 0.6–4.47)
LYMPHOCYTES NFR BLD AUTO: 12 % (ref 14–44)
MAGNESIUM SERPL-MCNC: 1.9 MG/DL (ref 1.9–2.7)
MCH RBC QN AUTO: 29.9 PG (ref 26.8–34.3)
MCHC RBC AUTO-ENTMCNC: 32.5 G/DL (ref 31.4–37.4)
MCV RBC AUTO: 92 FL (ref 82–98)
MONOCYTES # BLD AUTO: 1.18 THOUSAND/ÂΜL (ref 0.17–1.22)
MONOCYTES NFR BLD AUTO: 7 % (ref 4–12)
MV E'TISSUE VEL-LAT: 6 CM/S
MV E'TISSUE VEL-SEP: 4 CM/S
MV PEAK A VEL: 0.45 M/S
MV PEAK E VEL: 125 CM/S
MV STENOSIS PRESSURE HALF TIME: 45 MS
MV VALVE AREA P 1/2 METHOD: 4.89
NEUTROPHILS # BLD AUTO: 12.86 THOUSANDS/ÂΜL (ref 1.85–7.62)
NEUTS SEG NFR BLD AUTO: 79 % (ref 43–75)
NITRITE UR QL STRIP: NEGATIVE
NON-SQ EPI CELLS URNS QL MICRO: NORMAL /HPF
NRBC BLD AUTO-RTO: 0 /100 WBCS
PH UR STRIP.AUTO: 5.5 [PH]
PHOSPHATE SERPL-MCNC: 2.9 MG/DL (ref 2.3–4.1)
PLATELET # BLD AUTO: 369 THOUSANDS/UL (ref 149–390)
PMV BLD AUTO: 8.8 FL (ref 8.9–12.7)
POTASSIUM SERPL-SCNC: 3.8 MMOL/L (ref 3.5–5.3)
PROCALCITONIN SERPL-MCNC: 0.35 NG/ML
PROT SERPL-MCNC: 6.6 G/DL (ref 6.4–8.4)
PROT UR STRIP-MCNC: NEGATIVE MG/DL
PV PEAK GRADIENT: 3 MMHG
RBC # BLD AUTO: 5.01 MILLION/UL (ref 3.81–5.12)
RBC #/AREA URNS AUTO: NORMAL /HPF
RIGHT ATRIUM AREA SYSTOLE A4C: 14 CM2
RIGHT VENTRICLE ID DIMENSION: 2.7 CM
S PNEUM AG UR QL: NEGATIVE
SL CV LEFT ATRIUM LENGTH A2C: 5.2 CM
SL CV PED ECHO LEFT VENTRICLE DIASTOLIC VOLUME (MOD BIPLANE) 2D: 85 ML
SL CV PED ECHO LEFT VENTRICLE SYSTOLIC VOLUME (MOD BIPLANE) 2D: 44 ML
SODIUM SERPL-SCNC: 134 MMOL/L (ref 135–147)
SP GR UR STRIP.AUTO: <1.005 (ref 1–1.03)
TR MAX PG: 43 MMHG
TR PEAK VELOCITY: 3.3 M/S
TRICUSPID ANNULAR PLANE SYSTOLIC EXCURSION: 2 CM
TRICUSPID VALVE PEAK REGURGITATION VELOCITY: 3.29 M/S
UROBILINOGEN UR STRIP-ACNC: <2 MG/DL
WBC # BLD AUTO: 16.3 THOUSAND/UL (ref 4.31–10.16)
WBC #/AREA URNS AUTO: NORMAL /HPF

## 2024-09-06 PROCEDURE — 83735 ASSAY OF MAGNESIUM: CPT

## 2024-09-06 PROCEDURE — 93005 ELECTROCARDIOGRAM TRACING: CPT

## 2024-09-06 PROCEDURE — 83605 ASSAY OF LACTIC ACID: CPT

## 2024-09-06 PROCEDURE — 94760 N-INVAS EAR/PLS OXIMETRY 1: CPT

## 2024-09-06 PROCEDURE — 97165 OT EVAL LOW COMPLEX 30 MIN: CPT

## 2024-09-06 PROCEDURE — 80053 COMPREHEN METABOLIC PANEL: CPT

## 2024-09-06 PROCEDURE — 94640 AIRWAY INHALATION TREATMENT: CPT

## 2024-09-06 PROCEDURE — 84484 ASSAY OF TROPONIN QUANT: CPT

## 2024-09-06 PROCEDURE — 93306 TTE W/DOPPLER COMPLETE: CPT | Performed by: INTERNAL MEDICINE

## 2024-09-06 PROCEDURE — 81001 URINALYSIS AUTO W/SCOPE: CPT

## 2024-09-06 PROCEDURE — 85025 COMPLETE CBC W/AUTO DIFF WBC: CPT

## 2024-09-06 PROCEDURE — 99232 SBSQ HOSP IP/OBS MODERATE 35: CPT

## 2024-09-06 PROCEDURE — 84145 PROCALCITONIN (PCT): CPT

## 2024-09-06 PROCEDURE — 82948 REAGENT STRIP/BLOOD GLUCOSE: CPT

## 2024-09-06 PROCEDURE — 84100 ASSAY OF PHOSPHORUS: CPT

## 2024-09-06 PROCEDURE — 93306 TTE W/DOPPLER COMPLETE: CPT

## 2024-09-06 RX ORDER — GUAIFENESIN 600 MG/1
600 TABLET, EXTENDED RELEASE ORAL EVERY 12 HOURS SCHEDULED
Status: DISCONTINUED | OUTPATIENT
Start: 2024-09-06 | End: 2024-09-09 | Stop reason: HOSPADM

## 2024-09-06 RX ORDER — MIDODRINE HYDROCHLORIDE 5 MG/1
2.5 TABLET ORAL
Status: DISCONTINUED | OUTPATIENT
Start: 2024-09-06 | End: 2024-09-06

## 2024-09-06 RX ORDER — METOPROLOL SUCCINATE 25 MG/1
25 TABLET, EXTENDED RELEASE ORAL 2 TIMES DAILY
Status: DISCONTINUED | OUTPATIENT
Start: 2024-09-06 | End: 2024-09-06

## 2024-09-06 RX ORDER — SODIUM CHLORIDE 9 MG/ML
75 INJECTION, SOLUTION INTRAVENOUS CONTINUOUS
Status: DISCONTINUED | OUTPATIENT
Start: 2024-09-06 | End: 2024-09-06

## 2024-09-06 RX ORDER — METOPROLOL TARTRATE 25 MG/1
25 TABLET, FILM COATED ORAL EVERY 6 HOURS
Status: DISCONTINUED | OUTPATIENT
Start: 2024-09-06 | End: 2024-09-06

## 2024-09-06 RX ORDER — MIDODRINE HYDROCHLORIDE 5 MG/1
5 TABLET ORAL
Status: DISCONTINUED | OUTPATIENT
Start: 2024-09-06 | End: 2024-09-06

## 2024-09-06 RX ORDER — SODIUM CHLORIDE 9 MG/ML
75 INJECTION, SOLUTION INTRAVENOUS CONTINUOUS
Status: DISPENSED | OUTPATIENT
Start: 2024-09-06 | End: 2024-09-07

## 2024-09-06 RX ORDER — METOPROLOL SUCCINATE 25 MG/1
25 TABLET, EXTENDED RELEASE ORAL DAILY
Status: DISCONTINUED | OUTPATIENT
Start: 2024-09-06 | End: 2024-09-06

## 2024-09-06 RX ORDER — ALBUMIN, HUMAN INJ 5% 5 %
12.5 SOLUTION INTRAVENOUS ONCE
Status: COMPLETED | OUTPATIENT
Start: 2024-09-06 | End: 2024-09-06

## 2024-09-06 RX ORDER — METOPROLOL SUCCINATE 50 MG/1
50 TABLET, EXTENDED RELEASE ORAL DAILY
Status: DISCONTINUED | OUTPATIENT
Start: 2024-09-06 | End: 2024-09-06

## 2024-09-06 RX ORDER — METOPROLOL TARTRATE 25 MG/1
25 TABLET, FILM COATED ORAL EVERY 8 HOURS
Status: DISCONTINUED | OUTPATIENT
Start: 2024-09-07 | End: 2024-09-09 | Stop reason: HOSPADM

## 2024-09-06 RX ADMIN — HEPARIN SODIUM 5000 UNITS: 5000 INJECTION, SOLUTION INTRAVENOUS; SUBCUTANEOUS at 21:02

## 2024-09-06 RX ADMIN — GABAPENTIN 300 MG: 300 CAPSULE ORAL at 17:48

## 2024-09-06 RX ADMIN — GUAIFENESIN 600 MG: 600 TABLET, EXTENDED RELEASE ORAL at 21:02

## 2024-09-06 RX ADMIN — GABAPENTIN 300 MG: 300 CAPSULE ORAL at 09:09

## 2024-09-06 RX ADMIN — IPRATROPIUM BROMIDE AND ALBUTEROL SULFATE 3 ML: .5; 3 SOLUTION RESPIRATORY (INHALATION) at 13:21

## 2024-09-06 RX ADMIN — IPRATROPIUM BROMIDE AND ALBUTEROL SULFATE 3 ML: .5; 3 SOLUTION RESPIRATORY (INHALATION) at 02:28

## 2024-09-06 RX ADMIN — SODIUM CHLORIDE 250 ML: 0.9 INJECTION, SOLUTION INTRAVENOUS at 07:55

## 2024-09-06 RX ADMIN — DOXYCYCLINE 100 MG: 100 CAPSULE ORAL at 21:02

## 2024-09-06 RX ADMIN — IPRATROPIUM BROMIDE AND ALBUTEROL SULFATE 3 ML: .5; 3 SOLUTION RESPIRATORY (INHALATION) at 07:18

## 2024-09-06 RX ADMIN — ASPIRIN 81 MG CHEWABLE TABLET 81 MG: 81 TABLET CHEWABLE at 09:08

## 2024-09-06 RX ADMIN — FLUTICASONE FUROATE 1 PUFF: 200 POWDER RESPIRATORY (INHALATION) at 09:23

## 2024-09-06 RX ADMIN — DULOXETINE HYDROCHLORIDE 30 MG: 30 CAPSULE, DELAYED RELEASE ORAL at 09:08

## 2024-09-06 RX ADMIN — PRAVASTATIN SODIUM 40 MG: 40 TABLET ORAL at 16:16

## 2024-09-06 RX ADMIN — METOPROLOL SUCCINATE 25 MG: 25 TABLET, EXTENDED RELEASE ORAL at 14:05

## 2024-09-06 RX ADMIN — CEFTRIAXONE 1000 MG: 1 INJECTION, SOLUTION INTRAVENOUS at 16:04

## 2024-09-06 RX ADMIN — METOPROLOL SUCCINATE 25 MG: 25 TABLET, EXTENDED RELEASE ORAL at 01:30

## 2024-09-06 RX ADMIN — MIDODRINE HYDROCHLORIDE 5 MG: 5 TABLET ORAL at 16:16

## 2024-09-06 RX ADMIN — HEPARIN SODIUM 5000 UNITS: 5000 INJECTION, SOLUTION INTRAVENOUS; SUBCUTANEOUS at 14:05

## 2024-09-06 RX ADMIN — INSULIN LISPRO 1 UNITS: 100 INJECTION, SOLUTION INTRAVENOUS; SUBCUTANEOUS at 11:34

## 2024-09-06 RX ADMIN — GUAIFENESIN 600 MG: 600 TABLET, EXTENDED RELEASE ORAL at 11:35

## 2024-09-06 RX ADMIN — ALBUMIN (HUMAN) 12.5 G: 12.5 INJECTION, SOLUTION INTRAVENOUS at 17:49

## 2024-09-06 RX ADMIN — DOXYCYCLINE 100 MG: 100 CAPSULE ORAL at 09:08

## 2024-09-06 RX ADMIN — SODIUM CHLORIDE 100 ML/HR: 0.9 INJECTION, SOLUTION INTRAVENOUS at 09:19

## 2024-09-06 RX ADMIN — HEPARIN SODIUM 5000 UNITS: 5000 INJECTION, SOLUTION INTRAVENOUS; SUBCUTANEOUS at 05:02

## 2024-09-06 RX ADMIN — INSULIN LISPRO 1 UNITS: 100 INJECTION, SOLUTION INTRAVENOUS; SUBCUTANEOUS at 16:01

## 2024-09-06 RX ADMIN — IPRATROPIUM BROMIDE AND ALBUTEROL SULFATE 3 ML: .5; 3 SOLUTION RESPIRATORY (INHALATION) at 20:37

## 2024-09-06 RX ADMIN — INSULIN LISPRO 1 UNITS: 100 INJECTION, SOLUTION INTRAVENOUS; SUBCUTANEOUS at 07:46

## 2024-09-06 RX ADMIN — PANTOPRAZOLE SODIUM 40 MG: 40 TABLET, DELAYED RELEASE ORAL at 05:02

## 2024-09-06 NOTE — ASSESSMENT & PLAN NOTE
Lab Results   Component Value Date    HGBA1C 7.0 (H) 09/05/2024       Recent Labs     09/05/24  2049 09/06/24  0710 09/06/24  1121 09/06/24  1621   POCGLU 197* 150* 161* 177*         Blood Sugar Average: Last 72 hrs:  (P) 171.25    SSI with accu checks

## 2024-09-06 NOTE — UTILIZATION REVIEW
Initial Clinical Review    OBS 9/5 @ 1825 UPGRADED TO INPATIENT 9/6 @ 1535 FOR CONTINUED TX OF SEPSIS    Admission: Date/Time/Statement:   Admission Orders (From admission, onward)       Ordered        09/06/24 1535  INPATIENT ADMISSION  Once            09/05/24 1825  Place in Observation  Once                          Orders Placed This Encounter   Procedures    INPATIENT ADMISSION     Standing Status:   Standing     Number of Occurrences:   1     Order Specific Question:   Level of Care     Answer:   Med Surg [16]     Order Specific Question:   Estimated length of stay     Answer:   More than 2 Midnights     Order Specific Question:   Certification     Answer:   I certify that inpatient services are medically necessary for this patient for a duration of greater than two midnights. See H&P and MD Progress Notes for additional information about the patient's course of treatment.     ED Arrival Information       Expected   -    Arrival   9/5/2024 15:09    Acuity   Urgent              Means of arrival   Walk-In    Escorted by   Family Member    Service   Hospitalist    Admission type   Emergency              Arrival complaint   sob             Chief Complaint   Patient presents with    Shortness of Breath     States she's been sick for about a week with cough and started vomiting yesterday and has hx of asthma       Initial Presentation: 69 y.o. female with PMHx of T2DM, PPM, HTN, HLD, obesity, asthma to ED as a walk-in with progressive SOB and cough for past 1 wk. Cough is productive of yellowish/green phlegm. + wheezing on exam. T 97.3, tachycardic. WBC 17.76, Hgb 15.9, Hct 47.8, Plts 424. Sodium 132, T bili 1.16, glucose 210. Procal 0.39. CXR neg. Admitted under observation then upgraded to inpatient for Sepsis -- Start ceftriaxone and doxycycline. Check CT chest. Trend procal. Check legionella/strep urine studies. Gentle IVFs. Duonebs, PTA inhalers, po meds. Accu-checks w/ ssi.     Anticipated Length of  Stay/Certification Statement:  Patient will be admitted on an inpatient basis with an anticipated length of stay of greater than 2 midnights secondary to sepsis .     Date: 9/6 -- Upgraded to Inpatient  Tachycardic, reports improvement in breathing. Still with productive cough. + tr pitting edema in b/l le. Remains on RA. Hypotensive - albumin IV x1 given, continue IVFs @ 75 ml/hr. DC midodrine. Monitor I/Os, daily wts. Po meds. SCDs.      ED Triage Vitals   Temperature Pulse Respirations Blood Pressure SpO2 Pain Score   09/05/24 1511 09/05/24 1511 09/05/24 1511 09/05/24 1514 09/05/24 1514 09/05/24 1511   (!) 97.3 °F (36.3 °C) 68 18 132/79 97 % 6     Weight (last 2 days)       Date/Time Weight    09/06/24 1203 90.3 (199)    09/06/24 0553 90.4 (199.2)    09/1955 89 (196.2)    09/05/24 1511 89.8 (198)            Vital Signs (last 3 days)       Date/Time Temp Pulse Resp BP MAP (mmHg) SpO2 O2 Device Patient Position - Orthostatic VS Pain    09/06/24 17:19:20 -- 68 -- 120/45 70 96 % -- -- --    09/06/24 1719 -- 47 -- 120/45 70 94 % -- -- --    09/06/24 15:49:33 -- 65 -- 95/45 62 94 % -- -- --    09/06/24 15:49:13 -- 118 18 95/45 62 94 % -- -- --    09/06/24 14:03:23 -- 120 -- 119/72 88 93 % -- -- --    09/06/24 1333 -- -- -- -- -- 100 % None (Room air) -- --    09/06/24 1322 -- -- -- -- -- 93 % None (Room air) -- --    09/06/24 1255 -- -- -- -- -- -- -- -- No Pain    09/06/24 12:29:36 -- 118 -- 122/72 89 95 % -- -- --    09/06/24 1203 -- 108 -- 90/60 -- -- -- -- --    09/06/24 0914 -- -- -- 90/60 -- -- -- -- --    09/06/24 09:05:13 -- 62 -- 85/58 67 95 % None (Room air) -- --    09/06/24 0800 -- -- -- -- -- -- -- -- No Pain    09/06/24 0730 -- -- -- -- -- 100 % None (Room air) -- --    09/06/24 0718 -- -- -- -- -- 93 % None (Room air) -- --    09/06/24 04:17:24 -- 50 -- 123/43 70 96 % -- -- --    09/06/24 0228 -- -- -- -- -- -- None (Room air) -- --    09/06/24 0130 -- -- -- 108/68 -- -- -- -- --    09/05/24 2300  -- -- -- -- -- -- -- -- No Pain    09/05/24 2236 -- -- -- -- -- -- None (Room air) -- --    09/05/24 22:27:32 -- 114 18 118/72 87 95 % -- -- --    09/1955 97.3 °F (36.3 °C) 109 -- 99/46 64 96 % -- -- No Pain    09/05/24 19:48:13 -- 62 18 99/46 64 95 % -- -- --    09/05/24 1830 -- 112 21 123/61 86 98 % None (Room air) Lying --    09/05/24 1720 99.6 °F (37.6 °C) 93 18 115/56 -- 94 % None (Room air) Lying --    09/05/24 1700 -- 68 26 109/59 80 96 % None (Room air) -- --    09/05/24 1630 -- 74 22 102/61 73 -- -- -- --    09/05/24 1605 -- -- -- -- -- -- None (Room air) -- --    09/05/24 1514 -- -- 22 132/79 -- 97 % -- -- --    09/05/24 1511 97.3 °F (36.3 °C) 68 18 -- -- -- -- -- 6              Pertinent Labs/Diagnostic Test Results:   Radiology:  CT chest w contrast   Final Interpretation by Sandhya Nix MD (09/05 2040)      Mild patchy bilateral consolidation and groundglass opacity, greatest in the right lower lobe, compatible with pneumonia.      No cavitation.      No pleural effusion/empyema.         Workstation performed: [id number]         X-ray chest 1 view portable   Final Interpretation by Harish Hollingsworth MD (09/05 1624)      No acute cardiopulmonary disease.            Workstation performed: [id number]           Cardiology:  Echo complete w/ contrast if indicated   Final Result by Yadira Esparza MD (09/06 1334)        Left Ventricle: Left ventricular cavity size is normal. Wall thickness    is mildly increased. There is mild concentric hypertrophy. The left    ventricular ejection fraction is %.  GLS is -2.6% systolic function is    severely reduced. The LV apex and the adjoining inferoseptal and    anterolateral walls are severely hypokinetic.  The basal segments appear    to contract normally. Diastolic function is abnormal.     IVS: There is abnormal septal motion consistent with right ventricular    pacing. There is sigmoid appearance of the septum.     Right Ventricle: Systolic  function is normal. Normal tricuspid annular    plane systolic excursion (TAPSE) > 1.7 cm.     Mitral Valve: There is moderate annular calcification. There is trace    regurgitation.     Tricuspid Valve: There is mild regurgitation. The right ventricular    systolic pressure is mildly elevated at 46 mmHg.     Pulmonic Valve: There is trace regurgitation.         ECG 12 lead   Final Result by Alfredo Gee MD (09/05 2309)   Atrial-sensed ventricular-paced rhythm   Abnormal ECG   No previous ECGs available   Confirmed by Alfredo Gee ([id number]) on 9/5/2024 11:09:43 PM          Results from last 7 days   Lab Units 09/05/24  1539   SARS-COV-2  Negative     Results from last 7 days   Lab Units 09/06/24  0455 09/05/24  1539   WBC Thousand/uL 16.30* 17.76*   HEMOGLOBIN g/dL 15.0 15.9*   HEMATOCRIT % 46.1 47.8*   PLATELETS Thousands/uL 369 424*   TOTAL NEUT ABS Thousands/µL 12.86* 15.10*       Results from last 7 days   Lab Units 09/06/24  0455 09/05/24  1539   SODIUM mmol/L 134* 132*   POTASSIUM mmol/L 3.8 4.1   CHLORIDE mmol/L 97 96   CO2 mmol/L 24 23   ANION GAP mmol/L 13 13   BUN mg/dL 22 22   CREATININE mg/dL 1.29 1.25   EGFR ml/min/1.73sq m 42 44   CALCIUM mg/dL 9.2 9.2   MAGNESIUM mg/dL 1.9 1.3*   PHOSPHORUS mg/dL 2.9  --      Results from last 7 days   Lab Units 09/06/24 0455 09/05/24  1539   AST U/L 12* 14   ALT U/L 12 15   ALK PHOS U/L 54 56   TOTAL PROTEIN g/dL 6.6 6.9   ALBUMIN g/dL 3.7 4.1   TOTAL BILIRUBIN mg/dL 0.94 1.16*     Results from last 7 days   Lab Units 09/06/24  1621 09/06/24  1121 09/06/24  0710 09/05/24  2049   POC GLUCOSE mg/dl 177* 161* 150* 197*     Results from last 7 days   Lab Units 09/06/24  0455 09/05/24  1539   GLUCOSE RANDOM mg/dL 140 210*       Results from last 7 days   Lab Units 09/05/24  1539   HEMOGLOBIN A1C % 7.0*   EAG mg/dl 154     Results from last 7 days   Lab Units 09/06/24  0004 09/05/24  1818 09/05/24  1539   HS TNI 0HR ng/L  --   --  9   HS TNI 2HR ng/L  --  12  --    HSTNI D2  ng/L  --  3  --    HS TNI 4HR ng/L 21  --   --    HSTNI D4 ng/L 12  --   --      Results from last 7 days   Lab Units 09/06/24  0455 09/05/24  1539   PROCALCITONIN ng/ml 0.35* 0.39*     Results from last 7 days   Lab Units 09/06/24  0601 09/06/24  0004   LACTIC ACID mmol/L 1.9 2.1*     Results from last 7 days   Lab Units 09/05/24  1539   BNP pg/mL 330*     Results from last 7 days   Lab Units 09/06/24  1232   CLARITY UA  Clear   COLOR UA  Light Yellow   SPEC GRAV UA  <1.005*   PH UA  5.5   GLUCOSE UA mg/dl 1000 (1%)*   KETONES UA mg/dl Trace*   BLOOD UA  Trace*   PROTEIN UA mg/dl Negative   NITRITE UA  Negative   BILIRUBIN UA  Negative   UROBILINOGEN UA (BE) mg/dl <2.0   LEUKOCYTES UA  Negative   WBC UA /hpf 2-4   RBC UA /hpf 0-1   BACTERIA UA /hpf Occasional   EPITHELIAL CELLS WET PREP /hpf Occasional     Results from last 7 days   Lab Units 09/05/24  2205 09/05/24  1539   STREP PNEUMONIAE ANTIGEN, URINE  Negative  --    LEGIONELLA URINARY ANTIGEN  Negative  --    INFLUENZA A PCR   --  Negative   INFLUENZA B PCR   --  Negative   RSV PCR   --  Negative     Results from last 7 days   Lab Units 09/05/24  1758   BLOOD CULTURE  Received in Microbiology Lab. Culture in Progress.  Received in Microbiology Lab. Culture in Progress.     ED Treatment-Medication Administration from 09/05/2024 1509 to 09/05/2024 1941         Date/Time Order Dose Route Action     09/05/2024 1548 albuterol inhalation solution 5 mg 5 mg Nebulization Given     09/05/2024 1549 ipratropium (ATROVENT) 0.02 % inhalation solution 0.5 mg 0.5 mg Nebulization Given     09/05/2024 1546 acetaminophen (Ofirmev) injection 1,000 mg 1,000 mg Intravenous New Bag     09/05/2024 1652 magnesium sulfate 2 g/50 mL IVPB (premix) 2 g 2 g Intravenous New Bag     09/05/2024 1842 cefTRIAXone (ROCEPHIN) IVPB (premix in dextrose) 1,000 mg 50 mL 1,000 mg Intravenous New Bag     09/05/2024 1911 doxycycline hyclate (VIBRAMYCIN) capsule 100 mg 100 mg Oral Given         Past  Medical History:   Diagnosis Date    Cardiac disease     Diabetes mellitus (HCC)     GERD (gastroesophageal reflux disease)      Present on Admission:   Controlled type 2 diabetes mellitus without complication, without long-term current use of insulin (HCC)   Primary hypertension   Other hyperlipidemia      Admitting Diagnosis: Pneumonia [J18.9]  SOB (shortness of breath) [R06.02]  Age/Sex: 69 y.o. female  Admission Orders:  Scheduled Medications:  Albumin 5%, 12.5 g, Intravenous, Once  aspirin, 81 mg, Oral, Daily  cefTRIAXone, 1,000 mg, Intravenous, Q24H  doxycycline hyclate, 100 mg, Oral, Q12H SAMANTHA  DULoxetine, 30 mg, Oral, Daily  fluticasone, 1 puff, Inhalation, Daily  gabapentin, 300 mg, Oral, BID  guaiFENesin, 600 mg, Oral, Q12H SAMANTHA  heparin (porcine), 5,000 Units, Subcutaneous, Q8H SAMANTHA  insulin lispro, 1-6 Units, Subcutaneous, TID AC  ipratropium-albuterol, 3 mL, Nebulization, Q6H  [START ON 9/7/2024] metoprolol tartrate, 25 mg, Oral, Q8H  pantoprazole, 40 mg, Oral, Early Morning  pravastatin, 40 mg, Oral, Daily With Dinner    Continuous IV Infusions:  sodium chloride, 75 mL/hr, Intravenous, Continuous    PRN Meds:  sodium chloride (PF), 3 mL, Intravenous, Q1H PRN        IP CONSULT TO CASE MANAGEMENT    Network Utilization Review Department  ATTENTION: Please call with any questions or concerns to [phone] and carefully listen to the prompts so that you are directed to the right person. All voicemails are confidential.   For Discharge needs, contact Care Management DC Support Team at [phone] opt. 2  Send all requests for admission clinical reviews, approved or denied determinations and any other requests to dedicated fax number below belonging to the campus where the patient is receiving treatment. List of dedicated fax numbers for the Facilities:  FACILITY NAME UR FAX NUMBER   ADMISSION DENIALS (Administrative/Medical Necessity) [phone]   DISCHARGE SUPPORT TEAM (NETWORK) [phone]   PARENT  CHILD HEALTH (Maternity/NICU/Pediatrics) [phone]   [facility] [phone]   [facility] [phone]   [facility] [phone]   [facility] [phone]   [facility] [phone]   [facility] [phone]   [facility] [phone]   [facility] [phone]   [facility] [phone]   [facility] [phone]   [facility] [phone]   [facility] [phone]

## 2024-09-06 NOTE — ASSESSMENT & PLAN NOTE
On presentation with tachycardia / leukocytosis with pneumonia as source of infection.   No IVF fluids given in ED   No LA checked in ED     Procal with reflex trending down   legionella/strep urine studies negative   LA: 2.1 > 1.9   S/p  ml x 1       CT chest: Mild patchy bilateral consolidation and groundglass opacity, greatest in the right lower lobe, compatible with pneumonia.     Continue ceftriaxone and doxycycline   Start NS IVF @ 100 ml/hr   Trend procal   Monitor vitals   Respiratory protocol   Duonebs   PTA inhaler   Am labs

## 2024-09-06 NOTE — SEPSIS NOTE
"  Sepsis Note   Anna Ly 69 y.o. female MRN: [id number]  Unit/Bed#: 2 [place] [address] A Encounter: [id number]       Initial Sepsis Screening       Row Name 09/06/24 1146                Is the patient's history suggestive of a new or worsening infection? Yes (Proceed)  -AA        Suspected source of infection pneumonia  -AA        Indicate SIRS criteria Hyperthemia > 38.3C (100.9F) OR Hypothermia <36C (96.8F);Tachycardia > 90 bpm;Leukocytosis (WBC > 18734 IJL) OR Leukopenia (WBC <4000 IJL) OR Bandemia (WBC >10% bands)  -AA        Are two or more of the above signs & symptoms of infection both present and new to the patient? Yes (Proceed)  -AA        Assess for evidence of organ dysfunction: Are any of the below criteria present within 6 hours of suspected infection and SIRS criteria that are NOT considered to be chronic conditions? Lactate > 2.0  -AA        Date of presentation of severe sepsis 09/05/24  -AA        Time of presentation of severe sepsis --        Sepsis Note: Click \"NEXT\" below (NOT \"close\") to generate sepsis note based on above information. YES (proceed by clicking \"NEXT\")  -AA                  User Key  (r) = Recorded By, (t) = Taken By, (c) = Cosigned By      Initials Name Provider Type    AA Melanie Kinney MD Physician                        Body mass index is 33.15 kg/m².  Wt Readings from Last 1 Encounters:   09/06/24 90.4 kg (199 lb 3.2 oz)     IBW (Ideal Body Weight): 57 kg    Ideal body weight: 57 kg (125 lb 10.6 oz)  Adjusted ideal body weight: 70.3 kg (155 lb 1.2 oz)    "

## 2024-09-06 NOTE — ASSESSMENT & PLAN NOTE
Her medications includes metoprolol XR 50 mg a.m., 25 mg PM.  Currently on hold.  Patient transition to metoprolol 25 mg every 8.  Hold parameters discussed with nursing

## 2024-09-06 NOTE — SEPSIS NOTE
"  Sepsis Note   Anna Ly 69 y.o. female MRN: [id number]  Unit/Bed#: 2 [place] [address] A Encounter: [id number]       Initial Sepsis Screening       Row Name 09/06/24 1146                Is the patient's history suggestive of a new or worsening infection? Yes (Proceed)  -AA        Suspected source of infection pneumonia  -AA        Indicate SIRS criteria Hyperthemia > 38.3C (100.9F) OR Hypothermia <36C (96.8F);Tachycardia > 90 bpm;Leukocytosis (WBC > 18968 IJL) OR Leukopenia (WBC <4000 IJL) OR Bandemia (WBC >10% bands)  -AA        Are two or more of the above signs & symptoms of infection both present and new to the patient? Yes (Proceed)  -AA        Assess for evidence of organ dysfunction: Are any of the below criteria present within 6 hours of suspected infection and SIRS criteria that are NOT considered to be chronic conditions? Lactate > 2.0  -AA        Date of presentation of severe sepsis 09/05/24  -AA        Time of presentation of severe sepsis --        Sepsis Note: Click \"NEXT\" below (NOT \"close\") to generate sepsis note based on above information. YES (proceed by clicking \"NEXT\")  -AA                  User Key  (r) = Recorded By, (t) = Taken By, (c) = Cosigned By      Initials Name Provider Type    AA Melanie Kinney MD Physician                        Body mass index is 33.15 kg/m².  Wt Readings from Last 1 Encounters:   09/06/24 90.4 kg (199 lb 3.2 oz)     IBW (Ideal Body Weight): 57 kg    Ideal body weight: 57 kg (125 lb 10.6 oz)  Adjusted ideal body weight: 70.3 kg (155 lb 1.2 oz)    "

## 2024-09-06 NOTE — PROGRESS NOTES
[facility]  Progress Note  Name: Anna Ly I  MRN: [id number]  Unit/Bed#: 2 [facility] [address] A I Date of Admission: 9/5/2024   Date of Service: 9/6/2024 I Hospital Day: 0    Assessment & Plan   Hypotension  Assessment & Plan  Continue to hold Lasix   Metoprolol with hold parameters     -s/p IVF NS x 1  250 ml/hr given history of CHF . Unclear type no echo in chart.   -responding to IVF   -currently on NS @ 75 ml/hr   -monitor vitals Q 4     Lobar pneumonia (HCC)  Assessment & Plan  Refer to A and P for sepsis     Asthma exacerbation  Assessment & Plan  Due to pneumonia       Continue PTA inhalers   Respiratory protocol   Duo nebs   Interval follow up     Other hyperlipidemia  Assessment & Plan  Continue statins     Primary hypertension  Assessment & Plan  Continue metoprolol   Home dose 50 mg BID     Controlled type 2 diabetes mellitus without complication, without long-term current use of insulin (HCC)  Assessment & Plan  Lab Results   Component Value Date    HGBA1C 7.0 (H) 09/05/2024       Recent Labs     09/05/24  2049 09/06/24  0710 09/06/24  1121 09/06/24  1621   POCGLU 197* 150* 161* 177*         Blood Sugar Average: Last 72 hrs:  (P) 171.25    SSI with accu checks       * Severe sepsis (HCC)  Assessment & Plan  On presentation with tachycardia / leukocytosis with pneumonia as source of infection.   No IVF fluids given in ED   No LA checked in ED     Procal with reflex trending down   legionella/strep urine studies negative   LA: 2.1 > 1.9   S/p  ml x 1   Currently on 75 ml/hr IVF . Weight based IVF not given because the patient has history of systolic CHF with concern for volume overload.     CT chest: Mild patchy bilateral consolidation and groundglass opacity, greatest in the right lower lobe, compatible with pneumonia.     Continue ceftriaxone and doxycycline   Start NS IVF @ 75 ml/hr   Trend procal   Monitor vitals   Respiratory protocol   Duonebs   PTA inhaler   Am labs                 VTE Pharmacologic Prophylaxis:   Moderate Risk (Score 3-4) - Pharmacological DVT Prophylaxis Ordered: heparin.    Mobility:   Basic Mobility Inpatient Raw Score: 24  JH-HLM Goal: 8: Walk 250 feet or more  JH-HLM Achieved: 7: Walk 25 feet or more  JH-HLM Goal achieved. Continue to encourage appropriate mobility.    Patient Centered Rounds: I performed bedside rounds with nursing staff today.   Discussions with Specialists or Other Care Team Provider: PTOT RHONDA     Education and Discussions with Family / Patient:  patient and daughter at [facility]. .     Total Time Spent on Date of Encounter in care of patient: 30 mins. This time was spent on one or more of the following: performing physical exam; counseling and coordination of care; obtaining or reviewing history; documenting in the medical record; reviewing/ordering tests, medications or procedures; communicating with other healthcare professionals and discussing with patient's family/caregivers.    Current Length of Stay: 0 day(s)  Current Patient Status: Inpatient   Certification Statement: The patient will continue to require additional inpatient hospital stay due to severe sepsis   Discharge Plan: Anticipate discharge in 48 hrs to home.    Code Status: Level 1 - Full Code    Subjective:   Patient was seen today at bedside. Reports improvement in breathing .   Still with productive cough     No chest pain or tightness, SOB, dizziness or light headedness, N/V, Diarrhea of constipation.   No active urinary symptoms  Tolerating oral diet.       Objective:     Vitals:   Temp (24hrs), Av.5 °F (36.9 °C), Min:97.3 °F (36.3 °C), Max:99.6 °F (37.6 °C)    Temp:  [97.3 °F (36.3 °C)-99.6 °F (37.6 °C)] 97.3 °F (36.3 °C)  HR:  [] 65  Resp:  [18-26] 18  BP: ()/(43-72) 95/45  SpO2:  [93 %-100 %] 94 %  Body mass index is 33.12 kg/m².     Input and Output Summary (last 24 hours):     Intake/Output Summary (Last 24 hours) at 2024 1639  Last data  filed at 9/6/2024 0919  Gross per 24 hour   Intake 1200 ml   Output --   Net 1200 ml       Physical Exam:   Physical Exam  Vitals and nursing note reviewed.   Constitutional:       General: She is not in acute distress.     Appearance: Normal appearance.   HENT:      Head: Normocephalic and atraumatic.      Mouth/Throat:      Mouth: Mucous membranes are moist.   Eyes:      Conjunctiva/sclera: Conjunctivae normal.      Pupils: Pupils are equal, round, and reactive to light.   Cardiovascular:      Rate and Rhythm: Normal rate and regular rhythm.      Pulses: Normal pulses.           Carotid pulses are 2+ on the right side and 2+ on the left side.       Radial pulses are 2+ on the right side and 2+ on the left side.        Dorsalis pedis pulses are 2+ on the right side and 2+ on the left side.      Heart sounds: Normal heart sounds, S1 normal and S2 normal. No murmur heard.     Comments: Trace pitting edema noted.   Pulmonary:      Effort: No tachypnea, bradypnea or accessory muscle usage.      Breath sounds: Normal breath sounds and air entry. Transmitted upper airway sounds present. No decreased breath sounds, wheezing, rhonchi or rales.   Abdominal:      General: Abdomen is flat. Bowel sounds are normal. There is no distension.      Palpations: Abdomen is soft.      Tenderness: There is no abdominal tenderness.   Musculoskeletal:      Right lower leg: Edema present.      Left lower leg: Edema present.   Neurological:      Mental Status: She is alert and oriented to person, place, and time. Mental status is at baseline.          Additional Data:     Labs:  Results from last 7 days   Lab Units 09/06/24  0455   WBC Thousand/uL 16.30*   HEMOGLOBIN g/dL 15.0   HEMATOCRIT % 46.1   PLATELETS Thousands/uL 369   SEGS PCT % 79*   LYMPHO PCT % 12*   MONO PCT % 7   EOS PCT % 1     Results from last 7 days   Lab Units 09/06/24  0455   SODIUM mmol/L 134*   POTASSIUM mmol/L 3.8   CHLORIDE mmol/L 97   CO2 mmol/L 24   BUN mg/dL 22    CREATININE mg/dL 1.29   ANION GAP mmol/L 13   CALCIUM mg/dL 9.2   ALBUMIN g/dL 3.7   TOTAL BILIRUBIN mg/dL 0.94   ALK PHOS U/L 54   ALT U/L 12   AST U/L 12*   GLUCOSE RANDOM mg/dL 140         Results from last 7 days   Lab Units 09/06/24  1621 09/06/24  1121 09/06/24  0710 09/05/24  2049   POC GLUCOSE mg/dl 177* 161* 150* 197*     Results from last 7 days   Lab Units 09/05/24  1539   HEMOGLOBIN A1C % 7.0*     Results from last 7 days   Lab Units 09/06/24  0601 09/06/24  0455 09/06/24  0004 09/05/24  1539   LACTIC ACID mmol/L 1.9  --  2.1*  --    PROCALCITONIN ng/ml  --  0.35*  --  0.39*       Lines/Drains:  Invasive Devices       Peripheral Intravenous Line  Duration             Peripheral IV 09/05/24 Left Antecubital 1 day                          Imaging: Reviewed radiology reports from this admission including: chest CT scan    Recent Cultures (last 7 days):   Results from last 7 days   Lab Units 09/05/24  2205 09/05/24  1758   BLOOD CULTURE   --  Received in Microbiology Lab. Culture in Progress.  Received in Microbiology Lab. Culture in Progress.   LEGIONELLA URINARY ANTIGEN  Negative  --        Last 24 Hours Medication List:   Current Facility-Administered Medications   Medication Dose Route Frequency Provider Last Rate    Albumin 5%  12.5 g Intravenous Once Melaine Kinney MD      aspirin  81 mg Oral Daily Melanie Kinney MD      cefTRIAXone  1,000 mg Intravenous Q24H Melanie Kinney MD 1,000 mg (09/06/24 1604)    doxycycline hyclate  100 mg Oral Q12H [facility] Melanie Kinney MD      DULoxetine  30 mg Oral Daily Melanie Kinney MD      fluticasone  1 puff Inhalation Daily Melanie Kinnye MD      gabapentin  300 mg Oral BID Melanie Kinney MD      guaiFENesin  600 mg Oral Q12H [facility] Melanie Kinney MD      heparin (porcine)  5,000 Units Subcutaneous Q8H [facility] Melanie Kinney MD      insulin lispro  1-6 Units Subcutaneous TID  Melanie Kinney MD      ipratropium-albuterol  3 mL Nebulization Q6H Melanie Kinney MD       metoprolol tartrate  25 mg Oral Q6H Melanie Kinney MD      pantoprazole  40 mg Oral Early Morning Melanie Kinney MD      pravastatin  40 mg Oral Daily With Dinner Melanie Kinney MD      sodium chloride (PF)  3 mL Intravenous Q1H PRN Melanie Kinney MD      sodium chloride  75 mL/hr Intravenous Continuous Melanie Kinney MD 75 mL/hr (09/06/24 8632)        Today, Patient Was Seen By: Melanie Kinney MD    **Please Note: This note may have been constructed using a voice recognition system.**

## 2024-09-06 NOTE — PLAN OF CARE
Problem: DISCHARGE PLANNING  Goal: Discharge to home or other facility with appropriate resources  Description: INTERVENTIONS:  - Identify barriers to discharge w/patient and caregiver  - Arrange for needed discharge resources and transportation as appropriate  - Identify discharge learning needs (meds, wound care, etc.)  - Arrange for interpretive services to assist at discharge as needed  - Refer to Case Management Department for coordinating discharge planning if the patient needs post-hospital services based on physician/advanced practitioner order or complex needs related to functional status, cognitive ability, or social support system  Outcome: Progressing     Problem: SAFETY ADULT  Goal: Patient will remain free of falls  Description: INTERVENTIONS:  - Educate patient/family on patient safety including physical limitations  - Instruct patient to call for assistance with activity   - Consult OT/PT to assist with strengthening/mobility   - Keep Call bell within reach  - Keep bed low and locked with side rails adjusted as appropriate  - Keep care items and personal belongings within reach  - Initiate and maintain comfort rounds  - Make Fall Risk Sign visible to staff     Problem: SAFETY ADULT  Goal: Maintain or return to baseline ADL function  Description: INTERVENTIONS:  -  Assess patient's ability to carry out ADLs; assess patient's baseline for ADL function and identify physical deficits which impact ability to perform ADLs (bathing, care of mouth/teeth, toileting, grooming, dressing, etc.)  - Assess/evaluate cause of self-care deficits   - Assess range of motion  - Assess patient's mobility; develop plan if impaired  - Assess patient's need for assistive devices and provide as appropriate  - Encourage maximum independence but intervene and supervise when necessary  - Involve family in performance of ADLs  - Assess for home care needs following discharge   - Consider OT consult to assist with ADL  evaluation and planning for discharge  - Provide patient education as appropriate  Outcome: Progressing

## 2024-09-06 NOTE — ASSESSMENT & PLAN NOTE
Continue to hold Lasix   Metoprolol with hold parameters     -s/p IVF NS x 1  250 ml/hr given history of CHF . Unclear type no echo in chart.   -currently on NS @ 100 ml/hr   -monitor vitals Q 4

## 2024-09-06 NOTE — NURSING NOTE
Rec'd pt stable in bed via stretcher. Oriented pt to unit and room. Call bell at bedside and within reach. Admission assessment completed at bedside. Vitals obtained-wdl. At this time, pt denies any pain or discomfort. Pt denies any questions or concerns at this time. Will continue to monitor

## 2024-09-06 NOTE — ASSESSMENT & PLAN NOTE
Continue to hold Lasix   Metoprolol with hold parameters     -s/p IVF NS bolus 250 + albumin 250 with good response. MAP at 65 +    given history of CHF no aggressive fluid resuscitation was given   Case was discussed with ICU team and they agreed with the plan above on 9/6     -continue IVF @ 100 ml/hr  -administer albumin 250 x 1    -monitor vitals Q 4

## 2024-09-06 NOTE — ASSESSMENT & PLAN NOTE
On presentation with tachycardia / leukocytosis with pneumonia as source of infection.   No IVF fluids given in ED   No LA checked in ED     Procal trending down from 0.39 to 0.16   legionella/strep urine studies negative   LA: 2.1 > 1.9   S/p  ml x 1 , Albumin 250 x 1 with good BP response   On low rate IVF @ 75 ml/hr     Weight-based IV fluids were not given secondary to concern for heart failure given the patient have systolic heart failure with no echo at baseline on chart.    CT chest: Mild patchy bilateral consolidation and groundglass opacity, greatest in the right lower lobe, compatible with pneumonia.     Continue ceftriaxone and doxycycline   continue NS IVF @ 100 ml/hr   Procal normalized today   Monitor vitals   Respiratory protocol   Duonebs   PTA inhaler   Am labs

## 2024-09-06 NOTE — OCCUPATIONAL THERAPY NOTE
"  OT EVALUATION    Patient is independent with ADLS and ambulation.  No further skilled OT/PT or adaptive equipment needs at this time.  Discharge recommendations home.       09/06/24 1255   OT Last Visit   OT Visit Date 09/06/24   Note Type   Note type Evaluation   Pain Assessment   Pain Assessment Tool 0-10   Pain Score No Pain   Home Living   Type of Home House   Home Layout One level;Stairs to enter with rails  (3 ABIODUN)   Bathroom Shower/Tub Tub/shower unit   Bathroom Toilet Standard   Bathroom Equipment Hand-held shower   Bathroom Accessibility Accessible   Home Equipment   (none)   Additional Comments Pt ambulating independently without AD PTA   Prior Function   Level of [place] Independent with ADLs;Independent with functional mobility;Independent with IADLS   Lives With Family  (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)   Receives Help From Family   IADLs Independent with driving;Independent with meal prep;Independent with medication management   Falls in the last 6 months 0   Vocational Retired   Subjective   Subjective \"I feel much better.\"   ADL   Where Assessed Edge of bed   Eating Assistance 7  Independent   Grooming Assistance 7  Independent   UB Bathing Assistance 7  Independent   LB Bathing Assistance 7  Independent   UB Dressing Assistance 7  Independent   LB Dressing Assistance 7  Independent   Toileting Assistance  7  Independent   Bed Mobility   Rolling R 7  Independent   Rolling L 7  Independent   Supine to Sit 7  Independent   Sit to Supine 7  Independent   Transfers   Sit to Stand 7  Independent   Stand to Sit 7  Independent   Stand pivot 7  Independent   Functional Mobility   Functional Mobility 7  Independent   Additional Comments 100 feet   Balance   Static Sitting Normal   Dynamic Sitting Normal   Static Standing Good   Dynamic Standing Good   Activity Tolerance   Activity Tolerance Patient tolerated treatment well   Nurse Made Aware FRANCISCO Barraza   RUE Assessment   RUE Assessment " WFL   LUE Assessment   LUE Assessment WFL   Hand Function   Gross Motor Coordination Functional   Fine Motor Coordination Functional   Psychosocial   Psychosocial (WDL) WDL   Cognition   Overall Cognitive Status WFL   Assessment   Prognosis Good   Assessment Patient evaluated by Occupational Therapy.  Patient admitted with Severe sepsis (HCC).  The patients occupational profile, medical and therapy history includes a brief history with review of medical and/or therapy records related to the presenting problem.  Comorbidities affecting functional mobility and ADLS include: DMT2, pacemaker, HTN, obesity, asthma .  Prior to admission, patient was independent with functional mobility without assistive device, independent with ADLS, independent with IADLS, living with mother in a 1 level home with 3 steps to enter, ambulating household distance, ambulating community distances, and retired.  The evaluation identifies the following performance deficits: decreased endurance, that result in activity limitations and/or participation restrictions. This evaluation requires clinical decision making of low complexity, because the patients presents with no comorbidities that affect occupational performance and required no modification of tasks or assistance with consideration of a limited number of treatment options.  The Barthel Index was used as a functional outcome tool presenting with a score of Barthel Index Score: 100, indicating no limitations of functional mobility and ADLS.  The patient's raw score on the -PAC Daily Activity Inpatient Short Form is 24. A raw score of greater than or equal to 19 suggests the patient may benefit from discharge to home.  Patient is not in need of skilled Occupational Therapy services at this time.   Goals   Patient Goals n/a   Plan   Goal Expiration Date 09/06/24   OT Frequency Eval only   Discharge Recommendation   Rehab Resource Intensity Level, OT No post-acute rehabilitation needs    AM-PAC Daily Activity Inpatient   Lower Body Dressing 4   Bathing 4   Toileting 4   Upper Body Dressing 4   Grooming 4   Eating 4   Daily Activity Raw Score 24   Daily Activity Standardized Score (Calc for Raw Score >=11) 57.54   AM-PAC Applied Cognition Inpatient   Following a Speech/Presentation 4   Understanding Ordinary Conversation 4   Taking Medications 4   Remembering Where Things Are Placed or Put Away 4   Remembering List of 4-5 Errands 4   Taking Care of Complicated Tasks 4   Applied Cognition Raw Score 24   Applied Cognition Standardized Score 62.21   Barthel Index   Feeding 10   Bathing 5   Grooming Score 5   Dressing Score 10   Bladder Score 10   Bowels Score 10   Toilet Use Score 10   Transfers (Bed/Chair) Score 15   Mobility (Level Surface) Score 15   Stairs Score 10   Barthel Index Score 100   End of Consult   Patient Position at End of Consult Supine;All needs within reach   Nurse Communication Nurse aware of consult   Licensure   [place] License Number  Vanda Sandra MS, OTR/L, [place] Lic# [id number]

## 2024-09-06 NOTE — PHYSICAL THERAPY NOTE
PHYSICAL THERAPY     09/06/24 1502   Note Type   Note type Screen   Additional Comments no skilled PT needs as per OT evaluation, patient is transfering, ambulating and completing all ADLs independently. As per nursing staff, patient is ambulating independently to and from bathroom as well.  DC PT   Discharge Recommendation   Rehab Resource Intensity Level, PT No post-acute rehabilitation needs   Licensure   [place] License Number  Soha Mcintosh PT [id number]

## 2024-09-06 NOTE — QUICK NOTE
Case was discussed with ICU attending given fluctuating BP with concern for septic shock     ICU attending recommended giving albumin 250 ml x 1   Also to continue IVF at current rate  No need for ICU upgrade now   ICU will follow   D/c midodrine

## 2024-09-06 NOTE — ASSESSMENT & PLAN NOTE
Lab Results   Component Value Date    HGBA1C 7.0 (H) 09/05/2024       Recent Labs     09/05/24 2049 09/06/24  0710 09/06/24  1121   POCGLU 197* 150* 161*       Blood Sugar Average: Last 72 hrs:  (P) 169.7992162218104621    SSI with accu checks

## 2024-09-06 NOTE — CASE MANAGEMENT
Case Management Assessment & Discharge Planning Note    Patient name Anna Ly  Location [facility] [address] [facility]/[address] A MRN [id number]  : 1955 Date 2024       Current Admission Date: 2024  Current Admission Diagnosis:Severe sepsis (HCC)   Patient Active Problem List    Diagnosis Date Noted Date Diagnosed    Lobar pneumonia (HCC) 2024     Hypotension 2024     Severe sepsis (HCC) 2024     Asthma exacerbation 2024     History of left knee replacement 2019     Controlled type 2 diabetes mellitus without complication, without long-term current use of insulin (HCC) 2019     Primary osteoarthritis of right knee 2019     Gastroesophageal reflux disease without esophagitis 2019     Primary hypertension 2019     Other hyperlipidemia 2019       LOS (days): 0  Geometric Mean LOS (GMLOS) (days):   Days to GMLOS:     OBJECTIVE:         Current admission status: Observation    Preferred Pharmacy:   [facility] [address] [place] [address] [address]  [address]  [facility] [address]  Phone: [phone] Fax: [phone]    Primary Care Provider: Delbert Duarte MD    Primary Insurance: [employer]  Secondary Insurance:     ASSESSMENT:  Active Health Care Proxies    There are no active Health Care Proxies on file.       Obs Notice Signed: 24    Readmission Root Cause  30 Day Readmission: No    Patient Information  Admitted from:: Home  Mental Status: Alert  During Assessment patient was accompanied by: Not accompanied during assessment  Assessment information provided by:: Patient  Primary Caregiver: Self  Support Systems: Son, Daughter, Self  County of Residence: [place]  What city do you live in?: [place]  Home entry access options. Select all that apply.: Stairs  Number of steps to enter home.: 3  Type of Current Residence: [place]  Living Arrangements: Other (Comment)    Activities of Daily Living Prior to Admission  Functional  Status: Independent  Completes ADLs independently?: Yes  Ambulates independently?: Yes  Does patient use assisted devices?: Yes  Assisted Devices (DME) used: Other (Comment) (Inhaler for asthma)  Does patient currently own DME?: Yes  What DME does the patient currently own?: Other (Comment) (Inhaler for asthma)  Does patient have a history of Outpatient Therapy (PT/OT)?: No  Does the patient have a history of Short-Term Rehab?: Yes (Hx post knee surgery in 2008)  Does patient have a history of HHC?: Yes (Hx post knee surgery in 2008)  Does patient currently have HHC?: No    Patient Information Continued  Income Source: Pension/FCI  Does patient have prescription coverage?: Yes  Does patient receive dialysis treatments?: No      Means of Transportation  Means of Transport to Appts:: Drives Self      Social Determinants of Health (SDOH)      Flowsheet Row Most Recent Value   Housing Stability    In the last 12 months, was there a time when you were not able to pay the mortgage or rent on time? N   In the past 12 months, how many times have you moved where you were living? 0   At any time in the past 12 months, were you homeless or living in a shelter (including now)? N   Transportation Needs    In the past 12 months, has lack of transportation kept you from medical appointments or from getting medications? no   In the past 12 months, has lack of transportation kept you from meetings, work, or from getting things needed for daily living? No   Food Insecurity    Within the past 12 months, you worried that your food would run out before you got the money to buy more. Never true   Within the past 12 months, the food you bought just didn't last and you didn't have money to get more. Never true   Utilities    In the past 12 months has the electric, gas, oil, or water company threatened to shut off services in your home? No            DISCHARGE DETAILS:    Discharge planning discussed with:: Patient  Freedom of  Choice: Yes    CM contacted family/caregiver?: Yes    Contacts  Patient Contacts: Robert Hernandez  Relationship to Patient:: Family  Contact Method: Phone  Phone Number: [phone]  Reason/Outcome: Continuity of Care, Emergency Contact, Discharge Planning    Requested Home Health Care         Is the patient interested in HHC at discharge?: No    DME Referral Provided  Referral made for DME?: No      Treatment Team Recommendation: Home  Discharge Destination Plan:: Home      CM met with patient bedside to introduce self/role, complete assessment and discuss discharge planning. Patient is fully independent in all daily activities, drives self, only DME used is inhaler for asthma. Patient had no questions or concerns at this time. CM will continue to follow for potential discharge needs.     Attending requested release of records from patient's Cardiologist Dr. Melanie Zaragoza. CM made unit clerk aware of request. Attending placing order for records request.

## 2024-09-07 PROBLEM — I50.42 CHRONIC COMBINED SYSTOLIC AND DIASTOLIC CHF (CONGESTIVE HEART FAILURE) (HCC): Status: ACTIVE | Noted: 2024-09-07

## 2024-09-07 PROBLEM — R00.0 TACHYCARDIA: Status: ACTIVE | Noted: 2024-09-07

## 2024-09-07 LAB
ANION GAP SERPL CALCULATED.3IONS-SCNC: 8 MMOL/L (ref 4–13)
BASOPHILS # BLD AUTO: 0.04 THOUSANDS/ÂΜL (ref 0–0.1)
BASOPHILS NFR BLD AUTO: 0 % (ref 0–1)
BUN SERPL-MCNC: 17 MG/DL (ref 5–25)
CALCIUM SERPL-MCNC: 8.9 MG/DL (ref 8.4–10.2)
CHLORIDE SERPL-SCNC: 104 MMOL/L (ref 96–108)
CO2 SERPL-SCNC: 26 MMOL/L (ref 21–32)
CREAT SERPL-MCNC: 1.17 MG/DL (ref 0.6–1.3)
EOSINOPHIL # BLD AUTO: 0.31 THOUSAND/ÂΜL (ref 0–0.61)
EOSINOPHIL NFR BLD AUTO: 3 % (ref 0–6)
ERYTHROCYTE [DISTWIDTH] IN BLOOD BY AUTOMATED COUNT: 13.3 % (ref 11.6–15.1)
GFR SERPL CREATININE-BSD FRML MDRD: 48 ML/MIN/1.73SQ M
GLUCOSE SERPL-MCNC: 151 MG/DL (ref 65–140)
GLUCOSE SERPL-MCNC: 156 MG/DL (ref 65–140)
GLUCOSE SERPL-MCNC: 182 MG/DL (ref 65–140)
GLUCOSE SERPL-MCNC: 210 MG/DL (ref 65–140)
HCT VFR BLD AUTO: 40.8 % (ref 34.8–46.1)
HGB BLD-MCNC: 13 G/DL (ref 11.5–15.4)
IMM GRANULOCYTES # BLD AUTO: 0.06 THOUSAND/UL (ref 0–0.2)
IMM GRANULOCYTES NFR BLD AUTO: 1 % (ref 0–2)
LYMPHOCYTES # BLD AUTO: 1.93 THOUSANDS/ÂΜL (ref 0.6–4.47)
LYMPHOCYTES NFR BLD AUTO: 16 % (ref 14–44)
MAGNESIUM SERPL-MCNC: 1.7 MG/DL (ref 1.9–2.7)
MCH RBC QN AUTO: 30.2 PG (ref 26.8–34.3)
MCHC RBC AUTO-ENTMCNC: 31.9 G/DL (ref 31.4–37.4)
MCV RBC AUTO: 95 FL (ref 82–98)
MONOCYTES # BLD AUTO: 1.09 THOUSAND/ÂΜL (ref 0.17–1.22)
MONOCYTES NFR BLD AUTO: 9 % (ref 4–12)
NEUTROPHILS # BLD AUTO: 8.45 THOUSANDS/ÂΜL (ref 1.85–7.62)
NEUTS SEG NFR BLD AUTO: 71 % (ref 43–75)
NRBC BLD AUTO-RTO: 0 /100 WBCS
PLATELET # BLD AUTO: 320 THOUSANDS/UL (ref 149–390)
PMV BLD AUTO: 9.2 FL (ref 8.9–12.7)
POTASSIUM SERPL-SCNC: 4.1 MMOL/L (ref 3.5–5.3)
PROCALCITONIN SERPL-MCNC: 0.16 NG/ML
RBC # BLD AUTO: 4.31 MILLION/UL (ref 3.81–5.12)
SODIUM SERPL-SCNC: 138 MMOL/L (ref 135–147)
WBC # BLD AUTO: 11.88 THOUSAND/UL (ref 4.31–10.16)

## 2024-09-07 PROCEDURE — 80048 BASIC METABOLIC PNL TOTAL CA: CPT

## 2024-09-07 PROCEDURE — 94760 N-INVAS EAR/PLS OXIMETRY 1: CPT

## 2024-09-07 PROCEDURE — 94640 AIRWAY INHALATION TREATMENT: CPT

## 2024-09-07 PROCEDURE — 99232 SBSQ HOSP IP/OBS MODERATE 35: CPT

## 2024-09-07 PROCEDURE — 84145 PROCALCITONIN (PCT): CPT

## 2024-09-07 PROCEDURE — 83735 ASSAY OF MAGNESIUM: CPT

## 2024-09-07 PROCEDURE — 82948 REAGENT STRIP/BLOOD GLUCOSE: CPT

## 2024-09-07 PROCEDURE — 85025 COMPLETE CBC W/AUTO DIFF WBC: CPT

## 2024-09-07 RX ORDER — MAGNESIUM SULFATE HEPTAHYDRATE 40 MG/ML
2 INJECTION, SOLUTION INTRAVENOUS ONCE
Status: COMPLETED | OUTPATIENT
Start: 2024-09-07 | End: 2024-09-07

## 2024-09-07 RX ORDER — SODIUM CHLORIDE 9 MG/ML
100 INJECTION, SOLUTION INTRAVENOUS CONTINUOUS
Status: DISCONTINUED | OUTPATIENT
Start: 2024-09-07 | End: 2024-09-08

## 2024-09-07 RX ORDER — SODIUM CHLORIDE 9 MG/ML
100 INJECTION, SOLUTION INTRAVENOUS CONTINUOUS
Status: DISCONTINUED | OUTPATIENT
Start: 2024-09-07 | End: 2024-09-07

## 2024-09-07 RX ORDER — ALBUMIN, HUMAN INJ 5% 5 %
12.5 SOLUTION INTRAVENOUS ONCE
Status: COMPLETED | OUTPATIENT
Start: 2024-09-07 | End: 2024-09-07

## 2024-09-07 RX ADMIN — DULOXETINE HYDROCHLORIDE 30 MG: 30 CAPSULE, DELAYED RELEASE ORAL at 08:00

## 2024-09-07 RX ADMIN — ALBUMIN (HUMAN) 12.5 G: 12.5 INJECTION, SOLUTION INTRAVENOUS at 08:09

## 2024-09-07 RX ADMIN — HEPARIN SODIUM 5000 UNITS: 5000 INJECTION, SOLUTION INTRAVENOUS; SUBCUTANEOUS at 15:06

## 2024-09-07 RX ADMIN — GUAIFENESIN 600 MG: 600 TABLET, EXTENDED RELEASE ORAL at 08:00

## 2024-09-07 RX ADMIN — HEPARIN SODIUM 5000 UNITS: 5000 INJECTION, SOLUTION INTRAVENOUS; SUBCUTANEOUS at 05:53

## 2024-09-07 RX ADMIN — MAGNESIUM SULFATE HEPTAHYDRATE 2 G: 40 INJECTION, SOLUTION INTRAVENOUS at 09:02

## 2024-09-07 RX ADMIN — DOXYCYCLINE 100 MG: 100 CAPSULE ORAL at 08:00

## 2024-09-07 RX ADMIN — IPRATROPIUM BROMIDE AND ALBUTEROL SULFATE 3 ML: .5; 3 SOLUTION RESPIRATORY (INHALATION) at 20:13

## 2024-09-07 RX ADMIN — HEPARIN SODIUM 5000 UNITS: 5000 INJECTION, SOLUTION INTRAVENOUS; SUBCUTANEOUS at 21:19

## 2024-09-07 RX ADMIN — SODIUM CHLORIDE 100 ML/HR: 0.9 INJECTION, SOLUTION INTRAVENOUS at 15:04

## 2024-09-07 RX ADMIN — PANTOPRAZOLE SODIUM 40 MG: 40 TABLET, DELAYED RELEASE ORAL at 05:53

## 2024-09-07 RX ADMIN — GABAPENTIN 300 MG: 300 CAPSULE ORAL at 08:00

## 2024-09-07 RX ADMIN — PRAVASTATIN SODIUM 40 MG: 40 TABLET ORAL at 17:02

## 2024-09-07 RX ADMIN — DOXYCYCLINE 100 MG: 100 CAPSULE ORAL at 21:19

## 2024-09-07 RX ADMIN — METOPROLOL TARTRATE 25 MG: 25 TABLET, FILM COATED ORAL at 15:15

## 2024-09-07 RX ADMIN — IPRATROPIUM BROMIDE AND ALBUTEROL SULFATE 3 ML: .5; 3 SOLUTION RESPIRATORY (INHALATION) at 13:31

## 2024-09-07 RX ADMIN — INSULIN LISPRO 1 UNITS: 100 INJECTION, SOLUTION INTRAVENOUS; SUBCUTANEOUS at 11:38

## 2024-09-07 RX ADMIN — CEFTRIAXONE 1000 MG: 1 INJECTION, SOLUTION INTRAVENOUS at 15:06

## 2024-09-07 RX ADMIN — IPRATROPIUM BROMIDE AND ALBUTEROL SULFATE 3 ML: .5; 3 SOLUTION RESPIRATORY (INHALATION) at 07:10

## 2024-09-07 RX ADMIN — ASPIRIN 81 MG CHEWABLE TABLET 81 MG: 81 TABLET CHEWABLE at 08:00

## 2024-09-07 RX ADMIN — GUAIFENESIN 600 MG: 600 TABLET, EXTENDED RELEASE ORAL at 21:19

## 2024-09-07 RX ADMIN — IPRATROPIUM BROMIDE AND ALBUTEROL SULFATE 3 ML: .5; 3 SOLUTION RESPIRATORY (INHALATION) at 02:08

## 2024-09-07 RX ADMIN — SODIUM CHLORIDE 100 ML/HR: 0.9 INJECTION, SOLUTION INTRAVENOUS at 08:00

## 2024-09-07 RX ADMIN — GABAPENTIN 300 MG: 300 CAPSULE ORAL at 17:02

## 2024-09-07 RX ADMIN — FLUTICASONE FUROATE 1 PUFF: 200 POWDER RESPIRATORY (INHALATION) at 08:01

## 2024-09-07 RX ADMIN — INSULIN LISPRO 2 UNITS: 100 INJECTION, SOLUTION INTRAVENOUS; SUBCUTANEOUS at 16:02

## 2024-09-07 RX ADMIN — INSULIN LISPRO 1 UNITS: 100 INJECTION, SOLUTION INTRAVENOUS; SUBCUTANEOUS at 07:59

## 2024-09-07 NOTE — PLAN OF CARE
Problem: PAIN - ADULT  Goal: Verbalizes/displays adequate comfort level or baseline comfort level  Description: Interventions:  - Encourage patient to monitor pain and request assistance  - Assess pain using appropriate pain scale  - Administer analgesics based on type and severity of pain and evaluate response  - Implement non-pharmacological measures as appropriate and evaluate response  - Consider cultural and social influences on pain and pain management  - Notify physician/advanced practitioner if interventions unsuccessful or patient reports new pain  Outcome: Progressing     Problem: INFECTION - ADULT  Goal: Absence or prevention of progression during hospitalization  Description: INTERVENTIONS:  - Assess and monitor for signs and symptoms of infection  - Monitor lab/diagnostic results  - Monitor all insertion sites, i.e. indwelling lines, tubes, and drains  - Monitor endotracheal if appropriate and nasal secretions for changes in amount and color  - [place] appropriate cooling/warming therapies per order  - Administer medications as ordered  - Instruct and encourage patient and family to use good hand hygiene technique  - Identify and instruct in appropriate isolation precautions for identified infection/condition  Outcome: Progressing  Goal: Absence of fever/infection during neutropenic period  Description: INTERVENTIONS:  - Monitor WBC    Outcome: Progressing     Problem: SAFETY ADULT  Goal: Patient will remain free of falls  Description: INTERVENTIONS:  - Educate patient/family on patient safety including physical limitations  - Instruct patient to call for assistance with activity   - Consult OT/PT to assist with strengthening/mobility   - Keep Call bell within reach  - Keep bed low and locked with side rails adjusted as appropriate  - Keep care items and personal belongings within reach  - Initiate and maintain comfort rounds  - Make Fall Risk Sign visible to staff  - Offer Toileting every 2 Hours,  in advance of need  - Initiate/Maintain bed alarm  -  - Apply yellow socks and bracelet for high fall risk patients  - Consider moving patient to room near nurses station  Outcome: Progressing  Goal: Maintain or return to baseline ADL function  Description: INTERVENTIONS:  -  Assess patient's ability to carry out ADLs; assess patient's baseline for ADL function and identify physical deficits which impact ability to perform ADLs (bathing, care of mouth/teeth, toileting, grooming, dressing, etc.)  - Assess/evaluate cause of self-care deficits   - Assess range of motion  - Assess patient's mobility; develop plan if impaired  - Assess patient's need for assistive devices and provide as appropriate  - Encourage maximum independence but intervene and supervise when necessary  - Involve family in performance of ADLs  - Assess for home care needs following discharge   - Consider OT consult to assist with ADL evaluation and planning for discharge  - Provide patient education as appropriate  Outcome: Progressing  Goal: Maintains/Returns to pre admission functional level  Description: INTERVENTIONS:  - Perform AM-PAC 6 Click Basic Mobility/ Daily Activity assessment daily.  - Set and communicate daily mobility goal to care team and patient/family/caregiver.   - Collaborate with rehabilitation services on mobility goals if consulted    - Out of bed for toileting  - Record patient progress and toleration of activity level   Outcome: Progressing

## 2024-09-07 NOTE — PROGRESS NOTES
formerly [facility]  Progress Note  Name: Anna Ly I  MRN: [id number]  Unit/Bed#: 2 [facility] 219 A  Date of Admission: 9/5/2024   Date of Service: 9/7/2024 I Hospital Day: 1    Assessment & Plan   Tachycardia  Assessment & Plan  Ventricular paced rhythm .   110-120   Continue metoprolol  Continue IVF for sepsis   Abx for pneumonia   Monitor on Tele     Chronic combined systolic and diastolic CHF (congestive heart failure) (HCC)  Assessment & Plan  Wt Readings from Last 3 Encounters:   09/07/24 90.3 kg (199 lb)   09/28/23 91.6 kg (202 lb)   08/23/22 97.5 kg (215 lb)   EF 30 % per caridologist.   Patient reports baseline EF 30-38 %   ICD , paced rhythm     Home medicaitons:   Takes metoprolol  Lasix     Cardiologist: Sadi Liang.   Medical records requested on 9/6/24       Recheck Echo with no acute change. However its difficult to tell given no cardiology records available at this time for comparison   Pending outside medical records retreival   Patient doesn't seem volume overloaded. Trace edema only . Vitally stable.   Continue to check daily weight and I/Os         Hypotension  Assessment & Plan  Continue to hold Lasix   Metoprolol with hold parameters     -s/p IVF NS bolus 250 + albumin 250 with good response. MAP at 65 +    given history of CHF no aggressive fluid resuscitation was given   Case was discussed with ICU team and they agreed with the plan above on 9/6     -continue IVF @ 100 ml/hr  -administer albumin 250 x 1    -monitor vitals Q 4     Lobar pneumonia (HCC)  Assessment & Plan  Refer to A and P for sepsis     Asthma exacerbation  Assessment & Plan  Due to pneumonia       Continue PTA inhalers   Respiratory protocol   Duo nebs   Interval follow up     Other hyperlipidemia  Assessment & Plan  Continue statins     Primary hypertension  Assessment & Plan  Her medications includes metoprolol XR 50 mg a.m., 25 mg PM.  Currently on hold.  Patient transition to metoprolol 25 mg  every 8.  Hold parameters discussed with nursing    Controlled type 2 diabetes mellitus without complication, without long-term current use of insulin (HCC)  Assessment & Plan  Lab Results   Component Value Date    HGBA1C 7.0 (H) 09/05/2024       Recent Labs     09/05/24  2049 09/06/24  0710 09/06/24  1121 09/06/24  1621   POCGLU 197* 150* 161* 177*         Blood Sugar Average: Last 72 hrs:  (P) 171.25    SSI with accu checks       * Severe sepsis (HCC)  Assessment & Plan  On presentation with tachycardia / leukocytosis with pneumonia as source of infection.   No IVF fluids given in ED   No LA checked in ED     Procal trending down from 0.39 to 0.16   legionella/strep urine studies negative   LA: 2.1 > 1.9   S/p  ml x 1 , Albumin 250 x 1 with good BP response   On low rate IVF @ 75 ml/hr     Weight-based IV fluids were not given secondary to concern for heart failure given the patient have systolic heart failure with no echo at baseline on chart.    CT chest: Mild patchy bilateral consolidation and groundglass opacity, greatest in the right lower lobe, compatible with pneumonia.     Continue ceftriaxone and doxycycline   continue NS IVF @ 100 ml/hr   Procal normalized today   Monitor vitals   Respiratory protocol   Duonebs   PTA inhaler   Am labs                  VTE Pharmacologic Prophylaxis:   Moderate Risk (Score 3-4) - Pharmacological DVT Prophylaxis Ordered: heparin.    Mobility:   Basic Mobility Inpatient Raw Score: 24  JH-HLM Goal: 8: Walk 250 feet or more  JH-HLM Achieved: 7: Walk 25 feet or more  JH-HLM Goal achieved. Continue to encourage appropriate mobility.    Patient Centered Rounds: I performed bedside rounds with nursing staff today.   Discussions with Specialists or Other Care Team Provider: PTOT CM     Education and Discussions with Family / Patient:  patient .     Total Time Spent on Date of Encounter in care of patient: 30 mins. This time was spent on one or more of the following:  performing physical exam; counseling and coordination of care; obtaining or reviewing history; documenting in the medical record; reviewing/ordering tests, medications or procedures; communicating with other healthcare professionals and discussing with patient's family/caregivers.    Current Length of Stay: 1 day(s)  Current Patient Status: Inpatient   Certification Statement: The patient will continue to require additional inpatient hospital stay due to patient   Discharge Plan: Anticipate discharge in 48 hrs to home.    Code Status: Level 1 - Full Code    Subjective:   Patient was seen today at bedside. Reports no active complaints.   No chest pain or tightness, SOB or cough, dizziness or light headedness, N/V, Diarrhea of constipation.   No active urinary symptoms  Tolerating oral diet.       Objective:     Vitals:   Temp (24hrs), Av.3 °F (36.8 °C), Min:97.9 °F (36.6 °C), Max:98.6 °F (37 °C)    Temp:  [97.9 °F (36.6 °C)-98.6 °F (37 °C)] 98.6 °F (37 °C)  HR:  [] 117  Resp:  [18-19] 18  BP: ()/(45-72) 103/63  SpO2:  [93 %-100 %] 96 %  Body mass index is 33.12 kg/m².     Input and Output Summary (last 24 hours):     Intake/Output Summary (Last 24 hours) at 2024 1052  Last data filed at 2024 0809  Gross per 24 hour   Intake 510 ml   Output --   Net 510 ml       Physical Exam:   Physical Exam  Vitals and nursing note reviewed.   Constitutional:       General: She is not in acute distress.     Appearance: Normal appearance.   HENT:      Head: Normocephalic and atraumatic.      Mouth/Throat:      Mouth: Mucous membranes are moist.   Eyes:      Conjunctiva/sclera: Conjunctivae normal.      Pupils: Pupils are equal, round, and reactive to light.   Cardiovascular:      Rate and Rhythm: Normal rate and regular rhythm.      Pulses: Normal pulses.           Carotid pulses are 2+ on the right side and 2+ on the left side.       Radial pulses are 2+ on the right side and 2+ on the left side.         Dorsalis pedis pulses are 2+ on the right side and 2+ on the left side.      Heart sounds: Normal heart sounds, S1 normal and S2 normal. No murmur heard.     Comments: Trace pitting edema bilaterally   Pulmonary:      Effort: No tachypnea, bradypnea or accessory muscle usage.      Breath sounds: Normal air entry. Rhonchi present. No decreased breath sounds, wheezing or rales.   Abdominal:      General: Abdomen is flat. Bowel sounds are normal. There is no distension.      Palpations: Abdomen is soft.      Tenderness: There is no abdominal tenderness.   Neurological:      Mental Status: She is alert and oriented to person, place, and time. Mental status is at baseline.          Additional Data:     Labs:  Results from last 7 days   Lab Units 09/07/24  0601   WBC Thousand/uL 11.88*   HEMOGLOBIN g/dL 13.0   HEMATOCRIT % 40.8   PLATELETS Thousands/uL 320   SEGS PCT % 71   LYMPHO PCT % 16   MONO PCT % 9   EOS PCT % 3     Results from last 7 days   Lab Units 09/07/24  0601 09/06/24  0455   SODIUM mmol/L 138 134*   POTASSIUM mmol/L 4.1 3.8   CHLORIDE mmol/L 104 97   CO2 mmol/L 26 24   BUN mg/dL 17 22   CREATININE mg/dL 1.17 1.29   ANION GAP mmol/L 8 13   CALCIUM mg/dL 8.9 9.2   ALBUMIN g/dL  --  3.7   TOTAL BILIRUBIN mg/dL  --  0.94   ALK PHOS U/L  --  54   ALT U/L  --  12   AST U/L  --  12*   GLUCOSE RANDOM mg/dL 151* 140         Results from last 7 days   Lab Units 09/07/24  0705 09/06/24  2038 09/06/24  1621 09/06/24  1121 09/06/24  0710 09/05/24  2049   POC GLUCOSE mg/dl 156* 183* 177* 161* 150* 197*     Results from last 7 days   Lab Units 09/05/24  1539   HEMOGLOBIN A1C % 7.0*     Results from last 7 days   Lab Units 09/07/24  0601 09/06/24  0601 09/06/24  0455 09/06/24  0004 09/05/24  1539   LACTIC ACID mmol/L  --  1.9  --  2.1*  --    PROCALCITONIN ng/ml 0.16  --  0.35*  --  0.39*       Lines/Drains:  Invasive Devices       Peripheral Intravenous Line  Duration             Peripheral IV 09/05/24 Left Antecubital 1  day                      Telemetry:  Telemetry Orders (From admission, onward)               24 Hour Telemetry Monitoring  Continuous x 24 Hours (Telem)        Question:  Reason for 24 Hour Telemetry  Answer:  Arrhythmias requiring acute medical intervention / PPM or ICD malfunction                     Telemetry Reviewed: Sinus Tachycardia  Indication for Continued Telemetry Use: high risk , severe sepsis , CHF              Imaging: No pertinent imaging reviewed.    Recent Cultures (last 7 days):   Results from last 7 days   Lab Units 09/05/24  2205 09/05/24  1758   BLOOD CULTURE   --  No Growth at 24 hrs.  No Growth at 24 hrs.   LEGIONELLA URINARY ANTIGEN  Negative  --        Last 24 Hours Medication List:   Current Facility-Administered Medications   Medication Dose Route Frequency Provider Last Rate    aspirin  81 mg Oral Daily Melanie Kinney MD      cefTRIAXone  1,000 mg Intravenous Q24H Melanie Kinney MD 1,000 mg (09/06/24 1604)    doxycycline hyclate  100 mg Oral Q12H [facility] Melanie Kinney MD      DULoxetine  30 mg Oral Daily Melanie Kinney MD      fluticasone  1 puff Inhalation Daily Melanie Kinney MD      gabapentin  300 mg Oral BID Melanie Kinney MD      guaiFENesin  600 mg Oral Q12H [facility] Melanie Kinney MD      heparin (porcine)  5,000 Units Subcutaneous Q8H [facility] Melanie Kinney MD      insulin lispro  1-6 Units Subcutaneous TID  Melanie Kinney MD      ipratropium-albuterol  3 mL Nebulization Q6H Melanie Kinney MD      magnesium sulfate  2 g Intravenous Once Melanie Kinney MD 2 g (09/07/24 0902)    metoprolol tartrate  25 mg Oral Q8H Melanie Kinney MD      pantoprazole  40 mg Oral Early Morning Melanie Kinney MD      pravastatin  40 mg Oral Daily With Dinner Melanie Kinney MD      sodium chloride (PF)  3 mL Intravenous Q1H PRN Melanie Kinney MD      sodium chloride  100 mL/hr Intravenous Continuous Melanie Kinney  mL/hr (09/07/24 0800)        Today, Patient Was Seen By: Melanie Kinney  MD    **Please Note: This note may have been constructed using a voice recognition system.**

## 2024-09-07 NOTE — PLAN OF CARE
Problem: PAIN - ADULT  Goal: Verbalizes/displays adequate comfort level or baseline comfort level  Description: Interventions:  - Encourage patient to monitor pain and request assistance  - Assess pain using appropriate pain scale  - Administer analgesics based on type and severity of pain and evaluate response  - Implement non-pharmacological measures as appropriate and evaluate response  - Consider cultural and social influences on pain and pain management  - Notify physician/advanced practitioner if interventions unsuccessful or patient reports new pain  Outcome: Progressing     Problem: INFECTION - ADULT  Goal: Absence or prevention of progression during hospitalization  Description: INTERVENTIONS:  - Assess and monitor for signs and symptoms of infection  - Monitor lab/diagnostic results  - Monitor all insertion sites, i.e. indwelling lines, tubes, and drains  - Monitor endotracheal if appropriate and nasal secretions for changes in amount and color  - [place] appropriate cooling/warming therapies per order  - Administer medications as ordered  - Instruct and encourage patient and family to use good hand hygiene technique  - Identify and instruct in appropriate isolation precautions for identified infection/condition  Outcome: Progressing  Goal: Absence of fever/infection during neutropenic period  Description: INTERVENTIONS:  - Monitor WBC    Outcome: Progressing     Problem: SAFETY ADULT  Goal: Patient will remain free of falls  Description: INTERVENTIONS:  - Educate patient/family on patient safety including physical limitations  - Instruct patient to call for assistance with activity   - Consult OT/PT to assist with strengthening/mobility   - Keep Call bell within reach  - Keep bed low and locked with side rails adjusted as appropriate  - Keep care items and personal belongings within reach  - Initiate and maintain comfort rounds  - Make Fall Risk Sign visible to staff  - Offer Toileting every 2 Hours,  in advance of need  - Obtain necessary fall risk management equipment: Call bell  - Apply yellow socks and bracelet for high fall risk patients  - Consider moving patient to room near nurses station  Outcome: Progressing  Goal: Maintain or return to baseline ADL function  Description: INTERVENTIONS:  -  Assess patient's ability to carry out ADLs; assess patient's baseline for ADL function and identify physical deficits which impact ability to perform ADLs (bathing, care of mouth/teeth, toileting, grooming, dressing, etc.)  - Assess/evaluate cause of self-care deficits   - Assess range of motion  - Assess patient's mobility; develop plan if impaired  - Assess patient's need for assistive devices and provide as appropriate  - Encourage maximum independence but intervene and supervise when necessary  - Involve family in performance of ADLs  - Assess for home care needs following discharge   - Consider OT consult to assist with ADL evaluation and planning for discharge  - Provide patient education as appropriate  Outcome: Progressing  Goal: Maintains/Returns to pre admission functional level  Description: INTERVENTIONS:  - Perform AM-PAC 6 Click Basic Mobility/ Daily Activity assessment daily.  - Set and communicate daily mobility goal to care team and patient/family/caregiver.   - Collaborate with rehabilitation services on mobility goals if consulted  - Perform Range of Motion 2 times a day.  - Reposition patient every 2 hours.  - Dangle patient 2 times a day  - Stand patient 2 times a day  - Ambulate patient 2 times a day  - Out of bed to chair 2 times a day   - Out of bed for meals 2 times a day  - Out of bed for toileting  - Record patient progress and toleration of activity level   Outcome: Progressing     Problem: DISCHARGE PLANNING  Goal: Discharge to home or other facility with appropriate resources  Description: INTERVENTIONS:  - Identify barriers to discharge w/patient and caregiver  - Arrange for needed  discharge resources and transportation as appropriate  - Identify discharge learning needs (meds, wound care, etc.)  - Arrange for interpretive services to assist at discharge as needed  - Refer to Case Management Department for coordinating discharge planning if the patient needs post-hospital services based on physician/advanced practitioner order or complex needs related to functional status, cognitive ability, or social support system  Outcome: Progressing     Problem: Knowledge Deficit  Goal: Patient/family/caregiver demonstrates understanding of disease process, treatment plan, medications, and discharge instructions  Description: Complete learning assessment and assess knowledge base.  Interventions:  - Provide teaching at level of understanding  - Provide teaching via preferred learning methods  Outcome: Progressing     Problem: RESPIRATORY - ADULT  Goal: Achieves optimal ventilation and oxygenation  Description: INTERVENTIONS:  - Assess for changes in respiratory status  - Assess for changes in mentation and behavior  - Position to facilitate oxygenation and minimize respiratory effort  - Oxygen administered by appropriate delivery if ordered  - Initiate smoking cessation education as indicated  - Encourage broncho-pulmonary hygiene including cough, deep breathe, Incentive Spirometry  - Assess the need for suctioning and aspirate as needed  - Assess and instruct to report SOB or any respiratory difficulty  - Respiratory Therapy support as indicated  Outcome: Progressing

## 2024-09-07 NOTE — ASSESSMENT & PLAN NOTE
Wt Readings from Last 3 Encounters:   09/07/24 90.3 kg (199 lb)   09/28/23 91.6 kg (202 lb)   08/23/22 97.5 kg (215 lb)   EF 30 % per caridologist.   Patient reports baseline EF 30-38 %   ICD , paced rhythm     Home medicaitons:   Takes metoprolol  Lasix     Cardiologist: Sadi Liang.   Medical records requested on 9/6/24       Recheck Echo with no acute change. However its difficult to tell given no cardiology records available at this time for comparison   Pending outside medical records retreival   Patient doesn't seem volume overloaded. Trace edema only . Vitally stable.   Continue to check daily weight and I/Os

## 2024-09-07 NOTE — ASSESSMENT & PLAN NOTE
Ventricular paced rhythm .   110-120   Continue metoprolol  Continue IVF for sepsis   Abx for pneumonia   Monitor on Tele

## 2024-09-07 NOTE — UTILIZATION REVIEW
Continued Stay Review    SEE INITIAL REVIEW AT BOTTOM    Date: 9/7/24                          Current Patient Class: Inpatient    Current Level of Care: med surg  HPI:69 y.o. female initially admitted on 9/6/24     Assessment/Plan:   Date: 9/7/24  Day 3: Has surpassed a 2nd midnight with active treatments and services.  Dx: severe sepsis, 2nd pneumonia. IVABT in progress. Lungs with rhonchi, currently on RA. Hypotensive this am, IV Albumin, IVF given with good response. Continue to monitor respiratory status, O2 needs, VS, follow labs, cultures.       Vital Signs (last 3 days)       Date/Time Temp Pulse Resp BP MAP (mmHg) SpO2 O2 Device Patient Position - Orthostatic VS Pain    09/07/24 09:05:29 -- 117 -- 103/63 76 96 % -- -- --    09/07/24 07:30:27 98.6 °F (37 °C) 111 18 101/64 76 96 % None (Room air) Lying No Pain    09/07/24 0710 -- -- -- -- -- 97 % None (Room air) -- --    09/07/24 0210 -- -- -- -- -- 96 % -- -- --    09/06/24 23:17:44 97.9 °F (36.6 °C) 115 19 99/65 76 94 % -- -- --    09/06/24 2100 -- -- -- -- -- -- -- -- No Pain    09/06/24 2039 -- -- -- -- -- 94 % None (Room air) -- --    09/06/24 17:19:20 -- 68 -- 120/45 70 96 % -- -- --    09/06/24 1719 -- 47 -- 120/45 70 94 % -- -- --    09/06/24 15:49:33 -- 65 -- 95/45 62 94 % -- -- --    09/06/24 15:49:13 -- 118 18 95/45 62 94 % -- -- --    09/06/24 14:03:23 -- 120 -- 119/72 88 93 % -- -- --    09/06/24 1333 -- -- -- -- -- 100 % None (Room air) -- --    09/06/24 1322 -- -- -- -- -- 93 % None (Room air) -- --    09/06/24 1255 -- -- -- -- -- -- -- -- No Pain    09/06/24 12:29:36 -- 118 -- 122/72 89 95 % -- -- --    09/06/24 1203 -- 108 -- 90/60 -- -- -- -- --    09/06/24 0914 -- -- -- 90/60 -- -- -- -- --    09/06/24 09:05:13 98.2 °F (36.8 °C) 62 16 85/58 67 95 % None (Room air) Lying No Pain    09/06/24 0800 -- -- -- -- -- -- -- -- No Pain    09/06/24 0730 -- -- -- -- -- 100 % None (Room air) -- --    09/06/24 0718 -- -- -- -- -- 93 % None (Room air) --  --    09/06/24 04:17:24 -- 50 -- 123/43 70 96 % -- -- --    09/06/24 0228 -- -- -- -- -- -- None (Room air) -- --    09/06/24 0130 -- -- -- 108/68 -- -- -- -- --    09/05/24 2300 -- -- -- -- -- -- -- -- No Pain    09/05/24 2236 -- -- -- -- -- -- None (Room air) -- --    09/05/24 22:27:32 -- 114 18 118/72 87 95 % -- -- --    09/1955 97.3 °F (36.3 °C) 109 -- 99/46 64 96 % -- -- No Pain    09/05/24 19:48:13 -- 62 18 99/46 64 95 % -- -- --    09/05/24 1830 -- 112 21 123/61 86 98 % None (Room air) Lying --    09/05/24 1720 99.6 °F (37.6 °C) 93 18 115/56 -- 94 % None (Room air) Lying --    09/05/24 1700 -- 68 26 109/59 80 96 % None (Room air) -- --    09/05/24 1630 -- 74 22 102/61 73 -- -- -- --    09/05/24 1605 -- -- -- -- -- -- None (Room air) -- --    09/05/24 1514 -- -- 22 132/79 -- 97 % -- -- --    09/05/24 1511 97.3 °F (36.3 °C) 68 18 -- -- -- -- -- 6          Weight (last 2 days)       Date/Time Weight    09/07/24 0600 90.3 (199)    09/06/24 1203 90.3 (199)    09/06/24 0553 90.4 (199.2)    09/1955 89 (196.2)    09/05/24 1511 89.8 (198)              Pertinent Labs/Diagnostic Results:   Radiology:  CT chest w contrast   Final Interpretation by Sandhya Nix MD (09/05 2040)      Mild patchy bilateral consolidation and groundglass opacity, greatest in the right lower lobe, compatible with pneumonia.      No cavitation.      No pleural effusion/empyema.         Workstation performed: [id number]         X-ray chest 1 view portable   Final Interpretation by Harish Hollingsworth MD (09/05 1624)      No acute cardiopulmonary disease.            Workstation performed: [id number]           Cardiology:  Echo complete w/ contrast if indicated   Final Result by Yadira Esparza MD (09/06 1334)        Left Ventricle: Left ventricular cavity size is normal. Wall thickness    is mildly increased. There is mild concentric hypertrophy. The left    ventricular ejection fraction is %.  GLS is -2.6% systolic  function is    severely reduced. The LV apex and the adjoining inferoseptal and    anterolateral walls are severely hypokinetic.  The basal segments appear    to contract normally. Diastolic function is abnormal.     IVS: There is abnormal septal motion consistent with right ventricular    pacing. There is sigmoid appearance of the septum.     Right Ventricle: Systolic function is normal. Normal tricuspid annular    plane systolic excursion (TAPSE) > 1.7 cm.     Mitral Valve: There is moderate annular calcification. There is trace    regurgitation.     Tricuspid Valve: There is mild regurgitation. The right ventricular    systolic pressure is mildly elevated at 46 mmHg.     Pulmonic Valve: There is trace regurgitation.         ECG 12 lead   Final Result by Alfredo Gee MD (09/05 6479)   Atrial-sensed ventricular-paced rhythm   Abnormal ECG   No previous ECGs available   Confirmed by Alfredo Gee ([id number]) on 9/5/2024 11:09:43 PM        Results from last 7 days   Lab Units 09/05/24  1539   SARS-COV-2  Negative     Results from last 7 days   Lab Units 09/07/24  0601 09/06/24  0455 09/05/24  1539   WBC Thousand/uL 11.88* 16.30* 17.76*   HEMOGLOBIN g/dL 13.0 15.0 15.9*   HEMATOCRIT % 40.8 46.1 47.8*   PLATELETS Thousands/uL 320 369 424*   TOTAL NEUT ABS Thousands/µL 8.45* 12.86* 15.10*     Results from last 7 days   Lab Units 09/07/24  0601 09/06/24  0455 09/05/24  1539   SODIUM mmol/L 138 134* 132*   POTASSIUM mmol/L 4.1 3.8 4.1   CHLORIDE mmol/L 104 97 96   CO2 mmol/L 26 24 23   ANION GAP mmol/L 8 13 13   BUN mg/dL 17 22 22   CREATININE mg/dL 1.17 1.29 1.25   EGFR ml/min/1.73sq m 48 42 44   CALCIUM mg/dL 8.9 9.2 9.2   MAGNESIUM mg/dL 1.7* 1.9 1.3*   PHOSPHORUS mg/dL  --  2.9  --      Results from last 7 days   Lab Units 09/06/24 0455 09/05/24  1539   AST U/L 12* 14   ALT U/L 12 15   ALK PHOS U/L 54 56   TOTAL PROTEIN g/dL 6.6 6.9   ALBUMIN g/dL 3.7 4.1   TOTAL BILIRUBIN mg/dL 0.94 1.16*     Results from last 7 days    Lab Units 09/07/24  1052 09/07/24  0705 09/06/24  2038 09/06/24  1621 09/06/24  1121 09/06/24  0710 09/05/24  2049   POC GLUCOSE mg/dl 182* 156* 183* 177* 161* 150* 197*     Results from last 7 days   Lab Units 09/07/24  0601 09/06/24  0455 09/05/24  1539   GLUCOSE RANDOM mg/dL 151* 140 210*         Results from last 7 days   Lab Units 09/05/24  1539   HEMOGLOBIN A1C % 7.0*   EAG mg/dl 154     Results from last 7 days   Lab Units 09/06/24  0004 09/05/24  1818 09/05/24  1539   HS TNI 0HR ng/L  --   --  9   HS TNI 2HR ng/L  --  12  --    HSTNI D2 ng/L  --  3  --    HS TNI 4HR ng/L 21  --   --    HSTNI D4 ng/L 12  --   --          Results from last 7 days   Lab Units 09/07/24  0601 09/06/24  0455 09/05/24  1539   PROCALCITONIN ng/ml 0.16 0.35* 0.39*     Results from last 7 days   Lab Units 09/06/24  0601 09/06/24  0004   LACTIC ACID mmol/L 1.9 2.1*     Results from last 7 days   Lab Units 09/05/24  1539   BNP pg/mL 330*         Results from last 7 days   Lab Units 09/06/24  1232   CLARITY UA  Clear   COLOR UA  Light Yellow   SPEC GRAV UA  <1.005*   PH UA  5.5   GLUCOSE UA mg/dl 1000 (1%)*   KETONES UA mg/dl Trace*   BLOOD UA  Trace*   PROTEIN UA mg/dl Negative   NITRITE UA  Negative   BILIRUBIN UA  Negative   UROBILINOGEN UA (BE) mg/dl <2.0   LEUKOCYTES UA  Negative   WBC UA /hpf 2-4   RBC UA /hpf 0-1   BACTERIA UA /hpf Occasional   EPITHELIAL CELLS WET PREP /hpf Occasional     Results from last 7 days   Lab Units 09/05/24  2205 09/05/24  1539   STREP PNEUMONIAE ANTIGEN, URINE  Negative  --    LEGIONELLA URINARY ANTIGEN  Negative  --    INFLUENZA A PCR   --  Negative   INFLUENZA B PCR   --  Negative   RSV PCR   --  Negative     Results from last 7 days   Lab Units 09/05/24  1758   BLOOD CULTURE  No Growth at 24 hrs.  No Growth at 24 hrs.       Medications:   Scheduled Medications:  Medications 08/29 08/30 08/31 09/01 09/02 09/03 09/04 09/05 09/06 09/07   acetaminophen (Ofirmev) injection 1,000 mg  Dose: 1,000  mg  Freq: Once Route: IV  Last Dose: Stopped (09/05/24 1657)  Start: 09/05/24 1530 End: 09/05/24 1657   Admin Instructions:              1546     1657          albumin human (FLEXBUMIN) 5 % injection 12.5 g  Dose: 12.5 g  Freq: Once Route: IV  Start: 09/07/24 0800 End: 09/07/24 0809   Admin Instructions:                0809        albumin human (FLEXBUMIN) 5 % injection 12.5 g  Dose: 12.5 g  Freq: Once Route: IV  Start: 09/06/24 1630 End: 09/06/24 1749   Admin Instructions:               1749         albuterol inhalation solution 5 mg  Dose: 5 mg  Freq: Once Route: NEBULIZATION  Start: 09/05/24 1530 End: 09/05/24 1548           1548          aspirin chewable tablet 81 mg  Dose: 81 mg  Freq: Daily Route: PO  Start: 09/06/24 0900            0908      0800        cefTRIAXone (ROCEPHIN) IVPB (premix in dextrose) 1,000 mg 50 mL  Dose: 1,000 mg  Freq: Every 24 hours Route: IV  Last Dose: 1,000 mg (09/06/24 1604)  Start: 09/06/24 1545   Admin Instructions:      Order specific questions:               1604      1545        cefTRIAXone (ROCEPHIN) IVPB (premix in dextrose) 1,000 mg 50 mL  Dose: 1,000 mg  Freq: Once Route: IV  Last Dose: Stopped (09/05/24 1912)  Start: 09/05/24 1745 End: 09/05/24 1912   Admin Instructions:      Order specific questions:              1842 1912          doxycycline hyclate (VIBRAMYCIN) capsule 100 mg  Dose: 100 mg  Freq: Every 12 hours scheduled Route: PO  Start: 09/05/24 1845   Admin Instructions:      Order specific questions:              1911 0908 2102 0800 2100        doxycycline hyclate (VIBRAMYCIN) capsule 100 mg  Dose: 100 mg  Freq: Every 12 hours scheduled Route: PO  Start: 09/05/24 2100 End: 09/05/24 1838   Admin Instructions:      Order specific questions:              1838-D/C'd        DULoxetine (CYMBALTA) delayed release capsule 30 mg  Dose: 30 mg  Freq: Daily Route: PO  Start: 09/06/24 0900   Admin Instructions:               0908      0800        fluticasone  (ARNUITY ELLIPTA) 200 MCG/ACT inhaler 1 puff  Dose: 1 puff  Freq: Daily Route: IN  Start: 09/05/24 1900   Admin Instructions:              2157 [C]      0923      0801        gabapentin (NEURONTIN) capsule 300 mg  Dose: 300 mg  Freq: 2 times daily Route: PO  Start: 09/05/24 2200   Admin Instructions:              2200      0909     1748      0800     1800        guaiFENesin (MUCINEX) 12 hr tablet 600 mg  Dose: 600 mg  Freq: Every 12 hours scheduled Route: PO  Start: 09/06/24 1030   Admin Instructions:               1135 [C]     2102      0800     2100        heparin (porcine) subcutaneous injection 5,000 Units  Dose: 5,000 Units  Freq: Every 8 hours scheduled Route: SC  Start: 09/05/24 2200   Admin Instructions:              2156      0502     1405     2102      0553     1400     2200         insulin lispro (HumALOG/ADMELOG) 100 units/mL subcutaneous injection 1-6 Units  Dose: 1-6 Units  Freq: 3 times daily before meals Route: SC  Start: 09/06/24 0700   Admin Instructions:               0746 [C]     1134     1601 [C]      0759     1130     1600        ipratropium (ATROVENT) 0.02 % inhalation solution 0.5 mg  Dose: 0.5 mg  Freq: Once Route: NEBULIZATION  Start: 09/05/24 1530 End: 09/05/24 1549           1549          ipratropium-albuterol (DUO-NEB) 0.5-2.5 mg/3 mL inhalation solution 3 mL  Dose: 3 mL  Freq: Every 6 hours (RESP) Route: NEBULIZATION  Start: 09/05/24 2000           2235      0228     0718     1321     2037      0208     0710     1400     2000        magnesium sulfate 2 g/50 mL IVPB (premix) 2 g  Dose: 2 g  Freq: Once Route: IV  Last Dose: 2 g (09/07/24 0902)  Start: 09/07/24 0715 End: 09/07/24 1102   Admin Instructions:                0902 [C]        magnesium sulfate 2 g/50 mL IVPB (premix) 2 g  Dose: 2 g  Freq: Once Route: IV  Last Dose: Stopped (09/05/24 1912)  Start: 09/05/24 1700 End: 09/05/24 1912   Admin Instructions:              1652 1912          metoprolol succinate (TOPROL-XL) 24 hr  tablet 25 mg  Dose: 25 mg  Freq: 2 times daily Route: PO  Start: 09/06/24 1345 End: 09/06/24 1525   Admin Instructions:      Order specific questions:               1408     1525-D/C'd       metoprolol succinate (TOPROL-XL) 24 hr tablet 25 mg  Dose: 25 mg  Freq: 2 times daily Route: PO  Start: 09/06/24 0900 End: 09/06/24 1335   Admin Instructions:      Order specific questions:               0924) 3055-D/C'd       metoprolol succinate (TOPROL-XL) 24 hr tablet 25 mg  Dose: 25 mg  Freq: Daily Route: PO  Start: 09/06/24 0130 End: 09/06/24 0652   Admin Instructions:      Order specific questions:               0130 0652-D/C'd       metoprolol succinate (TOPROL-XL) 24 hr tablet 50 mg  Dose: 50 mg  Freq: Daily Route: PO  Start: 09/06/24 0900 End: 09/06/24 0652   Admin Instructions:      Order specific questions:               0652-D/C'd       metoprolol succinate (TOPROL-XL) 24 hr tablet 50 mg  Dose: 50 mg  Freq: 2 times daily Route: PO  Start: 09/05/24 2200 End: 09/06/24 0124   Admin Instructions:      Order specific questions:              (9323) [C]      0124-D/C'd       metoprolol tartrate (LOPRESSOR) tablet 25 mg  Dose: 25 mg  Freq: Every 8 hours Route: PO  Start: 09/07/24 0000   Admin Instructions:      Order specific questions:               (9272) (2919) [C]     1600        metoprolol tartrate (LOPRESSOR) tablet 25 mg  Dose: 25 mg  Freq: Every 6 hours Route: PO  Start: 09/06/24 2000 End: 09/06/24 1738   Admin Instructions:      Order specific questions:               1738-D/C'd       metoprolol tartrate (LOPRESSOR) tablet 25 mg  Dose: 25 mg  Freq: Every 6 hours Route: PO  Start: 09/06/24 1530 End: 09/06/24 1527   Admin Instructions:      Order specific questions:               1527-D/C'd       midodrine (PROAMATINE) tablet 2.5 mg  Dose: 2.5 mg  Freq: 3 times daily before meals Route: PO  Start: 09/06/24 1600 End: 09/06/24 1607            1607-D/C'd  (1617)         midodrine (PROAMATINE) tablet 5  mg  Dose: 5 mg  Freq: 3 times daily before meals Route: PO  Start: 09/06/24 1615 End: 09/06/24 1628            1616     1628-D/C'd       pantoprazole (PROTONIX) EC tablet 40 mg  Dose: 40 mg  Freq: Daily (early morning) Route: PO  Start: 09/06/24 0600   Admin Instructions:               0502      0553        pravastatin (PRAVACHOL) tablet 40 mg  Dose: 40 mg  Freq: Daily with dinner Route: PO  Start: 09/06/24 1630            1616      1630        sodium chloride 0.9 % bolus 250 mL  Dose: 250 mL  Freq: Once Route: IV  Start: 09/06/24 0715 End: 09/06/24 0755            0755                     Continuous Meds Sorted by Name  for Anna Ly as of 08/29/24 through 9/7/24  Legend:       Medications 08/29 08/30 08/31 09/01 09/02 09/03 09/04 09/05 09/06 09/07   sodium chloride 0.9 % infusion  Rate: 100 mL/hr Dose: 100 mL/hr  Freq: Continuous Route: IV  Indications of Use: IV Resuscitation  Last Dose: 100 mL/hr (09/07/24 0800)  Start: 09/07/24 0700             0800 [C]        sodium chloride 0.9 % infusion  Rate: 100 mL/hr Dose: 100 mL/hr  Freq: Continuous Route: IV  Indications of Use: IV Resuscitation  Start: 09/07/24 0700 End: 09/07/24 0649             0649-D/C'd      sodium chloride 0.9 % infusion  Rate: 75 mL/hr Dose: 75 mL/hr  Freq: Continuous Route: IV  Indications of Use: IV Resuscitation  Last Dose: 75 mL/hr (09/06/24 1619)  Start: 09/06/24 1615 End: 09/07/24 0418            1619 [C]      0418-D/C'd      sodium chloride 0.9 % infusion  Rate: 75 mL/hr Dose: 75 mL/hr  Freq: Continuous Route: IV  Indications of Use: IV Resuscitation  Start: 09/06/24 1530 End: 09/06/24 1607            1607-D/C'd  (1617) [C]         sodium chloride 0.9 % infusion  Rate: 75 mL/hr Dose: 75 mL/hr  Freq: Continuous Route: IV  Indications of Use: IV Resuscitation  Last Dose: 75 mL/hr (09/06/24 1406)  Start: 09/06/24 0915 End: 09/06/24 1526            0919     1406     1526-D/C'd       Legend:        [id number]/3008/3109/0109/0209/0309/0409/0509/0609/07        PRN Meds Sorted by Name  for Anna Ly as of 08/29/24 through 9/7/24  Legend:       Medications 08/29 08/30 08/31 09/01 09/02 09/03 09/04 09/05 09/06 09/07   iohexol (OMNIPAQUE) 350 MG/ML injection (MULTI-DOSE) 85 mL  Dose: 85 mL  Freq: Once in imaging Route: IV  PRN Reason: contrast  Start: 09/05/24 1937 End: 09/05/24 1937 1937           sodium chloride (PF) 0.9 % injection 3 mL  Dose: 3 mL  Freq: Every 1 hour PRN Route: IV  PRN Reason: line care  Start: 09/05/24 1519                              Discharge Plan: tbd    Network Utilization Review Department  ATTENTION: Please call with any questions or concerns to [phone] and carefully listen to the prompts so that you are directed to the right person. All voicemails are confidential.   For Discharge needs, contact Care Management DC Support Team at [phone] opt. 2  Send all requests for admission clinical reviews, approved or denied determinations and any other requests to dedicated fax number below belonging to the campus where the patient is receiving treatment. List of dedicated fax numbers for the Facilities:  FACILITY NAME UR FAX NUMBER   ADMISSION DENIALS (Administrative/Medical Necessity) [phone]   DISCHARGE SUPPORT TEAM (NETWORK) [phone]   PARENT CHILD HEALTH (Maternity/NICU/Pediatrics) [phone]   [facility] [phone]   [facility] [phone]   [facility] [phone]   [facility] [phone]   [facility] [phone]   [facility] [phone]   [facility] [phone]   [facility] [phone]   [facility] [phone]   [facility] [phone]   [facility]  [facility] [phone]   [place] [phone]

## 2024-09-07 NOTE — ED PROVIDER NOTES
History  Chief Complaint   Patient presents with    Shortness of Breath     States she's been sick for about a week with cough and started vomiting yesterday and has hx of asthma     Patient is a 69-year-old female, past medical history clued diabetes, GERD, and asthma, who presents to the emergency room for shortness of breath and cough.  Patient states she had viral URI symptoms about a week ago.  She was seen by her doctor and started on steroids and Zithromax.  She reports no improvement and actually worsening of her symptoms.  No other modifying factors.  Did have fevers this morning.  No other associated symptoms.  No other complaints or concerns.      Shortness of Breath  Associated symptoms: fever        Prior to Admission Medications   Prescriptions Last Dose Informant Patient Reported? Taking?   DULoxetine (CYMBALTA) 60 mg delayed release capsule 9/3/2024  Yes No   JANUMET  MG per tablet 9/3/2024  Yes No   aspirin 81 mg chewable tablet 9/3/2024  Yes No   Sig: Chew 81 mg daily.   gabapentin (NEURONTIN) 300 mg capsule 9/3/2024  Yes No   losartan (COZAAR) 25 mg tablet 9/3/2024 Self Yes Yes   Sig: Take 25 mg by mouth daily   metoprolol succinate (TOPROL-XL) 100 mg 24 hr tablet   Yes No   metoprolol succinate (TOPROL-XL) 50 mg 24 hr tablet 9/3/2024  Yes No   Sig: Take 50 mg by mouth daily. 50 mg in am and 25 mg in the evening   pantoprazole (PROTONIX) 40 mg tablet 9/3/2024  Yes No   simvastatin (ZOCOR) 20 mg tablet 9/3/2024  Yes No   Sig: Take 20 mg by mouth daily at bedtime.      Facility-Administered Medications: None       Past Medical History:   Diagnosis Date    Cardiac disease     Diabetes mellitus (HCC)     GERD (gastroesophageal reflux disease)        Past Surgical History:   Procedure Laterality Date    CARDIAC PACEMAKER PLACEMENT       SECTION      x 2    JOINT REPLACEMENT         Family History   Problem Relation Age of Onset    Parkinsonism Mother     No Known Problems Father     No  Known Problems Brother     No Known Problems Maternal Aunt     No Known Problems Maternal Uncle     No Known Problems Paternal Aunt     No Known Problems Paternal Uncle     No Known Problems Maternal Grandmother     No Known Problems Maternal Grandfather     Breast cancer Paternal Grandmother 60    No Known Problems Paternal Grandfather     No Known Problems Daughter     No Known Problems Maternal Aunt     No Known Problems Paternal Aunt     ADD / ADHD Neg Hx     Anesthesia problems Neg Hx     Cancer Neg Hx     Clotting disorder Neg Hx     Collagen disease Neg Hx     Diabetes Neg Hx     Dislocations Neg Hx     Learning disabilities Neg Hx     Neurological problems Neg Hx     Osteoporosis Neg Hx     Rheumatologic disease Neg Hx     Scoliosis Neg Hx     Vascular Disease Neg Hx      I have reviewed and agree with the history as documented.    E-Cigarette/Vaping    E-Cigarette Use Never User      E-Cigarette/Vaping Substances     Social History     Tobacco Use    Smoking status: Never    Smokeless tobacco: Never   Vaping Use    Vaping status: Never Used   Substance Use Topics    Alcohol use: Yes     Comment: socially    Drug use: No       Review of Systems   Constitutional:  Positive for fever. Negative for chills.   Respiratory:  Positive for shortness of breath.    All other systems reviewed and are negative.      Physical Exam  Physical Exam  Vitals and nursing note reviewed.   Constitutional:       General: She is not in acute distress.     Appearance: She is well-developed. She is not ill-appearing, toxic-appearing or diaphoretic.   HENT:      Head: Normocephalic and atraumatic.      Right Ear: External ear normal.      Left Ear: External ear normal.      Nose: Nose normal.   Eyes:      General: Lids are normal. No scleral icterus.  Cardiovascular:      Rate and Rhythm: Normal rate and regular rhythm.      Heart sounds: Normal heart sounds. No murmur heard.     No friction rub. No gallop.   Pulmonary:      Effort:  Pulmonary effort is normal. No respiratory distress.      Breath sounds: Wheezing present. No rales.   Abdominal:      Palpations: Abdomen is soft.      Tenderness: There is no abdominal tenderness. There is no guarding or rebound.   Musculoskeletal:         General: No deformity. Normal range of motion.      Cervical back: Normal range of motion and neck supple.   Skin:     General: Skin is warm and dry.   Neurological:      General: No focal deficit present.      Mental Status: She is alert.   Psychiatric:         Mood and Affect: Mood normal.         Behavior: Behavior normal.         Vital Signs  ED Triage Vitals   Temperature Pulse Respirations Blood Pressure SpO2   09/05/24 1511 09/05/24 1511 09/05/24 1511 09/05/24 1514 09/05/24 1514   (!) 97.3 °F (36.3 °C) 68 18 132/79 97 %      Temp Source Heart Rate Source Patient Position - Orthostatic VS BP Location FiO2 (%)   09/05/24 1720 09/05/24 1700 09/05/24 1720 09/05/24 1720 --   Oral Monitor Lying Right arm       Pain Score       09/05/24 1511       6           Vitals:    09/06/24 1719 09/06/24 2317 09/07/24 0730 09/07/24 0905   BP: (!) 120/45 99/65 101/64 103/63   Pulse: 68 (!) 115 (!) 111 (!) 117   Patient Position - Orthostatic VS:   Lying          Visual Acuity      ED Medications  Medications   sodium chloride (PF) 0.9 % injection 3 mL (has no administration in time range)   heparin (porcine) subcutaneous injection 5,000 Units (5,000 Units Subcutaneous Given 9/7/24 0553)   aspirin chewable tablet 81 mg (81 mg Oral Given 9/7/24 0800)   gabapentin (NEURONTIN) capsule 300 mg (300 mg Oral Given 9/7/24 0800)   DULoxetine (CYMBALTA) delayed release capsule 30 mg (30 mg Oral Given 9/7/24 0800)   pantoprazole (PROTONIX) EC tablet 40 mg (40 mg Oral Given 9/7/24 0553)   pravastatin (PRAVACHOL) tablet 40 mg (40 mg Oral Given 9/6/24 1616)   cefTRIAXone (ROCEPHIN) IVPB (premix in dextrose) 1,000 mg 50 mL (1,000 mg Intravenous New Bag 9/6/24 1604)   doxycycline hyclate  (VIBRAMYCIN) capsule 100 mg (100 mg Oral Given 9/7/24 0800)   fluticasone (ARNUITY ELLIPTA) 200 MCG/ACT inhaler 1 puff (1 puff Inhalation Given 9/7/24 0801)   ipratropium-albuterol (DUO-NEB) 0.5-2.5 mg/3 mL inhalation solution 3 mL (3 mL Nebulization Given 9/7/24 0710)   insulin lispro (HumALOG/ADMELOG) 100 units/mL subcutaneous injection 1-6 Units (1 Units Subcutaneous Given 9/7/24 0759)   guaiFENesin (MUCINEX) 12 hr tablet 600 mg (600 mg Oral Given 9/7/24 0800)   sodium chloride 0.9 % infusion (75 mL/hr Intravenous Restarted 9/6/24 1619)   metoprolol tartrate (LOPRESSOR) tablet 25 mg (25 mg Oral Not Given 9/7/24 0758)   sodium chloride 0.9 % infusion (100 mL/hr Intravenous New Bag 9/7/24 0800)   magnesium sulfate 2 g/50 mL IVPB (premix) 2 g (2 g Intravenous New Bag 9/7/24 0902)   albuterol inhalation solution 5 mg (5 mg Nebulization Given 9/5/24 1548)   ipratropium (ATROVENT) 0.02 % inhalation solution 0.5 mg (0.5 mg Nebulization Given 9/5/24 1549)   acetaminophen (Ofirmev) injection 1,000 mg (0 mg Intravenous Stopped 9/5/24 1657)   magnesium sulfate 2 g/50 mL IVPB (premix) 2 g (0 g Intravenous Stopped 9/5/24 1912)   cefTRIAXone (ROCEPHIN) IVPB (premix in dextrose) 1,000 mg 50 mL (0 mg Intravenous Stopped 9/5/24 1912)   iohexol (OMNIPAQUE) 350 MG/ML injection (MULTI-DOSE) 85 mL (85 mL Intravenous Given 9/5/24 1937)   sodium chloride 0.9 % bolus 250 mL (250 mL Intravenous New Bag 9/6/24 0755)   albumin human (FLEXBUMIN) 5 % injection 12.5 g (12.5 g Intravenous New Bag 9/6/24 1749)   albumin human (FLEXBUMIN) 5 % injection 12.5 g (12.5 g Intravenous New Bag 9/7/24 0809)       Diagnostic Studies  Results Reviewed       Procedure Component Value Units Date/Time    Blood culture #1 [[id number]] Collected: 09/05/24 1758    Lab Status: Preliminary result Specimen: Blood from Arm, Left Updated: 09/07/24 0201     Blood Culture No Growth at 24 hrs.    Blood culture #2 [[id number]] Collected: 09/05/24 1758    Lab Status:  Preliminary result Specimen: Blood from Arm, Left Updated: 09/07/24 0201     Blood Culture No Growth at 24 hrs.    UA w Reflex to Microscopic w Reflex to Culture [[id number]]  (Abnormal) Collected: 09/06/24 1232    Lab Status: Final result Specimen: Urine, Clean Catch Updated: 09/06/24 1325     Color, UA Light Yellow     Clarity, UA Clear     Specific Gravity, UA <1.005     pH, UA 5.5     Leukocytes, UA Negative     Nitrite, UA Negative     Protein, UA Negative mg/dl      Glucose, UA 1000 (1%) mg/dl      Ketones, UA Trace mg/dl      Urobilinogen, UA <2.0 mg/dl      Bilirubin, UA Negative     Occult Blood, UA Trace    Legionella antigen, urine [[id number]]  (Normal) Collected: 09/05/24 2205    Lab Status: Final result Specimen: Urine, Clean Catch Updated: 09/06/24 1017     Legionella Urinary Antigen Negative    Strep Pneumoniae, Urine [[id number]]  (Normal) Collected: 09/05/24 2205    Lab Status: Final result Specimen: Urine, Clean Catch Updated: 09/06/24 1017     Strep pneumoniae antigen, urine Negative    Phosphorus [[id number]]  (Normal) Collected: 09/06/24 0455    Lab Status: Final result Specimen: Blood from Arm, Left Updated: 09/06/24 0604     Phosphorus 2.9 mg/dL     Magnesium [[id number]]  (Normal) Collected: 09/06/24 0455    Lab Status: Final result Specimen: Blood from Arm, Left Updated: 09/06/24 0604     Magnesium 1.9 mg/dL     Comprehensive metabolic panel [[id number]]  (Abnormal) Collected: 09/06/24 0455    Lab Status: Final result Specimen: Blood from Arm, Left Updated: 09/06/24 0604     Sodium 134 mmol/L      Potassium 3.8 mmol/L      Chloride 97 mmol/L      CO2 24 mmol/L      ANION GAP 13 mmol/L      BUN 22 mg/dL      Creatinine 1.29 mg/dL      Glucose 140 mg/dL      Glucose, Fasting 140 mg/dL      Calcium 9.2 mg/dL      AST 12 U/L      ALT 12 U/L      Alkaline Phosphatase 54 U/L      Total Protein 6.6 g/dL      Albumin 3.7 g/dL      Total Bilirubin 0.94 mg/dL      eGFR 42 ml/min/1.73sq m     Narrative:       National Kidney Disease Foundation guidelines for Chronic Kidney Disease (CKD):     Stage 1 with normal or high GFR (GFR > 90 mL/min/1.73 square meters)    Stage 2 Mild CKD (GFR = 60-89 mL/min/1.73 square meters)    Stage 3A Moderate CKD (GFR = 45-59 mL/min/1.73 square meters)    Stage 3B Moderate CKD (GFR = 30-44 mL/min/1.73 square meters)    Stage 4 Severe CKD (GFR = 15-29 mL/min/1.73 square meters)    Stage 5 End Stage CKD (GFR <15 mL/min/1.73 square meters)  Note: GFR calculation is accurate only with a steady state creatinine    Procalcitonin [[id number]]  (Abnormal) Collected: 09/06/24 0455    Lab Status: Final result Specimen: Blood from Arm, Left Updated: 09/06/24 0531     Procalcitonin 0.35 ng/ml     CBC and differential [[id number]]  (Abnormal) Collected: 09/06/24 0455    Lab Status: Final result Specimen: Blood from Arm, Left Updated: 09/06/24 0502     WBC 16.30 Thousand/uL      RBC 5.01 Million/uL      Hemoglobin 15.0 g/dL      Hematocrit 46.1 %      MCV 92 fL      MCH 29.9 pg      MCHC 32.5 g/dL      RDW 13.5 %      MPV 8.8 fL      Platelets 369 Thousands/uL      nRBC 0 /100 WBCs      Segmented % 79 %      Immature Grans % 1 %      Lymphocytes % 12 %      Monocytes % 7 %      Eosinophils Relative 1 %      Basophils Relative 0 %      Absolute Neutrophils 12.86 Thousands/µL      Absolute Immature Grans 0.09 Thousand/uL      Absolute Lymphocytes 1.91 Thousands/µL      Absolute Monocytes 1.18 Thousand/µL      Eosinophils Absolute 0.21 Thousand/µL      Basophils Absolute 0.05 Thousands/µL     HS Troponin I 4hr [[id number]]  (Normal) Collected: 09/06/24 0004    Lab Status: Final result Specimen: Blood from Arm, Left Updated: 09/06/24 0033     hs TnI 4hr 21 ng/L      Delta 4hr hsTnI 12 ng/L     Lactic acid, plasma (w/reflex if result > 2.0) [[id number]]  (Abnormal) Collected: 09/06/24 0004    Lab Status: Final result Specimen: Blood from Arm, Left Updated: 09/06/24 0025     LACTIC ACID 2.1 mmol/L     Narrative:       Result may be elevated if tourniquet was used during collection.    Hemoglobin A1c w/EAG Estimation (Prechecked if no A1C within 90 days) [[id number]]  (Abnormal) Collected: 09/05/24 1539    Lab Status: Final result Specimen: Blood from Arm, Left Updated: 09/05/24 2355     Hemoglobin A1C 7.0 %       mg/dl     Sputum culture and Gram stain [[id number]]     Lab Status: No result Specimen: Sputum     HS Troponin I 2hr [[id number]]  (Normal) Collected: 09/05/24 1818    Lab Status: Final result Specimen: Blood from Arm, Left Updated: 09/05/24 1846     hs TnI 2hr 12 ng/L      Delta 2hr hsTnI 3 ng/L     FLU/RSV/COVID - if FLU/RSV clinically relevant [[id number]]  (Normal) Collected: 09/05/24 1539    Lab Status: Final result Specimen: Nares from Nose Updated: 09/05/24 1656     SARS-CoV-2 Negative     INFLUENZA A PCR Negative     INFLUENZA B PCR Negative     RSV PCR Negative    Narrative:      This test has been performed using the CoV-2/Flu/RSV plus assay on the [employer] GeneXpert platform. This test has been validated by the  and verified by the performing laboratory.     This test is designed to amplify and detect the following: nucleocapsid (N), envelope (E), and RNA-dependent RNA polymerase (RdRP) genes of the SARS-CoV-2 genome; matrix (M), basic polymerase (PB2), and acidic protein (PA) segments of the influenza A genome; matrix (M) and non-structural protein (NS) segments of the influenza B genome, and the nucleocapsid genes of RSV A and RSV B.     Positive results are indicative of the presence of Flu A, Flu B, RSV, and/or SARS-CoV-2 RNA. Positive results for SARS-CoV-2 or suspected novel influenza should be reported to state, local, or federal health departments according to local reporting requirements.      All results should be assessed in conjunction with clinical presentation and other laboratory markers for clinical management.     FOR PEDIATRIC PATIENTS - copy/paste COVID Guidelines URL to  browser: [email or web address]       Procalcitonin [[id number]]  (Abnormal) Collected: 09/05/24 1539    Lab Status: Final result Specimen: Blood from Arm, Left Updated: 09/05/24 1626     Procalcitonin 0.39 ng/ml     HS Troponin 0hr (reflex protocol) [[id number]]  (Normal) Collected: 09/05/24 1539    Lab Status: Final result Specimen: Blood from Arm, Left Updated: 09/05/24 1623     hs TnI 0hr 9 ng/L     B-Type Natriuretic Peptide(BNP) [[id number]]  (Abnormal) Collected: 09/05/24 1539    Lab Status: Final result Specimen: Blood from Arm, Left Updated: 09/05/24 1623      pg/mL     Comprehensive metabolic panel [[id number]]  (Abnormal) Collected: 09/05/24 1539    Lab Status: Final result Specimen: Blood from Arm, Left Updated: 09/05/24 1617     Sodium 132 mmol/L      Potassium 4.1 mmol/L      Chloride 96 mmol/L      CO2 23 mmol/L      ANION GAP 13 mmol/L      BUN 22 mg/dL      Creatinine 1.25 mg/dL      Glucose 210 mg/dL      Calcium 9.2 mg/dL      AST 14 U/L      ALT 15 U/L      Alkaline Phosphatase 56 U/L      Total Protein 6.9 g/dL      Albumin 4.1 g/dL      Total Bilirubin 1.16 mg/dL      eGFR 44 ml/min/1.73sq m     Narrative:      National Kidney Disease Foundation guidelines for Chronic Kidney Disease (CKD):     Stage 1 with normal or high GFR (GFR > 90 mL/min/1.73 square meters)    Stage 2 Mild CKD (GFR = 60-89 mL/min/1.73 square meters)    Stage 3A Moderate CKD (GFR = 45-59 mL/min/1.73 square meters)    Stage 3B Moderate CKD (GFR = 30-44 mL/min/1.73 square meters)    Stage 4 Severe CKD (GFR = 15-29 mL/min/1.73 square meters)    Stage 5 End Stage CKD (GFR <15 mL/min/1.73 square meters)  Note: GFR calculation is accurate only with a steady state creatinine    Magnesium [[id number]]  (Abnormal) Collected: 09/05/24 1539    Lab Status: Final result Specimen: Blood from Arm, Left Updated: 09/05/24 1617     Magnesium 1.3 mg/dL     CBC and differential [[id number]]   (Abnormal) Collected: 09/05/24 1539    Lab Status: Final result Specimen: Blood from Arm, Left Updated: 09/05/24 1553     WBC 17.76 Thousand/uL      RBC 5.19 Million/uL      Hemoglobin 15.9 g/dL      Hematocrit 47.8 %      MCV 92 fL      MCH 30.6 pg      MCHC 33.3 g/dL      RDW 13.2 %      MPV 9.1 fL      Platelets 424 Thousands/uL      nRBC 0 /100 WBCs      Segmented % 85 %      Immature Grans % 1 %      Lymphocytes % 7 %      Monocytes % 5 %      Eosinophils Relative 2 %      Basophils Relative 0 %      Absolute Neutrophils 15.10 Thousands/µL      Absolute Immature Grans 0.12 Thousand/uL      Absolute Lymphocytes 1.24 Thousands/µL      Absolute Monocytes 0.95 Thousand/µL      Eosinophils Absolute 0.28 Thousand/µL      Basophils Absolute 0.07 Thousands/µL                    CT chest w contrast   Final Result by Sandhya Nix MD (09/05 2040)      Mild patchy bilateral consolidation and groundglass opacity, greatest in the right lower lobe, compatible with pneumonia.      No cavitation.      No pleural effusion/empyema.         Workstation performed: [id number]         X-ray chest 1 view portable   Final Result by Harihs Hollingsworth MD (09/05 1624)      No acute cardiopulmonary disease.            Workstation performed: [id number]                    Procedures  ECG 12 Lead Documentation Only    Date/Time: 9/7/2024 10:13 AM    Performed by: Lizandro Lizama DO  Authorized by: Lizandro Lizama DO    ECG reviewed by me, the ED Provider: yes    Patient location:  ED  Interpretation:     Interpretation: abnormal    Rate:     ECG rate:  94    ECG rate assessment: normal    Rhythm:     Rhythm: paced    Pacing:     Capture:  Complete           ED Course  ED Course as of 09/07/24 1025   u Sep 05, 2024   1820 Patient starting to spike fever.  Will admit for presumed pneumonia.   1825 Discussed with SLIM. Accepts admission                              Initial Sepsis Screening       Row Name 09/06/24 1146     "            Is the patient's history suggestive of a new or worsening infection? Yes (Proceed)  -AA        Suspected source of infection pneumonia  -AA        Indicate SIRS criteria Hyperthemia > 38.3C (100.9F) OR Hypothermia <36C (96.8F);Tachycardia > 90 bpm;Leukocytosis (WBC > 36043 IJL) OR Leukopenia (WBC <4000 IJL) OR Bandemia (WBC >10% bands)  -AA        Are two or more of the above signs & symptoms of infection both present and new to the patient? Yes (Proceed)  -AA        Assess for evidence of organ dysfunction: Are any of the below criteria present within 6 hours of suspected infection and SIRS criteria that are NOT considered to be chronic conditions? Lactate > 2.0  -AA        Date of presentation of severe sepsis 09/05/24  -AA        Time of presentation of severe sepsis --        Sepsis Note: Click \"NEXT\" below (NOT \"close\") to generate sepsis note based on above information. YES (proceed by clicking \"NEXT\")  -AA                  User Key  (r) = Recorded By, (t) = Taken By, (c) = Cosigned By      Initials Name Provider Type    AA Melanie Kinney MD Physician                    SBIRT 22yo+      Flowsheet Row Most Recent Value   Initial Alcohol Screen: US AUDIT-C     1. How often do you have a drink containing alcohol? 0 Filed at: 09/05/2024 1511   2. How many drinks containing alcohol do you have on a typical day you are drinking?  0 Filed at: 09/05/2024 1511   3a. Male UNDER 65: How often do you have five or more drinks on one occasion? 0 Filed at: 09/05/2024 1511   3b. FEMALE Any Age, or MALE 65+: How often do you have 4 or more drinks on one occassion? 0 Filed at: 09/05/2024 1511   Audit-C Score 0 Filed at: 09/05/2024 1511   ALISSA: How many times in the past year have you...    Used an illegal drug or used a prescription medication for non-medical reasons? Never Filed at: 09/05/2024 1511                      Medical Decision Making  Patient is a 69 y.o. female who presents to the ED for cough, shortness " "of breath, viral URI symptoms.  Patient is nontoxic appearing.  She is tachycardic.  On exam there is mild scattered wheezing bilaterally.    Differential includes but is not limited to: Viral URI, pneumonia.  Low suspicion for ACS.    Plan: Labs, chest x-ray, symptomatic treatment, reassess                 Portions of the record may have been created with voice recognition software. Occasional wrong word or \"sound a like\" substitutions may have occurred due to the inherent limitations of voice recognition software. Read the chart carefully and recognize, using context, where substitutions have occurred.    Amount and/or Complexity of Data Reviewed  Labs: ordered.  Radiology: ordered.    Risk  Prescription drug management.  Decision regarding hospitalization.                 Disposition  Final diagnoses:   Pneumonia     Time reflects when diagnosis was documented in both MDM as applicable and the Disposition within this note       Time User Action Codes Description Comment    9/5/2024  6:25 PM Lizandro Lizama Add [J18.9] Pneumonia           ED Disposition       ED Disposition   Admit    Condition   Stable    Date/Time   Thu Sep 5, 2024 8742    Comment   Case was discussed with  and the patient's admission status was agreed to be Admission Status: observation status to the service of   .               Follow-up Information    None         Current Discharge Medication List        CONTINUE these medications which have NOT CHANGED    Details   losartan (COZAAR) 25 mg tablet Take 25 mg by mouth daily      aspirin 81 mg chewable tablet Chew 81 mg daily.      DULoxetine (CYMBALTA) 60 mg delayed release capsule       gabapentin (NEURONTIN) 300 mg capsule       JANUMET  MG per tablet       !! metoprolol succinate (TOPROL-XL) 100 mg 24 hr tablet       !! metoprolol succinate (TOPROL-XL) 50 mg 24 hr tablet Take 50 mg by mouth daily. 50 mg in am and 25 mg in the evening      pantoprazole (PROTONIX) 40 mg tablet     "   simvastatin (ZOCOR) 20 mg tablet Take 20 mg by mouth daily at bedtime.       !! - Potential duplicate medications found. Please discuss with provider.          No discharge procedures on file.    PDMP Review       None            ED Provider  Electronically Signed by             Lizandro Lizama DO  09/07/24 1025

## 2024-09-08 PROBLEM — J45.901 ASTHMA EXACERBATION: Status: RESOLVED | Noted: 2024-09-05 | Resolved: 2024-09-08

## 2024-09-08 PROBLEM — Z95.810 ICD (IMPLANTABLE CARDIOVERTER-DEFIBRILLATOR) IN PLACE: Status: ACTIVE | Noted: 2024-09-08

## 2024-09-08 LAB
ANION GAP SERPL CALCULATED.3IONS-SCNC: 9 MMOL/L (ref 4–13)
ATRIAL RATE: 120 BPM
ATRIAL RATE: 131 BPM
BASOPHILS # BLD AUTO: 0.04 THOUSANDS/ÂΜL (ref 0–0.1)
BASOPHILS NFR BLD AUTO: 1 % (ref 0–1)
BUN SERPL-MCNC: 10 MG/DL (ref 5–25)
CALCIUM SERPL-MCNC: 8.5 MG/DL (ref 8.4–10.2)
CHLORIDE SERPL-SCNC: 105 MMOL/L (ref 96–108)
CO2 SERPL-SCNC: 24 MMOL/L (ref 21–32)
CREAT SERPL-MCNC: 1.04 MG/DL (ref 0.6–1.3)
EOSINOPHIL # BLD AUTO: 0.61 THOUSAND/ÂΜL (ref 0–0.61)
EOSINOPHIL NFR BLD AUTO: 7 % (ref 0–6)
ERYTHROCYTE [DISTWIDTH] IN BLOOD BY AUTOMATED COUNT: 13.5 % (ref 11.6–15.1)
GFR SERPL CREATININE-BSD FRML MDRD: 55 ML/MIN/1.73SQ M
GLUCOSE SERPL-MCNC: 144 MG/DL (ref 65–140)
GLUCOSE SERPL-MCNC: 147 MG/DL (ref 65–140)
GLUCOSE SERPL-MCNC: 150 MG/DL (ref 65–140)
GLUCOSE SERPL-MCNC: 154 MG/DL (ref 65–140)
GLUCOSE SERPL-MCNC: 174 MG/DL (ref 65–140)
HCT VFR BLD AUTO: 39.4 % (ref 34.8–46.1)
HGB BLD-MCNC: 12.4 G/DL (ref 11.5–15.4)
IMM GRANULOCYTES # BLD AUTO: 0.04 THOUSAND/UL (ref 0–0.2)
IMM GRANULOCYTES NFR BLD AUTO: 1 % (ref 0–2)
LYMPHOCYTES # BLD AUTO: 1.85 THOUSANDS/ÂΜL (ref 0.6–4.47)
LYMPHOCYTES NFR BLD AUTO: 21 % (ref 14–44)
MAGNESIUM SERPL-MCNC: 1.8 MG/DL (ref 1.9–2.7)
MCH RBC QN AUTO: 30 PG (ref 26.8–34.3)
MCHC RBC AUTO-ENTMCNC: 31.5 G/DL (ref 31.4–37.4)
MCV RBC AUTO: 95 FL (ref 82–98)
MONOCYTES # BLD AUTO: 0.69 THOUSAND/ÂΜL (ref 0.17–1.22)
MONOCYTES NFR BLD AUTO: 8 % (ref 4–12)
NEUTROPHILS # BLD AUTO: 5.65 THOUSANDS/ÂΜL (ref 1.85–7.62)
NEUTS SEG NFR BLD AUTO: 62 % (ref 43–75)
NRBC BLD AUTO-RTO: 0 /100 WBCS
P AXIS: 72 DEGREES
PHOSPHATE SERPL-MCNC: 3.2 MG/DL (ref 2.3–4.1)
PLATELET # BLD AUTO: 339 THOUSANDS/UL (ref 149–390)
PMV BLD AUTO: 9.5 FL (ref 8.9–12.7)
POTASSIUM SERPL-SCNC: 3.6 MMOL/L (ref 3.5–5.3)
QRS AXIS: -81 DEGREES
QRS AXIS: -84 DEGREES
QRSD INTERVAL: 178 MS
QRSD INTERVAL: 198 MS
QT INTERVAL: 434 MS
QT INTERVAL: 462 MS
QTC INTERVAL: 539 MS
QTC INTERVAL: 636 MS
RBC # BLD AUTO: 4.14 MILLION/UL (ref 3.81–5.12)
SODIUM SERPL-SCNC: 138 MMOL/L (ref 135–147)
T WAVE AXIS: 83 DEGREES
T WAVE AXIS: 86 DEGREES
VENTRICULAR RATE: 114 BPM
VENTRICULAR RATE: 93 BPM
WBC # BLD AUTO: 8.88 THOUSAND/UL (ref 4.31–10.16)

## 2024-09-08 PROCEDURE — 80048 BASIC METABOLIC PNL TOTAL CA: CPT

## 2024-09-08 PROCEDURE — 83735 ASSAY OF MAGNESIUM: CPT

## 2024-09-08 PROCEDURE — 85025 COMPLETE CBC W/AUTO DIFF WBC: CPT

## 2024-09-08 PROCEDURE — 82948 REAGENT STRIP/BLOOD GLUCOSE: CPT

## 2024-09-08 PROCEDURE — 94640 AIRWAY INHALATION TREATMENT: CPT

## 2024-09-08 PROCEDURE — 99232 SBSQ HOSP IP/OBS MODERATE 35: CPT

## 2024-09-08 PROCEDURE — 94668 MNPJ CHEST WALL SBSQ: CPT

## 2024-09-08 PROCEDURE — 94664 DEMO&/EVAL PT USE INHALER: CPT

## 2024-09-08 PROCEDURE — 84100 ASSAY OF PHOSPHORUS: CPT

## 2024-09-08 PROCEDURE — 94760 N-INVAS EAR/PLS OXIMETRY 1: CPT

## 2024-09-08 PROCEDURE — 93010 ELECTROCARDIOGRAM REPORT: CPT | Performed by: INTERNAL MEDICINE

## 2024-09-08 RX ORDER — LANOLIN ALCOHOL/MO/W.PET/CERES
800 CREAM (GRAM) TOPICAL ONCE
Status: COMPLETED | OUTPATIENT
Start: 2024-09-08 | End: 2024-09-08

## 2024-09-08 RX ORDER — ACETAMINOPHEN 325 MG/1
650 TABLET ORAL EVERY 6 HOURS PRN
Status: DISCONTINUED | OUTPATIENT
Start: 2024-09-08 | End: 2024-09-09 | Stop reason: HOSPADM

## 2024-09-08 RX ADMIN — INSULIN LISPRO 1 UNITS: 100 INJECTION, SOLUTION INTRAVENOUS; SUBCUTANEOUS at 08:23

## 2024-09-08 RX ADMIN — HEPARIN SODIUM 5000 UNITS: 5000 INJECTION, SOLUTION INTRAVENOUS; SUBCUTANEOUS at 14:38

## 2024-09-08 RX ADMIN — ASPIRIN 81 MG CHEWABLE TABLET 81 MG: 81 TABLET CHEWABLE at 08:15

## 2024-09-08 RX ADMIN — FLUTICASONE FUROATE 1 PUFF: 200 POWDER RESPIRATORY (INHALATION) at 08:24

## 2024-09-08 RX ADMIN — SODIUM CHLORIDE 100 ML/HR: 0.9 INJECTION, SOLUTION INTRAVENOUS at 15:49

## 2024-09-08 RX ADMIN — HEPARIN SODIUM 5000 UNITS: 5000 INJECTION, SOLUTION INTRAVENOUS; SUBCUTANEOUS at 21:04

## 2024-09-08 RX ADMIN — GUAIFENESIN 600 MG: 600 TABLET, EXTENDED RELEASE ORAL at 08:15

## 2024-09-08 RX ADMIN — METOPROLOL TARTRATE 25 MG: 25 TABLET, FILM COATED ORAL at 15:48

## 2024-09-08 RX ADMIN — Medication 800 MG: at 09:43

## 2024-09-08 RX ADMIN — IPRATROPIUM BROMIDE AND ALBUTEROL SULFATE 3 ML: .5; 3 SOLUTION RESPIRATORY (INHALATION) at 07:18

## 2024-09-08 RX ADMIN — DOXYCYCLINE 100 MG: 100 CAPSULE ORAL at 08:15

## 2024-09-08 RX ADMIN — ACETAMINOPHEN 650 MG: 325 TABLET ORAL at 08:55

## 2024-09-08 RX ADMIN — DULOXETINE HYDROCHLORIDE 30 MG: 30 CAPSULE, DELAYED RELEASE ORAL at 08:15

## 2024-09-08 RX ADMIN — DOXYCYCLINE 100 MG: 100 CAPSULE ORAL at 21:04

## 2024-09-08 RX ADMIN — IPRATROPIUM BROMIDE AND ALBUTEROL SULFATE 3 ML: .5; 3 SOLUTION RESPIRATORY (INHALATION) at 01:53

## 2024-09-08 RX ADMIN — IPRATROPIUM BROMIDE AND ALBUTEROL SULFATE 3 ML: .5; 3 SOLUTION RESPIRATORY (INHALATION) at 19:33

## 2024-09-08 RX ADMIN — METOPROLOL TARTRATE 25 MG: 25 TABLET, FILM COATED ORAL at 23:14

## 2024-09-08 RX ADMIN — GUAIFENESIN 600 MG: 600 TABLET, EXTENDED RELEASE ORAL at 21:04

## 2024-09-08 RX ADMIN — CEFTRIAXONE 1000 MG: 1 INJECTION, SOLUTION INTRAVENOUS at 15:49

## 2024-09-08 RX ADMIN — SODIUM CHLORIDE 100 ML/HR: 0.9 INJECTION, SOLUTION INTRAVENOUS at 05:15

## 2024-09-08 RX ADMIN — HEPARIN SODIUM 5000 UNITS: 5000 INJECTION, SOLUTION INTRAVENOUS; SUBCUTANEOUS at 05:05

## 2024-09-08 RX ADMIN — METOPROLOL TARTRATE 25 MG: 25 TABLET, FILM COATED ORAL at 08:15

## 2024-09-08 RX ADMIN — GABAPENTIN 300 MG: 300 CAPSULE ORAL at 17:25

## 2024-09-08 RX ADMIN — IPRATROPIUM BROMIDE AND ALBUTEROL SULFATE 3 ML: .5; 3 SOLUTION RESPIRATORY (INHALATION) at 13:23

## 2024-09-08 RX ADMIN — GABAPENTIN 300 MG: 300 CAPSULE ORAL at 08:15

## 2024-09-08 RX ADMIN — PRAVASTATIN SODIUM 40 MG: 40 TABLET ORAL at 15:46

## 2024-09-08 RX ADMIN — PANTOPRAZOLE SODIUM 40 MG: 40 TABLET, DELAYED RELEASE ORAL at 05:05

## 2024-09-08 NOTE — ASSESSMENT & PLAN NOTE
Continue to hold Lasix   Metoprolol with hold parameters     -s/p IVF NS bolus 250 + albumin 250 with good response. MAP at 65 +    given history of CHF no aggressive fluid resuscitation was given   Case was discussed with ICU team and they agreed with the plan above on 9/6     -continue IVF @ 100 ml/hr  -administer albumin 250 x 1    -monitor vitals Q 4   -currently vitally stable

## 2024-09-08 NOTE — SEPSIS NOTE
"  Sepsis Note   Anna Ly 69 y.o. female MRN: [id number]  Unit/Bed#: [address] [place] [address] A Encounter: [id number]       Initial Sepsis Screening       Row Name 09/06/24 1146                Is the patient's history suggestive of a new or worsening infection? Yes (Proceed)  -AA        Suspected source of infection pneumonia  -AA        Indicate SIRS criteria Hyperthemia > 38.3C (100.9F) OR Hypothermia <36C (96.8F);Tachycardia > 90 bpm;Leukocytosis (WBC > 04048 IJL) OR Leukopenia (WBC <4000 IJL) OR Bandemia (WBC >10% bands)  -AA        Are two or more of the above signs & symptoms of infection both present and new to the patient? Yes (Proceed)  -AA        Assess for evidence of organ dysfunction: Are any of the below criteria present within 6 hours of suspected infection and SIRS criteria that are NOT considered to be chronic conditions? Lactate > 2.0  -AA        Date of presentation of severe sepsis 09/05/24  -AA        Time of presentation of severe sepsis --        Sepsis Note: Click \"NEXT\" below (NOT \"close\") to generate sepsis note based on above information. YES (proceed by clicking \"NEXT\")  -AA                  User Key  (r) = Recorded By, (t) = Taken By, (c) = Cosigned By      Initials Name Provider Type    ANOOP Kinney MD Physician                    Default Flowsheet Data (Last 720 Hours)       Sepsis Reassess       Row Name 09/08/24 1453                   Repeat Volume Status and Tissue Perfusion Assessment Performed    Date of Reassessment: 09/08/24  -AA        Time of Reassessment: --        Sepsis Reassessment Note: Click \"NEXT\" below (NOT \"close\") to generate sepsis reassessment note. --        Repeat Volume Status and Tissue Perfusion Assessment Performed --                  User Key  (r) = Recorded By, (t) = Taken By, (c) = Cosigned By      Initials Name Provider Type    ANOOP Kinney MD Physician                    Body mass index is 33.12 kg/m².  Wt Readings from Last 1 Encounters:   09/08/24 " 90.3 kg (199 lb)        Ideal body weight: 57 kg (125 lb 10.6 oz)  Adjusted ideal body weight: 70.3 kg (155 lb)

## 2024-09-08 NOTE — ASSESSMENT & PLAN NOTE
Wt Readings from Last 3 Encounters:   09/08/24 90.3 kg (199 lb)   09/28/23 91.6 kg (202 lb)   08/23/22 97.5 kg (215 lb)   EF 30 % per caridologist.   Patient reports baseline EF 30-38 %   ICD , paced rhythm     Home medicaitons:   Takes metoprolol  Lasix     Cardiologist: Sadi Liang.   Medical records obtained on 9/7/24   Previous Echo; EF 38%   Updated Echo 9/6/24; EF 30%     Plan:   Patient doesn't look volume overloaded  Continue to monitor I/Os , daily weight   Monitor vitals    Am labs

## 2024-09-08 NOTE — PLAN OF CARE
Problem: PAIN - ADULT  Goal: Verbalizes/displays adequate comfort level or baseline comfort level  Description: Interventions:  - Encourage patient to monitor pain and request assistance  - Assess pain using appropriate pain scale  - Administer analgesics based on type and severity of pain and evaluate response  - Implement non-pharmacological measures as appropriate and evaluate response  - Consider cultural and social influences on pain and pain management  - Notify physician/advanced practitioner if interventions unsuccessful or patient reports new pain  Outcome: Progressing     Problem: INFECTION - ADULT  Goal: Absence or prevention of progression during hospitalization  Description: INTERVENTIONS:  - Assess and monitor for signs and symptoms of infection  - Monitor lab/diagnostic results  - Monitor all insertion sites, i.e. indwelling lines, tubes, and drains  - Monitor endotracheal if appropriate and nasal secretions for changes in amount and color  - [place] appropriate cooling/warming therapies per order  - Administer medications as ordered  - Instruct and encourage patient and family to use good hand hygiene technique  - Identify and instruct in appropriate isolation precautions for identified infection/condition  Outcome: Progressing  Goal: Absence of fever/infection during neutropenic period  Description: INTERVENTIONS:  - Monitor WBC    Outcome: Progressing     Problem: SAFETY ADULT  Goal: Patient will remain free of falls  Description: INTERVENTIONS:  - Educate patient/family on patient safety including physical limitations  - Instruct patient to call for assistance with activity   - Consult OT/PT to assist with strengthening/mobility   - Keep Call bell within reach  - Keep bed low and locked with side rails adjusted as appropriate  - Keep care items and personal belongings within reach  - Initiate and maintain comfort rounds  - Make Fall Risk Sign visible to staff  - Offer Toileting every 2 Hours,  in advance of need  - Initiate/Maintain bed alarm    - Apply yellow socks and bracelet for high fall risk patients  - Consider moving patient to room near nurses station  Outcome: Progressing  Goal: Maintain or return to baseline ADL function  Description: INTERVENTIONS:  -  Assess patient's ability to carry out ADLs; assess patient's baseline for ADL function and identify physical deficits which impact ability to perform ADLs (bathing, care of mouth/teeth, toileting, grooming, dressing, etc.)  - Assess/evaluate cause of self-care deficits   - Assess range of motion  - Assess patient's mobility; develop plan if impaired  - Assess patient's need for assistive devices and provide as appropriate  - Encourage maximum independence but intervene and supervise when necessary  - Involve family in performance of ADLs  - Assess for home care needs following discharge   - Consider OT consult to assist with ADL evaluation and planning for discharge  - Provide patient education as appropriate  Outcome: Progressing  Goal: Maintains/Returns to pre admission functional level  Description: INTERVENTIONS:  - Perform AM-PAC 6 Click Basic Mobility/ Daily Activity assessment daily.  - Set and communicate daily mobility goal to care team and patient/family/caregiver.   - Collaborate with rehabilitation services on mobility goals if consulted    - Out of bed for toileting  - Record patient progress and toleration of activity level   Outcome: Progressing

## 2024-09-08 NOTE — ASSESSMENT & PLAN NOTE
Ventricular paced rhythm .   100-110 currently. Improving with medical management of pneumonia.     Continue metoprolol  Continue IVF for sepsis   Abx for pneumonia   Monitor on Tele

## 2024-09-08 NOTE — ASSESSMENT & PLAN NOTE
On presentation with tachycardia / leukocytosis with pneumonia as source of infection.   No IVF fluids given in ED   No LA checked in ED     Procal trending down from 0.39 to 0.16   legionella/strep urine studies negative   LA: 2.1 > 1.9   S/p  ml x 1 , Albumin 250 x 1 with good BP response   On low rate IVF @ 75 ml/hr     Weight-based IV fluids were not given secondary to concern for heart failure given the patient have systolic heart failure with no echo at baseline on chart.    CT chest: Mild patchy bilateral consolidation and groundglass opacity, greatest in the right lower lobe, compatible with pneumonia.     Continue ceftriaxone and doxycycline   continue NS IVF @ 100 ml/hr   Procal normalized   Monitor vitals   Respiratory protocol   Duferny   PTA inhaler   Am labs

## 2024-09-08 NOTE — PROGRESS NOTES
[facility]  Progress Note  Name: Anna Ly I  MRN: [id number]  Unit/Bed#: 2 [address] A I Date of Admission: 9/5/2024   Date of Service: 9/8/2024 I Hospital Day: 2    Assessment & Plan   ICD (implantable cardioverter-defibrillator) in place  Assessment & Plan  In place. Functional   Following up with Sadi Liang cardiology      Tachycardia  Assessment & Plan  Ventricular paced rhythm .   100-110 currently. Improving with medical management of pneumonia.     Continue metoprolol  Continue IVF for sepsis   Abx for pneumonia   Monitor on Tele     Chronic combined systolic and diastolic CHF (congestive heart failure) (HCC)  Assessment & Plan  Wt Readings from Last 3 Encounters:   09/08/24 90.3 kg (199 lb)   09/28/23 91.6 kg (202 lb)   08/23/22 97.5 kg (215 lb)   EF 30 % per caridologist.   Patient reports baseline EF 30-38 %   ICD , paced rhythm     Home medicaitons:   Takes metoprolol  Lasix     Cardiologist: Sadi Liang.   Medical records obtained on 9/7/24   Previous Echo; EF 38%   Updated Echo 9/6/24; EF 30%     Plan:   Patient doesn't look volume overloaded  Continue to monitor I/Os , daily weight   Monitor vitals    Am labs       Hypotension  Assessment & Plan  Continue to hold Lasix   Metoprolol with hold parameters     -s/p IVF NS bolus 250 + albumin 250 with good response. MAP at 65 +    given history of CHF no aggressive fluid resuscitation was given   Case was discussed with ICU team and they agreed with the plan above on 9/6     -continue IVF @ 100 ml/hr  -administer albumin 250 x 1    -monitor vitals Q 4   -currently vitally stable     Lobar pneumonia (HCC)  Assessment & Plan  Refer to A and P for sepsis     Other hyperlipidemia  Assessment & Plan  Continue statins     Primary hypertension  Assessment & Plan  Her medications includes metoprolol XR 50 mg a.m., 25 mg PM.  Currently on hold.  Patient transition to metoprolol 25 mg every 8.  Hold parameters discussed  with nursing    Controlled type 2 diabetes mellitus without complication, without long-term current use of insulin (HCC)  Assessment & Plan  Lab Results   Component Value Date    HGBA1C 7.0 (H) 09/05/2024       Recent Labs     09/07/24  1052 09/07/24  1601 09/08/24  0707 09/08/24  1101   POCGLU 182* 210* 174* 147*         Blood Sugar Average: Last 72 hrs:  (P) 173.7    SSI with accu checks       * Severe sepsis (HCC)  Assessment & Plan  On presentation with tachycardia / leukocytosis with pneumonia as source of infection.   No IVF fluids given in ED   No LA checked in ED     Procal trending down from 0.39 to 0.16   legionella/strep urine studies negative   LA: 2.1 > 1.9   S/p  ml x 1 , Albumin 250 x 1 with good BP response   On low rate IVF @ 75 ml/hr     Weight-based IV fluids were not given secondary to concern for heart failure given the patient have systolic heart failure with no echo at baseline on chart.    CT chest: Mild patchy bilateral consolidation and groundglass opacity, greatest in the right lower lobe, compatible with pneumonia.     Continue ceftriaxone and doxycycline   continue NS IVF @ 100 ml/hr   Procal normalized   Monitor vitals   Respiratory protocol   Duonebs   PTA inhaler   Am labs       Asthma exacerbation-resolved as of 9/8/2024  Assessment & Plan  Due to pneumonia       Continue PTA inhalers   Respiratory protocol   Duo nebs   Interval follow up                VTE Pharmacologic Prophylaxis:   Moderate Risk (Score 3-4) - Pharmacological DVT Prophylaxis Ordered: heparin.    Mobility:   Basic Mobility Inpatient Raw Score: 24  JH-HLM Goal: 8: Walk 250 feet or more  JH-HLM Achieved: 7: Walk 25 feet or more  JH-HLM Goal achieved. Continue to encourage appropriate mobility.    Patient Centered Rounds: I performed bedside rounds with nursing staff today.   Discussions with Specialists or Other Care Team Provider: PTOT CM     Education and Discussions with Family / Patient:  patient .      Total Time Spent on Date of Encounter in care of patient: 30 mins. This time was spent on one or more of the following: performing physical exam; counseling and coordination of care; obtaining or reviewing history; documenting in the medical record; reviewing/ordering tests, medications or procedures; communicating with other healthcare professionals and discussing with patient's family/caregivers.    Current Length of Stay: 2 day(s)  Current Patient Status: Inpatient   Certification Statement: The patient will continue to require additional inpatient hospital stay due to sepsis secondary to pneumonia   Discharge Plan: Anticipate discharge in 24-48 hrs to home.    Code Status: Level 1 - Full Code    Subjective:   Patient was seen today at bedside. Reports remarkable improvement.   Currently with no active complaints.   No chest pain or tightness, SOB or cough, dizziness or light headedness, N/V, Diarrhea of constipation.   No active urinary symptoms  Tolerating oral diet.       Objective:     Vitals:   Temp (24hrs), Av.7 °F (36.5 °C), Min:97.7 °F (36.5 °C), Max:97.7 °F (36.5 °C)    Temp:  [97.7 °F (36.5 °C)] 97.7 °F (36.5 °C)  HR:  [111-117] 115  Resp:  [16-18] 18  BP: (105-141)/(56-73) 126/73  SpO2:  [93 %-99 %] 96 %  Body mass index is 33.12 kg/m².     Input and Output Summary (last 24 hours):     Intake/Output Summary (Last 24 hours) at 2024 1137  Last data filed at 2024 0801  Gross per 24 hour   Intake 530 ml   Output --   Net 530 ml       Physical Exam:   Physical Exam  Vitals and nursing note reviewed.   Constitutional:       General: She is not in acute distress.     Appearance: Normal appearance.   HENT:      Head: Normocephalic and atraumatic.      Mouth/Throat:      Mouth: Mucous membranes are moist.   Eyes:      Conjunctiva/sclera: Conjunctivae normal.      Pupils: Pupils are equal, round, and reactive to light.   Cardiovascular:      Rate and Rhythm: Regular rhythm. Tachycardia present.       Pulses: Normal pulses.           Carotid pulses are 2+ on the right side and 2+ on the left side.       Radial pulses are 2+ on the right side and 2+ on the left side.        Dorsalis pedis pulses are 2+ on the right side and 2+ on the left side.      Heart sounds: Normal heart sounds, S1 normal and S2 normal. No murmur heard.  Pulmonary:      Effort: No tachypnea, bradypnea or accessory muscle usage.      Breath sounds: Normal breath sounds and air entry. No decreased breath sounds, wheezing, rhonchi or rales.   Abdominal:      General: Abdomen is flat. Bowel sounds are normal. There is no distension.      Palpations: Abdomen is soft.      Tenderness: There is no abdominal tenderness.   Musculoskeletal:      Right lower leg: No edema.      Left lower leg: No edema.   Neurological:      Mental Status: She is alert and oriented to person, place, and time. Mental status is at baseline.   Psychiatric:         Mood and Affect: Mood normal.         Behavior: Behavior normal.          Additional Data:     Labs:  Results from last 7 days   Lab Units 09/08/24  0511   WBC Thousand/uL 8.88   HEMOGLOBIN g/dL 12.4   HEMATOCRIT % 39.4   PLATELETS Thousands/uL 339   SEGS PCT % 62   LYMPHO PCT % 21   MONO PCT % 8   EOS PCT % 7*     Results from last 7 days   Lab Units 09/08/24  0511 09/07/24  0601 09/06/24  0455   SODIUM mmol/L 138   < > 134*   POTASSIUM mmol/L 3.6   < > 3.8   CHLORIDE mmol/L 105   < > 97   CO2 mmol/L 24   < > 24   BUN mg/dL 10   < > 22   CREATININE mg/dL 1.04   < > 1.29   ANION GAP mmol/L 9   < > 13   CALCIUM mg/dL 8.5   < > 9.2   ALBUMIN g/dL  --   --  3.7   TOTAL BILIRUBIN mg/dL  --   --  0.94   ALK PHOS U/L  --   --  54   ALT U/L  --   --  12   AST U/L  --   --  12*   GLUCOSE RANDOM mg/dL 154*   < > 140    < > = values in this interval not displayed.         Results from last 7 days   Lab Units 09/08/24  1101 09/08/24  0707 09/07/24  1601 09/07/24  1052 09/07/24  0705 09/06/24  2038 09/06/24  1621  09/06/24  1121 09/06/24  0710 09/05/24  2049   POC GLUCOSE mg/dl 147* 174* 210* 182* 156* 183* 177* 161* 150* 197*     Results from last 7 days   Lab Units 09/05/24  1539   HEMOGLOBIN A1C % 7.0*     Results from last 7 days   Lab Units 09/07/24  0601 09/06/24  0601 09/06/24  0455 09/06/24  0004 09/05/24  1539   LACTIC ACID mmol/L  --  1.9  --  2.1*  --    PROCALCITONIN ng/ml 0.16  --  0.35*  --  0.39*       Lines/Drains:  Invasive Devices       Peripheral Intravenous Line  Duration             Peripheral IV 09/05/24 Left Antecubital 2 days                      Telemetry:  Telemetry Orders (From admission, onward)               24 Hour Telemetry Monitoring  Continuous x 24 Hours (Telem)        Question:  Reason for 24 Hour Telemetry  Answer:  Arrhythmias requiring acute medical intervention / PPM or ICD malfunction                     Telemetry Reviewed:  paced rhythm. tachycardia  Indication for Continued Telemetry Use: Metabolic/electrolyte disturbance with high probability of dysrhythmia             Imaging: No pertinent imaging reviewed.    Recent Cultures (last 7 days):   Results from last 7 days   Lab Units 09/05/24  2205 09/05/24  1758   BLOOD CULTURE   --  No Growth at 48 hrs.  No Growth at 48 hrs.   LEGIONELLA URINARY ANTIGEN  Negative  --        Last 24 Hours Medication List:   Current Facility-Administered Medications   Medication Dose Route Frequency Provider Last Rate    acetaminophen  650 mg Oral Q6H PRN Melanie Kinney MD      aspirin  81 mg Oral Daily Melanie Kinney MD      cefTRIAXone  1,000 mg Intravenous Q24H Melanie Kinney MD Stopped (09/07/24 1536)    doxycycline hyclate  100 mg Oral Q12H [facility] Melanie Kinney MD      DULoxetine  30 mg Oral Daily Melanie Kinney MD      fluticasone  1 puff Inhalation Daily Melanie Kinney MD      gabapentin  300 mg Oral BID Melanie Kinney MD      guaiFENesin  600 mg Oral Q12H [facility] Melanie Kinney MD      heparin (porcine)  5,000 Units Subcutaneous Q8H [facility] Amin  MD Nirmal      insulin lispro  1-6 Units Subcutaneous TID AC Melanie Kinney MD      ipratropium-albuterol  3 mL Nebulization Q6H Melanie Kinney MD      metoprolol tartrate  25 mg Oral Q8H Melanie Kinney MD      pantoprazole  40 mg Oral Early Morning Melanie Kinney MD      pravastatin  40 mg Oral Daily With Dinner Melanie Kinney MD      sodium chloride (PF)  3 mL Intravenous Q1H PRN Melanie Kinney MD      sodium chloride  100 mL/hr Intravenous Continuous Melanie Kinney  mL/hr (09/08/24 0515)        Today, Patient Was Seen By: Melanie Kinney MD    **Please Note: This note may have been constructed using a voice recognition system.**

## 2024-09-08 NOTE — ASSESSMENT & PLAN NOTE
Lab Results   Component Value Date    HGBA1C 7.0 (H) 09/05/2024       Recent Labs     09/07/24  1052 09/07/24  1601 09/08/24  0707 09/08/24  1101   POCGLU 182* 210* 174* 147*         Blood Sugar Average: Last 72 hrs:  (P) 173.7    SSI with accu checks

## 2024-09-08 NOTE — PLAN OF CARE
Problem: PAIN - ADULT  Goal: Verbalizes/displays adequate comfort level or baseline comfort level  Description: Interventions:  - Encourage patient to monitor pain and request assistance  - Assess pain using appropriate pain scale  - Administer analgesics based on type and severity of pain and evaluate response  - Implement non-pharmacological measures as appropriate and evaluate response  - Consider cultural and social influences on pain and pain management  - Notify physician/advanced practitioner if interventions unsuccessful or patient reports new pain  Outcome: Progressing     Problem: INFECTION - ADULT  Goal: Absence or prevention of progression during hospitalization  Description: INTERVENTIONS:  - Assess and monitor for signs and symptoms of infection  - Monitor lab/diagnostic results  - Monitor all insertion sites, i.e. indwelling lines, tubes, and drains  - Monitor endotracheal if appropriate and nasal secretions for changes in amount and color  - [place] appropriate cooling/warming therapies per order  - Administer medications as ordered  - Instruct and encourage patient and family to use good hand hygiene technique  - Identify and instruct in appropriate isolation precautions for identified infection/condition  Outcome: Progressing     Problem: RESPIRATORY - ADULT  Goal: Achieves optimal ventilation and oxygenation  Description: INTERVENTIONS:  - Assess for changes in respiratory status  - Assess for changes in mentation and behavior  - Position to facilitate oxygenation and minimize respiratory effort  - Oxygen administered by appropriate delivery if ordered  - Initiate smoking cessation education as indicated  - Encourage broncho-pulmonary hygiene including cough, deep breathe, Incentive Spirometry  - Assess the need for suctioning and aspirate as needed  - Assess and instruct to report SOB or any respiratory difficulty  - Respiratory Therapy support as indicated  Outcome: Progressing

## 2024-09-09 VITALS
WEIGHT: 202.2 LBS | HEART RATE: 114 BPM | OXYGEN SATURATION: 93 % | BODY MASS INDEX: 33.69 KG/M2 | RESPIRATION RATE: 18 BRPM | TEMPERATURE: 98 F | HEIGHT: 65 IN | SYSTOLIC BLOOD PRESSURE: 118 MMHG | DIASTOLIC BLOOD PRESSURE: 69 MMHG

## 2024-09-09 PROBLEM — I95.9 HYPOTENSION: Status: RESOLVED | Noted: 2024-09-06 | Resolved: 2024-09-09

## 2024-09-09 PROBLEM — A41.9 SEVERE SEPSIS (HCC): Status: RESOLVED | Noted: 2024-09-05 | Resolved: 2024-09-09

## 2024-09-09 PROBLEM — R00.0 TACHYCARDIA: Status: RESOLVED | Noted: 2024-09-07 | Resolved: 2024-09-09

## 2024-09-09 PROBLEM — R65.20 SEVERE SEPSIS (HCC): Status: RESOLVED | Noted: 2024-09-05 | Resolved: 2024-09-09

## 2024-09-09 LAB
ANION GAP SERPL CALCULATED.3IONS-SCNC: 7 MMOL/L (ref 4–13)
BASOPHILS # BLD AUTO: 0.06 THOUSANDS/ÂΜL (ref 0–0.1)
BASOPHILS NFR BLD AUTO: 1 % (ref 0–1)
BUN SERPL-MCNC: 8 MG/DL (ref 5–25)
CALCIUM SERPL-MCNC: 8.6 MG/DL (ref 8.4–10.2)
CHLORIDE SERPL-SCNC: 108 MMOL/L (ref 96–108)
CO2 SERPL-SCNC: 22 MMOL/L (ref 21–32)
CREAT SERPL-MCNC: 1.01 MG/DL (ref 0.6–1.3)
EOSINOPHIL # BLD AUTO: 0.71 THOUSAND/ÂΜL (ref 0–0.61)
EOSINOPHIL NFR BLD AUTO: 8 % (ref 0–6)
ERYTHROCYTE [DISTWIDTH] IN BLOOD BY AUTOMATED COUNT: 13.5 % (ref 11.6–15.1)
GFR SERPL CREATININE-BSD FRML MDRD: 57 ML/MIN/1.73SQ M
GLUCOSE SERPL-MCNC: 169 MG/DL (ref 65–140)
GLUCOSE SERPL-MCNC: 174 MG/DL (ref 65–140)
HCT VFR BLD AUTO: 36.8 % (ref 34.8–46.1)
HGB BLD-MCNC: 11.7 G/DL (ref 11.5–15.4)
IMM GRANULOCYTES # BLD AUTO: 0.02 THOUSAND/UL (ref 0–0.2)
IMM GRANULOCYTES NFR BLD AUTO: 0 % (ref 0–2)
LYMPHOCYTES # BLD AUTO: 1.63 THOUSANDS/ÂΜL (ref 0.6–4.47)
LYMPHOCYTES NFR BLD AUTO: 19 % (ref 14–44)
MAGNESIUM SERPL-MCNC: 1.7 MG/DL (ref 1.9–2.7)
MCH RBC QN AUTO: 30.5 PG (ref 26.8–34.3)
MCHC RBC AUTO-ENTMCNC: 31.8 G/DL (ref 31.4–37.4)
MCV RBC AUTO: 96 FL (ref 82–98)
MONOCYTES # BLD AUTO: 0.65 THOUSAND/ÂΜL (ref 0.17–1.22)
MONOCYTES NFR BLD AUTO: 8 % (ref 4–12)
NEUTROPHILS # BLD AUTO: 5.43 THOUSANDS/ÂΜL (ref 1.85–7.62)
NEUTS SEG NFR BLD AUTO: 64 % (ref 43–75)
NRBC BLD AUTO-RTO: 0 /100 WBCS
PLATELET # BLD AUTO: 327 THOUSANDS/UL (ref 149–390)
PMV BLD AUTO: 9.5 FL (ref 8.9–12.7)
POTASSIUM SERPL-SCNC: 4.1 MMOL/L (ref 3.5–5.3)
RBC # BLD AUTO: 3.84 MILLION/UL (ref 3.81–5.12)
SODIUM SERPL-SCNC: 137 MMOL/L (ref 135–147)
WBC # BLD AUTO: 8.5 THOUSAND/UL (ref 4.31–10.16)

## 2024-09-09 PROCEDURE — 85025 COMPLETE CBC W/AUTO DIFF WBC: CPT

## 2024-09-09 PROCEDURE — 94640 AIRWAY INHALATION TREATMENT: CPT

## 2024-09-09 PROCEDURE — 82948 REAGENT STRIP/BLOOD GLUCOSE: CPT

## 2024-09-09 PROCEDURE — 80048 BASIC METABOLIC PNL TOTAL CA: CPT

## 2024-09-09 PROCEDURE — 83735 ASSAY OF MAGNESIUM: CPT

## 2024-09-09 PROCEDURE — 94760 N-INVAS EAR/PLS OXIMETRY 1: CPT

## 2024-09-09 PROCEDURE — 99239 HOSP IP/OBS DSCHRG MGMT >30: CPT

## 2024-09-09 PROCEDURE — 87205 SMEAR GRAM STAIN: CPT

## 2024-09-09 PROCEDURE — 87106 FUNGI IDENTIFICATION YEAST: CPT

## 2024-09-09 PROCEDURE — 87070 CULTURE OTHR SPECIMN AEROBIC: CPT

## 2024-09-09 RX ORDER — METOPROLOL TARTRATE 25 MG/1
25 TABLET, FILM COATED ORAL EVERY 8 HOURS
Qty: 90 TABLET | Refills: 0 | Status: SHIPPED | OUTPATIENT
Start: 2024-09-09 | End: 2024-10-09

## 2024-09-09 RX ORDER — MAGNESIUM SULFATE HEPTAHYDRATE 40 MG/ML
2 INJECTION, SOLUTION INTRAVENOUS ONCE
Status: COMPLETED | OUTPATIENT
Start: 2024-09-09 | End: 2024-09-09

## 2024-09-09 RX ORDER — CEFPODOXIME PROXETIL 200 MG/1
200 TABLET, FILM COATED ORAL 2 TIMES DAILY
Qty: 6 TABLET | Refills: 0 | Status: SHIPPED | OUTPATIENT
Start: 2024-09-09 | End: 2024-09-12

## 2024-09-09 RX ADMIN — FLUTICASONE FUROATE 1 PUFF: 200 POWDER RESPIRATORY (INHALATION) at 09:23

## 2024-09-09 RX ADMIN — MAGNESIUM SULFATE HEPTAHYDRATE 2 G: 40 INJECTION, SOLUTION INTRAVENOUS at 07:56

## 2024-09-09 RX ADMIN — IPRATROPIUM BROMIDE AND ALBUTEROL SULFATE 3 ML: .5; 3 SOLUTION RESPIRATORY (INHALATION) at 07:27

## 2024-09-09 RX ADMIN — METOPROLOL TARTRATE 25 MG: 25 TABLET, FILM COATED ORAL at 07:54

## 2024-09-09 RX ADMIN — INSULIN LISPRO 1 UNITS: 100 INJECTION, SOLUTION INTRAVENOUS; SUBCUTANEOUS at 07:54

## 2024-09-09 RX ADMIN — DOXYCYCLINE 100 MG: 100 CAPSULE ORAL at 09:22

## 2024-09-09 RX ADMIN — GUAIFENESIN 600 MG: 600 TABLET, EXTENDED RELEASE ORAL at 09:23

## 2024-09-09 RX ADMIN — DULOXETINE HYDROCHLORIDE 30 MG: 30 CAPSULE, DELAYED RELEASE ORAL at 09:22

## 2024-09-09 RX ADMIN — IPRATROPIUM BROMIDE AND ALBUTEROL SULFATE 3 ML: .5; 3 SOLUTION RESPIRATORY (INHALATION) at 02:25

## 2024-09-09 RX ADMIN — PANTOPRAZOLE SODIUM 40 MG: 40 TABLET, DELAYED RELEASE ORAL at 05:47

## 2024-09-09 RX ADMIN — HEPARIN SODIUM 5000 UNITS: 5000 INJECTION, SOLUTION INTRAVENOUS; SUBCUTANEOUS at 05:47

## 2024-09-09 RX ADMIN — GABAPENTIN 300 MG: 300 CAPSULE ORAL at 09:23

## 2024-09-09 RX ADMIN — ASPIRIN 81 MG CHEWABLE TABLET 81 MG: 81 TABLET CHEWABLE at 09:22

## 2024-09-09 NOTE — DISCHARGE SUMMARY
Formerly [facility]  Discharge- Anna Ly 1955, 69 y.o. female MRN: [id number]  Unit/Bed#: [address] [place] [address] A Encounter: [id number]  Primary Care Provider: Delbert Duarte MD   Date and time admitted to hospital: 9/5/2024  3:14 PM    ICD (implantable cardioverter-defibrillator) in place  Assessment & Plan  In place. Functional   Following up with Sadi Liang cardiology      Chronic combined systolic and diastolic CHF (congestive heart failure) (HCC)  Assessment & Plan  Wt Readings from Last 3 Encounters:   09/09/24 91.7 kg (202 lb 3.2 oz)   09/28/23 91.6 kg (202 lb)   08/23/22 97.5 kg (215 lb)   EF 30 % per caridologist.   Patient reports baseline EF 30-38 %   ICD , paced rhythm     Home medicaitons:   Takes metoprolol    Cardiologist: Sadi Liang.   Medical records obtained on 9/7/24   Previous Echo; EF 38%   Updated Echo 9/6/24; EF 30%     Plan:   Patient doesn't look volume overloaded  Continue home medications  Ambulatory follow-up with cardiology next 7 days      Lobar pneumonia (HCC)  Assessment & Plan  Completed 4 days ceftriaxone and doxycycline.  Urine antigens for Legionella and strep negative.  Blood cultures negative at 48 hours  Vitally stable  Patient has been transitioned to cefpodoxime and will complete antibiotic course for pneumonia  Ambulatory follow-up with PCP    Other hyperlipidemia  Assessment & Plan  Continue statins     Primary hypertension  Assessment & Plan  Her medications includes metoprolol XR 50 mg a.m., 25 mg PM.  Currently on hold.  Patient transition to metoprolol 25 mg every 8.  Vital stable on the regimen above.  Continue with discharge  Ambulatory follow-up with cardiology.      Controlled type 2 diabetes mellitus without complication, without long-term current use of insulin ([facility])  Assessment & Plan  Lab Results   Component Value Date    HGBA1C 7.0 (H) 09/05/2024       Recent Labs     09/08/24  1101 09/08/24  1548 09/08/24  2044 09/09/24  0748    POCGLU 147* 144* 150* 169*         Blood Sugar Average: Last 72 hrs:  (P) [id number]    SSI with accu checks during hospitalization  Restart home medications  Ambulatory follow-up with PCP      Tachycardia-resolved as of 9/9/2024  Assessment & Plan  Ventricular paced rhythm .   100-110 currently. Improving with medical management of pneumonia.     Resolved.  Metoprolol dosing changed to 25 mg 3 times daily  Ambulatory follow-up with PCP    Hypotension-resolved as of 9/9/2024  Assessment & Plan  Continue to hold Lasix   Metoprolol with hold parameters     -s/p IVF NS bolus 250 + albumin 250 with good response. MAP at 65 +    given history of CHF no aggressive fluid resuscitation was given   Case was discussed with ICU team and they agreed with the plan above on 9/6     -Resolved with fluid resuscitation.  -Currently normotensive.  Vitally stable.    * Severe sepsis (HCC)-resolved as of 9/9/2024  Assessment & Plan  On presentation with tachycardia / leukocytosis with pneumonia as source of infection.   No IVF fluids given in ED   No LA checked in ED     Procal trending down from 0.39 to 0.16   legionella/strep urine studies negative   LA: 2.1 > 1.9   S/p  ml x 1 , Albumin 250 x 1 with good BP response   On low rate IVF @ 75 ml/hr     Weight-based IV fluids were not given secondary to concern for heart failure given the patient have systolic heart failure with no echo at baseline on chart.    CT chest: Mild patchy bilateral consolidation and groundglass opacity, greatest in the right lower lobe, compatible with pneumonia.     Completed 4 days ceftriaxone and doxycycline.  Urine antigens for Legionella and strep negative.  Blood cultures negative at 48 hours  Patient has been transitioned to cefpodoxime and will complete antibiotic course for pneumonia  Ambulatory follow-up with PCP  Sepsis resolved          Medical Problems       Resolved Problems  Date Reviewed: 9/9/2024            Resolved    *  (Principal) Severe sepsis (HCC) 9/9/2024     Resolved by  Melanie Kinney MD    Asthma exacerbation 9/8/2024     Resolved by  Melanie Kinney MD    Hypotension 9/9/2024     Resolved by  Melanie Kinney MD    Tachycardia 9/9/2024     Resolved by  Melanie Kinney MD        Discharging Physician / Practitioner: Melanie Kinney MD  PCP: Delbert Duarte MD  Admission Date:   Admission Orders (From admission, onward)       Ordered        09/06/24 1535  INPATIENT ADMISSION  Once            09/05/24 1825  Place in Observation  Once                          Discharge Date: 09/09/24    Consultations During Hospital Stay:  PTOT, CM     Procedures Performed:   CT chest w contrast   Final Result      Mild patchy bilateral consolidation and groundglass opacity, greatest in the right lower lobe, compatible with pneumonia.      No cavitation.      No pleural effusion/empyema.         Workstation performed: [id number]         X-ray chest 1 view portable   Final Result      No acute cardiopulmonary disease.            Workstation performed: [id number]               Significant Findings / Test Results:   Results for orders placed or performed during the hospital encounter of 09/05/24   FLU/RSV/COVID - if FLU/RSV clinically relevant    Specimen: Nose; Nares   Result Value Ref Range    SARS-CoV-2 Negative Negative    INFLUENZA A PCR Negative Negative    INFLUENZA B PCR Negative Negative    RSV PCR Negative Negative   Blood culture #1    Specimen: Arm, Left; Blood   Result Value Ref Range    Blood Culture No Growth at 72 hrs.    Blood culture #2    Specimen: Arm, Left; Blood   Result Value Ref Range    Blood Culture No Growth at 72 hrs.    Strep Pneumoniae, Urine    Specimen: Urine, Clean Catch   Result Value Ref Range    Strep pneumoniae antigen, urine Negative Negative   Legionella antigen, urine    Specimen: Urine, Clean Catch   Result Value Ref Range    Legionella Urinary Antigen Negative Negative   CBC and differential   Result Value  "Ref Range    WBC 17.76 (H) 4.31 - 10.16 Thousand/uL    RBC 5.19 (H) 3.81 - 5.12 Million/uL    Hemoglobin 15.9 (H) 11.5 - 15.4 g/dL    Hematocrit 47.8 (H) 34.8 - 46.1 %    MCV 92 82 - 98 fL    MCH 30.6 26.8 - 34.3 pg    MCHC 33.3 31.4 - 37.4 g/dL    RDW 13.2 11.6 - 15.1 %    MPV 9.1 8.9 - 12.7 fL    Platelets 424 (H) 149 - 390 Thousands/uL    nRBC 0 /100 WBCs    Segmented % 85 (H) 43 - 75 %    Immature Grans % 1 0 - 2 %    Lymphocytes % 7 (L) 14 - 44 %    Monocytes % 5 4 - 12 %    Eosinophils Relative 2 0 - 6 %    Basophils Relative 0 0 - 1 %    Absolute Neutrophils 15.10 (H) 1.85 - 7.62 Thousands/µL    Absolute Immature Grans 0.12 0.00 - 0.20 Thousand/uL    Absolute Lymphocytes 1.24 0.60 - 4.47 Thousands/µL    Absolute Monocytes 0.95 0.17 - 1.22 Thousand/µL    Eosinophils Absolute 0.28 0.00 - 0.61 Thousand/µL    Basophils Absolute 0.07 0.00 - 0.10 Thousands/µL   HS Troponin 0hr (reflex protocol)   Result Value Ref Range    hs TnI 0hr 9 \"Refer to ACS Flowchart\"- see link ng/L   Comprehensive metabolic panel   Result Value Ref Range    Sodium 132 (L) 135 - 147 mmol/L    Potassium 4.1 3.5 - 5.3 mmol/L    Chloride 96 96 - 108 mmol/L    CO2 23 21 - 32 mmol/L    ANION GAP 13 4 - 13 mmol/L    BUN 22 5 - 25 mg/dL    Creatinine 1.25 0.60 - 1.30 mg/dL    Glucose 210 (H) 65 - 140 mg/dL    Calcium 9.2 8.4 - 10.2 mg/dL    AST 14 13 - 39 U/L    ALT 15 7 - 52 U/L    Alkaline Phosphatase 56 34 - 104 U/L    Total Protein 6.9 6.4 - 8.4 g/dL    Albumin 4.1 3.5 - 5.0 g/dL    Total Bilirubin 1.16 (H) 0.20 - 1.00 mg/dL    eGFR 44 ml/min/1.73sq m   B-Type Natriuretic Peptide(BNP)   Result Value Ref Range     (H) 0 - 100 pg/mL   Magnesium   Result Value Ref Range    Magnesium 1.3 (L) 1.9 - 2.7 mg/dL   Procalcitonin   Result Value Ref Range    Procalcitonin 0.39 (H) <=0.25 ng/ml   HS Troponin I 2hr   Result Value Ref Range    hs TnI 2hr 12 \"Refer to ACS Flowchart\"- see link ng/L    Delta 2hr hsTnI 3 <20 ng/L   HS Troponin I 4hr " "  Result Value Ref Range    hs TnI 4hr 21 \"Refer to ACS Flowchart\"- see link ng/L    Delta 4hr hsTnI 12 <20 ng/L   Lactic acid, plasma (w/reflex if result > 2.0)   Result Value Ref Range    LACTIC ACID 2.1 (H) 0.5 - 2.0 mmol/L   UA w Reflex to Microscopic w Reflex to Culture    Specimen: Urine, Clean Catch   Result Value Ref Range    Color, UA Light Yellow     Clarity, UA Clear     Specific Gravity, UA <1.005 (L) 1.005 - 1.030    pH, UA 5.5 4.5, 5.0, 5.5, 6.0, 6.5, 7.0, 7.5, 8.0    Leukocytes, UA Negative Negative    Nitrite, UA Negative Negative    Protein, UA Negative Negative mg/dl    Glucose, UA 1000 (1%) (A) Negative mg/dl    Ketones, UA Trace (A) Negative mg/dl    Urobilinogen, UA <2.0 <2.0 mg/dl mg/dl    Bilirubin, UA Negative Negative    Occult Blood, UA Trace (A) Negative   Hemoglobin A1c w/EAG Estimation (Prechecked if no A1C within 90 days)   Result Value Ref Range    Hemoglobin A1C 7.0 (H) Normal 4.0-5.6%; PreDiabetic 5.7-6.4%; Diabetic >=6.5%; Glycemic control for adults with diabetes <7.0% %     mg/dl   Lactic acid 2 Hours   Result Value Ref Range    LACTIC ACID 1.9 0.5 - 2.0 mmol/L   Phosphorus   Result Value Ref Range    Phosphorus 2.9 2.3 - 4.1 mg/dL   Magnesium   Result Value Ref Range    Magnesium 1.9 1.9 - 2.7 mg/dL   CBC and differential   Result Value Ref Range    WBC 16.30 (H) 4.31 - 10.16 Thousand/uL    RBC 5.01 3.81 - 5.12 Million/uL    Hemoglobin 15.0 11.5 - 15.4 g/dL    Hematocrit 46.1 34.8 - 46.1 %    MCV 92 82 - 98 fL    MCH 29.9 26.8 - 34.3 pg    MCHC 32.5 31.4 - 37.4 g/dL    RDW 13.5 11.6 - 15.1 %    MPV 8.8 (L) 8.9 - 12.7 fL    Platelets 369 149 - 390 Thousands/uL    nRBC 0 /100 WBCs    Segmented % 79 (H) 43 - 75 %    Immature Grans % 1 0 - 2 %    Lymphocytes % 12 (L) 14 - 44 %    Monocytes % 7 4 - 12 %    Eosinophils Relative 1 0 - 6 %    Basophils Relative 0 0 - 1 %    Absolute Neutrophils 12.86 (H) 1.85 - 7.62 Thousands/µL    Absolute Immature Grans 0.09 0.00 - 0.20 " Thousand/uL    Absolute Lymphocytes 1.91 0.60 - 4.47 Thousands/µL    Absolute Monocytes 1.18 0.17 - 1.22 Thousand/µL    Eosinophils Absolute 0.21 0.00 - 0.61 Thousand/µL    Basophils Absolute 0.05 0.00 - 0.10 Thousands/µL   Comprehensive metabolic panel   Result Value Ref Range    Sodium 134 (L) 135 - 147 mmol/L    Potassium 3.8 3.5 - 5.3 mmol/L    Chloride 97 96 - 108 mmol/L    CO2 24 21 - 32 mmol/L    ANION GAP 13 4 - 13 mmol/L    BUN 22 5 - 25 mg/dL    Creatinine 1.29 0.60 - 1.30 mg/dL    Glucose 140 65 - 140 mg/dL    Glucose, Fasting 140 (H) 65 - 99 mg/dL    Calcium 9.2 8.4 - 10.2 mg/dL    AST 12 (L) 13 - 39 U/L    ALT 12 7 - 52 U/L    Alkaline Phosphatase 54 34 - 104 U/L    Total Protein 6.6 6.4 - 8.4 g/dL    Albumin 3.7 3.5 - 5.0 g/dL    Total Bilirubin 0.94 0.20 - 1.00 mg/dL    eGFR 42 ml/min/1.73sq m   Procalcitonin   Result Value Ref Range    Procalcitonin 0.35 (H) <=0.25 ng/ml   Urine Microscopic   Result Value Ref Range    RBC, UA 0-1 None Seen, 0-1, 1-2, 2-4, 0-5 /hpf    WBC, UA 2-4 None Seen, 0-1, 1-2, 0-5, 2-4 /hpf    Epithelial Cells Occasional None Seen, Occasional /hpf    Bacteria, UA Occasional None Seen, Occasional /hpf   Magnesium   Result Value Ref Range    Magnesium 1.7 (L) 1.9 - 2.7 mg/dL   CBC and differential   Result Value Ref Range    WBC 11.88 (H) 4.31 - 10.16 Thousand/uL    RBC 4.31 3.81 - 5.12 Million/uL    Hemoglobin 13.0 11.5 - 15.4 g/dL    Hematocrit 40.8 34.8 - 46.1 %    MCV 95 82 - 98 fL    MCH 30.2 26.8 - 34.3 pg    MCHC 31.9 31.4 - 37.4 g/dL    RDW 13.3 11.6 - 15.1 %    MPV 9.2 8.9 - 12.7 fL    Platelets 320 149 - 390 Thousands/uL    nRBC 0 /100 WBCs    Segmented % 71 43 - 75 %    Immature Grans % 1 0 - 2 %    Lymphocytes % 16 14 - 44 %    Monocytes % 9 4 - 12 %    Eosinophils Relative 3 0 - 6 %    Basophils Relative 0 0 - 1 %    Absolute Neutrophils 8.45 (H) 1.85 - 7.62 Thousands/µL    Absolute Immature Grans 0.06 0.00 - 0.20 Thousand/uL    Absolute Lymphocytes 1.93 0.60 - 4.47  Thousands/µL    Absolute Monocytes 1.09 0.17 - 1.22 Thousand/µL    Eosinophils Absolute 0.31 0.00 - 0.61 Thousand/µL    Basophils Absolute 0.04 0.00 - 0.10 Thousands/µL   Basic metabolic panel   Result Value Ref Range    Sodium 138 135 - 147 mmol/L    Potassium 4.1 3.5 - 5.3 mmol/L    Chloride 104 96 - 108 mmol/L    CO2 26 21 - 32 mmol/L    ANION GAP 8 4 - 13 mmol/L    BUN 17 5 - 25 mg/dL    Creatinine 1.17 0.60 - 1.30 mg/dL    Glucose 151 (H) 65 - 140 mg/dL    Calcium 8.9 8.4 - 10.2 mg/dL    eGFR 48 ml/min/1.73sq m   Procalcitonin   Result Value Ref Range    Procalcitonin 0.16 <=0.25 ng/ml   Phosphorus   Result Value Ref Range    Phosphorus 3.2 2.3 - 4.1 mg/dL   Magnesium   Result Value Ref Range    Magnesium 1.8 (L) 1.9 - 2.7 mg/dL   CBC and differential   Result Value Ref Range    WBC 8.88 4.31 - 10.16 Thousand/uL    RBC 4.14 3.81 - 5.12 Million/uL    Hemoglobin 12.4 11.5 - 15.4 g/dL    Hematocrit 39.4 34.8 - 46.1 %    MCV 95 82 - 98 fL    MCH 30.0 26.8 - 34.3 pg    MCHC 31.5 31.4 - 37.4 g/dL    RDW 13.5 11.6 - 15.1 %    MPV 9.5 8.9 - 12.7 fL    Platelets 339 149 - 390 Thousands/uL    nRBC 0 /100 WBCs    Segmented % 62 43 - 75 %    Immature Grans % 1 0 - 2 %    Lymphocytes % 21 14 - 44 %    Monocytes % 8 4 - 12 %    Eosinophils Relative 7 (H) 0 - 6 %    Basophils Relative 1 0 - 1 %    Absolute Neutrophils 5.65 1.85 - 7.62 Thousands/µL    Absolute Immature Grans 0.04 0.00 - 0.20 Thousand/uL    Absolute Lymphocytes 1.85 0.60 - 4.47 Thousands/µL    Absolute Monocytes 0.69 0.17 - 1.22 Thousand/µL    Eosinophils Absolute 0.61 0.00 - 0.61 Thousand/µL    Basophils Absolute 0.04 0.00 - 0.10 Thousands/µL   Basic metabolic panel   Result Value Ref Range    Sodium 138 135 - 147 mmol/L    Potassium 3.6 3.5 - 5.3 mmol/L    Chloride 105 96 - 108 mmol/L    CO2 24 21 - 32 mmol/L    ANION GAP 9 4 - 13 mmol/L    BUN 10 5 - 25 mg/dL    Creatinine 1.04 0.60 - 1.30 mg/dL    Glucose 154 (H) 65 - 140 mg/dL    Calcium 8.5 8.4 - 10.2  mg/dL    eGFR 55 ml/min/1.73sq m   Magnesium   Result Value Ref Range    Magnesium 1.7 (L) 1.9 - 2.7 mg/dL   CBC and differential   Result Value Ref Range    WBC 8.50 4.31 - 10.16 Thousand/uL    RBC 3.84 3.81 - 5.12 Million/uL    Hemoglobin 11.7 11.5 - 15.4 g/dL    Hematocrit 36.8 34.8 - 46.1 %    MCV 96 82 - 98 fL    MCH 30.5 26.8 - 34.3 pg    MCHC 31.8 31.4 - 37.4 g/dL    RDW 13.5 11.6 - 15.1 %    MPV 9.5 8.9 - 12.7 fL    Platelets 327 149 - 390 Thousands/uL    nRBC 0 /100 WBCs    Segmented % 64 43 - 75 %    Immature Grans % 0 0 - 2 %    Lymphocytes % 19 14 - 44 %    Monocytes % 8 4 - 12 %    Eosinophils Relative 8 (H) 0 - 6 %    Basophils Relative 1 0 - 1 %    Absolute Neutrophils 5.43 1.85 - 7.62 Thousands/µL    Absolute Immature Grans 0.02 0.00 - 0.20 Thousand/uL    Absolute Lymphocytes 1.63 0.60 - 4.47 Thousands/µL    Absolute Monocytes 0.65 0.17 - 1.22 Thousand/µL    Eosinophils Absolute 0.71 (H) 0.00 - 0.61 Thousand/µL    Basophils Absolute 0.06 0.00 - 0.10 Thousands/µL   Basic metabolic panel   Result Value Ref Range    Sodium 137 135 - 147 mmol/L    Potassium 4.1 3.5 - 5.3 mmol/L    Chloride 108 96 - 108 mmol/L    CO2 22 21 - 32 mmol/L    ANION GAP 7 4 - 13 mmol/L    BUN 8 5 - 25 mg/dL    Creatinine 1.01 0.60 - 1.30 mg/dL    Glucose 174 (H) 65 - 140 mg/dL    Calcium 8.6 8.4 - 10.2 mg/dL    eGFR 57 ml/min/1.73sq m   ECG 12 lead   Result Value Ref Range    Ventricular Rate 94 BPM    Atrial Rate 94 BPM    [place] Interval 152 ms    QRSD Interval 188 ms    QT Interval 436 ms    QTC Interval 545 ms    P Axis 73 degrees    QRS Axis -83 degrees    T Wave Axis 87 degrees   ECG 12 lead   Result Value Ref Range    Ventricular Rate 93 BPM    Atrial Rate 131 BPM    [place] Interval  ms    QRSD Interval 178 ms    QT Interval 434 ms    QTC Interval 539 ms    P [place] 72 degrees    QRS Axis -84 degrees    T Wave Axis 86 degrees   ECG 12 lead   Result Value Ref Range    Ventricular Rate 114 BPM    Atrial Rate 120 BPM    [place] Interval   ms    QRSD Interval 198 ms    QT Interval 462 ms    QTC Interval 636 ms    P Axis  degrees    QRS Axis -81 degrees    T Wave Axis 83 degrees   Echo complete w/ contrast if indicated   Result Value Ref Range    Triscuspid Valve Regurgitation Peak Gradient 43.0 mmHg    RAA A4C 14 cm2    EVER A4C 14.6 cm2    LA Volume Index (BP) 21.8 mL/m2    MV Peak A [place] 0.45 m/s    MV stenosis pressure 1/2 time 45 ms    MV Peak E [place] 125 cm/s    AV peak gradient 15 mmHg    LVOT stroke volume 49.35     Ao VTI 22.89 cm    Aortic valve peak velocity 1.92 m/s    LVOT peak VTI 12.99 cm    LVOT peak [place] 0.95 m/s    LVOT diameter 2.2 cm    E wave deceleration time 154 ms    E/A ratio 2.78     PV peak gradient antegrade 3 mmHg    MV valve area p 1/2 method 4.89     AV LVOT peak gradient 4 mmHg    AV mean gradient 9 mmHg    TR Peak [place] 3.3 m/s    AV area peak [place] 1.9 cm2    AV area by cont VTI 2.2 cm2    LVOT mn grad 2.0 mmHg    RVID d 2.7 cm    Aortic valve mean velocity 14.30 m/s    Tricuspid valve peak regurgitation velocity 3.29 m/s    Left ventricular stroke volume (2D) 42.00 mL    IVSd 1.20 cm    Tricuspid annular plane systolic excursion 2.00 cm    Ao root 3.50 cm    LVPWd 1.30 cm    LA size 3.1 cm    LA volume (BP) 43 mL    FS 25 28 - 44    LVIDS 3.30 cm    IVS 1.2 cm    LVIDd 4.40 cm    LA length (A2C) 5.20 cm    LEFT VENTRICLE SYSTOLIC VOLUME (MOD BIPLANE) 2D 44 mL    LV DIASTOLIC VOLUME (MOD BIPLANE) 2D 85 mL    LVOT Cardiac Index 2.63 l/min/m2    LVOT stroke volume index 24.40 ml/m2    LVOT Cardiac Output 5.18 l/min    Left Atrium Area-systolic Four Chamber 14.1 cm2    Left Atrium Area-systolic Apical Two Chamber 17.8 cm2    MV E' Tissue Velocity Lateral 6 cm/s    MV E' Tissue Velocity Septal 4 cm/s    LVSV, 2D 42 mL    LVOT area 3.80 cm2    DVI 0.49     AV valve area 2.16 cm2    BSA 1.97 m2   Fingerstick Glucose (POCT)   Result Value Ref Range    POC Glucose 197 (H) 65 - 140 mg/dl   Fingerstick Glucose (POCT)   Result Value Ref  Range    POC Glucose 150 (H) 65 - 140 mg/dl   Fingerstick Glucose (POCT)   Result Value Ref Range    POC Glucose 161 (H) 65 - 140 mg/dl   Fingerstick Glucose (POCT)   Result Value Ref Range    POC Glucose 177 (H) 65 - 140 mg/dl   Fingerstick Glucose (POCT)   Result Value Ref Range    POC Glucose 183 (H) 65 - 140 mg/dl   Fingerstick Glucose (POCT)   Result Value Ref Range    POC Glucose 156 (H) 65 - 140 mg/dl   Fingerstick Glucose (POCT)   Result Value Ref Range    POC Glucose 182 (H) 65 - 140 mg/dl   Fingerstick Glucose (POCT)   Result Value Ref Range    POC Glucose 210 (H) 65 - 140 mg/dl   Fingerstick Glucose (POCT)   Result Value Ref Range    POC Glucose 174 (H) 65 - 140 mg/dl   Fingerstick Glucose (POCT)   Result Value Ref Range    POC Glucose 147 (H) 65 - 140 mg/dl   Fingerstick Glucose (POCT)   Result Value Ref Range    POC Glucose 144 (H) 65 - 140 mg/dl   Fingerstick Glucose (POCT)   Result Value Ref Range    POC Glucose 150 (H) 65 - 140 mg/dl   Fingerstick Glucose (POCT)   Result Value Ref Range    POC Glucose 169 (H) 65 - 140 mg/dl         Incidental Findings:   None    I reviewed the above mentioned incidental findings with the patient and/or family and they expressed understanding.    Test Results Pending at Discharge (will require follow up):   Soutum culture       Outpatient Tests Requested:  None     Complications:  none     Reason for Admission: Severe sepsis secondary to pneumonia    Hospital Course:   Anna Ly is a 69 y.o. female patient who originally presented to the hospital on 9/5/2024 due to severe sepsis secondary to pneumonia.  Patient was maintained on IV fluids however at a lower rate given systolic heart failure at baseline.  She was noted to be tachycardic also with scattered sepsis.  Metoprolol was modified.  Currently patient is vitally stable and completed 4 days of antibiotics eluding ceftriaxone and doxycycline.  Urine antigens remain negative.  Blood cultures remain negative.  " Sputum culture is pending.  Patient is being discharged to home and will complete cefpodoxime antibiotic course and will follow-up with her PCP and cardiologist in the next 7 days.    Hospital Course: No notes on file        Please see above list of diagnoses and related plan for additional information.     Condition at Discharge: good    Discharge Day Visit / Exam:   Subjective: Patient was seen today at bedside.  Reports no active complaints.  No chest pain or tightness, SOB or cough, dizziness or light headedness, N/V, Diarrhea of constipation.   No active urinary symptoms  Tolerating oral diet.     Vitals: Blood Pressure: 118/69 (09/09/24 0750)  Pulse: (!) 114 (09/09/24 0750)  Temperature: 98 °F (36.7 °C) (09/1955)  Temp Source: Oral (09/1955)  Respirations: 18 (09/09/24 0750)  Height: 5' 5\" (165.1 cm) (09/06/24 1203)  Weight - Scale: 91.7 kg (202 lb 3.2 oz) (09/09/24 0600)  SpO2: 93 % (09/09/24 0750)  Exam:   Physical Exam  Vitals and nursing note reviewed.   Constitutional:       General: She is not in acute distress.     Appearance: Normal appearance.   HENT:      Head: Normocephalic and atraumatic.      Mouth/Throat:      Mouth: Mucous membranes are moist.   Eyes:      Conjunctiva/sclera: Conjunctivae normal.      Pupils: Pupils are equal, round, and reactive to light.   Cardiovascular:      Rate and Rhythm: Normal rate and regular rhythm.      Pulses: Normal pulses.           Carotid pulses are 2+ on the right side and 2+ on the left side.       Radial pulses are 2+ on the right side and 2+ on the left side.        Dorsalis pedis pulses are 2+ on the right side and 2+ on the left side.      Heart sounds: Normal heart sounds, S1 normal and S2 normal. No murmur heard.  Pulmonary:      Effort: No tachypnea, bradypnea or accessory muscle usage.      Breath sounds: Normal breath sounds and air entry. No decreased breath sounds, wheezing, rhonchi or rales.   Abdominal:      General: Abdomen is flat. " Bowel sounds are normal. There is no distension.      Palpations: Abdomen is soft.      Tenderness: There is no abdominal tenderness.   Musculoskeletal:      Right lower leg: No edema.      Left lower leg: No edema.   Neurological:      Mental Status: She is alert and oriented to person, place, and time. Mental status is at baseline.          Discussion with Family:  patient .     Discharge instructions/Information to patient and family:   See after visit summary for information provided to patient and family.      Provisions for Follow-Up Care:  See after visit summary for information related to follow-up care and any pertinent home health orders.      Mobility at time of Discharge:   Basic Mobility Inpatient Raw Score: 24  JH-HLM Goal: 8: Walk 250 feet or more  JH-HLM Achieved: 8: Walk 250 feet ot more  HLM Goal achieved. Continue to encourage appropriate mobility.     Disposition:   Home    Planned Readmission: none      Discharge Statement:  I spent 45 minutes discharging the patient. This time was spent on the day of discharge. I had direct contact with the patient on the day of discharge. Greater than 50% of the total time was spent examining patient, answering all patient questions, arranging and discussing plan of care with patient as well as directly providing post-discharge instructions.  Additional time then spent on discharge activities.    Discharge Medications:  See after visit summary for reconciled discharge medications provided to patient and/or family.      **Please Note: This note may have been constructed using a voice recognition system**

## 2024-09-09 NOTE — PLAN OF CARE
Problem: PAIN - ADULT  Goal: Verbalizes/displays adequate comfort level or baseline comfort level  Description: Interventions:  - Encourage patient to monitor pain and request assistance  - Assess pain using appropriate pain scale  - Administer analgesics based on type and severity of pain and evaluate response  - Implement non-pharmacological measures as appropriate and evaluate response  - Consider cultural and social influences on pain and pain management  - Notify physician/advanced practitioner if interventions unsuccessful or patient reports new pain  Outcome: Progressing     Problem: INFECTION - ADULT  Goal: Absence or prevention of progression during hospitalization  Description: INTERVENTIONS:  - Assess and monitor for signs and symptoms of infection  - Monitor lab/diagnostic results  - Monitor all insertion sites, i.e. indwelling lines, tubes, and drains  - Monitor endotracheal if appropriate and nasal secretions for changes in amount and color  - [place] appropriate cooling/warming therapies per order  - Administer medications as ordered  - Instruct and encourage patient and family to use good hand hygiene technique  - Identify and instruct in appropriate isolation precautions for identified infection/condition  Outcome: Progressing  Goal: Absence of fever/infection during neutropenic period  Description: INTERVENTIONS:  - Monitor WBC    Outcome: Progressing     Problem: SAFETY ADULT  Goal: Patient will remain free of falls  Description: INTERVENTIONS:  - Educate patient/family on patient safety including physical limitations  - Instruct patient to call for assistance with activity   - Consult OT/PT to assist with strengthening/mobility   - Keep Call bell within reach  - Keep bed low and locked with side rails adjusted as appropriate  - Keep care items and personal belongings within reach  - Initiate and maintain comfort rounds  - Make Fall Risk Sign visible to staff  - Offer Toileting every 2 Hours,  in advance of need  - Initiate/Maintain bed alarm  - Obtain necessary fall risk management equipment: walker  - Apply yellow socks and bracelet for high fall risk patients  - Consider moving patient to room near nurses station  Outcome: Progressing  Goal: Maintain or return to baseline ADL function  Description: INTERVENTIONS:  -  Assess patient's ability to carry out ADLs; assess patient's baseline for ADL function and identify physical deficits which impact ability to perform ADLs (bathing, care of mouth/teeth, toileting, grooming, dressing, etc.)  - Assess/evaluate cause of self-care deficits   - Assess range of motion  - Assess patient's mobility; develop plan if impaired  - Assess patient's need for assistive devices and provide as appropriate  - Encourage maximum independence but intervene and supervise when necessary  - Involve family in performance of ADLs  - Assess for home care needs following discharge   - Consider OT consult to assist with ADL evaluation and planning for discharge  - Provide patient education as appropriate  Outcome: Progressing  Goal: Maintains/Returns to pre admission functional level  Description: INTERVENTIONS:  - Perform AM-PAC 6 Click Basic Mobility/ Daily Activity assessment daily.  - Set and communicate daily mobility goal to care team and patient/family/caregiver.   - Collaborate with rehabilitation services on mobility goals if consulted  - Perform Range of Motion 3 times a day.  - Reposition patient every 2 hours.  - Dangle patient 2 times a day  - Stand patient 3 times a day  - Ambulate patient 3 times a day  - Out of bed to chair 3 times a day   - Out of bed for meals 3 times a day  - Out of bed for toileting  - Record patient progress and toleration of activity level   Outcome: Progressing     Problem: DISCHARGE PLANNING  Goal: Discharge to home or other facility with appropriate resources  Description: INTERVENTIONS:  - Identify barriers to discharge w/patient and  caregiver  - Arrange for needed discharge resources and transportation as appropriate  - Identify discharge learning needs (meds, wound care, etc.)  - Arrange for interpretive services to assist at discharge as needed  - Refer to Case Management Department for coordinating discharge planning if the patient needs post-hospital services based on physician/advanced practitioner order or complex needs related to functional status, cognitive ability, or social support system  Outcome: Progressing     Problem: Knowledge Deficit  Goal: Patient/family/caregiver demonstrates understanding of disease process, treatment plan, medications, and discharge instructions  Description: Complete learning assessment and assess knowledge base.  Interventions:  - Provide teaching at level of understanding  - Provide teaching via preferred learning methods  Outcome: Progressing     Problem: RESPIRATORY - ADULT  Goal: Achieves optimal ventilation and oxygenation  Description: INTERVENTIONS:  - Assess for changes in respiratory status  - Assess for changes in mentation and behavior  - Position to facilitate oxygenation and minimize respiratory effort  - Oxygen administered by appropriate delivery if ordered  - Initiate smoking cessation education as indicated  - Encourage broncho-pulmonary hygiene including cough, deep breathe, Incentive Spirometry  - Assess the need for suctioning and aspirate as needed  - Assess and instruct to report SOB or any respiratory difficulty  - Respiratory Therapy support as indicated  Outcome: Progressing

## 2024-09-09 NOTE — DISCHARGE INSTR - AVS FIRST PAGE
Please make sure to complete antibiotic course for pneumonia  Please make sure to start taking metoprolol 25 mg every 8 hours instead of your usual regimen.  Please make sure to follow-up with your cardiologist in the next 1 week.  Please make sure to follow-up with your family doctor in the next 7 days  Should your symptoms recur or new symptoms develop please report to the emergency department.

## 2024-09-09 NOTE — PLAN OF CARE
Problem: INFECTION - ADULT  Goal: Absence or prevention of progression during hospitalization  Description: INTERVENTIONS:  - Assess and monitor for signs and symptoms of infection  - Monitor lab/diagnostic results  - Monitor all insertion sites, i.e. indwelling lines, tubes, and drains  - Monitor endotracheal if appropriate and nasal secretions for changes in amount and color  - [place] appropriate cooling/warming therapies per order  - Administer medications as ordered  - Instruct and encourage patient and family to use good hand hygiene technique  - Identify and instruct in appropriate isolation precautions for identified infection/condition  Outcome: Progressing     Problem: RESPIRATORY - ADULT  Goal: Achieves optimal ventilation and oxygenation  Description: INTERVENTIONS:  - Assess for changes in respiratory status  - Assess for changes in mentation and behavior  - Position to facilitate oxygenation and minimize respiratory effort  - Oxygen administered by appropriate delivery if ordered  - Initiate smoking cessation education as indicated  - Encourage broncho-pulmonary hygiene including cough, deep breathe, Incentive Spirometry  - Assess the need for suctioning and aspirate as needed  - Assess and instruct to report SOB or any respiratory difficulty  - Respiratory Therapy support as indicated  Outcome: Progressing

## 2024-09-09 NOTE — ASSESSMENT & PLAN NOTE
Completed 4 days ceftriaxone and doxycycline.  Urine antigens for Legionella and strep negative.  Blood cultures negative at 48 hours  Vitally stable  Patient has been transitioned to cefpodoxime and will complete antibiotic course for pneumonia  Ambulatory follow-up with PCP

## 2024-09-09 NOTE — ASSESSMENT & PLAN NOTE
On presentation with tachycardia / leukocytosis with pneumonia as source of infection.   No IVF fluids given in ED   No LA checked in ED     Procal trending down from 0.39 to 0.16   legionella/strep urine studies negative   LA: 2.1 > 1.9   S/p  ml x 1 , Albumin 250 x 1 with good BP response   On low rate IVF @ 75 ml/hr     Weight-based IV fluids were not given secondary to concern for heart failure given the patient have systolic heart failure with no echo at baseline on chart.    CT chest: Mild patchy bilateral consolidation and groundglass opacity, greatest in the right lower lobe, compatible with pneumonia.     Completed 4 days ceftriaxone and doxycycline.  Urine antigens for Legionella and strep negative.  Blood cultures negative at 48 hours  Patient has been transitioned to cefpodoxime and will complete antibiotic course for pneumonia  Ambulatory follow-up with PCP  Sepsis resolved

## 2024-09-09 NOTE — ASSESSMENT & PLAN NOTE
Lab Results   Component Value Date    HGBA1C 7.0 (H) 09/05/2024       Recent Labs     09/08/24  1101 09/08/24  1548 09/08/24 2044 09/09/24  0748   POCGLU 147* 144* 150* 169*         Blood Sugar Average: Last 72 hrs:  (P) [id number]    SSI with accu checks during hospitalization  Restart home medications  Ambulatory follow-up with PCP

## 2024-09-09 NOTE — ASSESSMENT & PLAN NOTE
Wt Readings from Last 3 Encounters:   09/09/24 91.7 kg (202 lb 3.2 oz)   09/28/23 91.6 kg (202 lb)   08/23/22 97.5 kg (215 lb)   EF 30 % per caridologist.   Patient reports baseline EF 30-38 %   ICD , paced rhythm     Home medicaitons:   Takes metoprolol    Cardiologist: Sadi Liang.   Medical records obtained on 9/7/24   Previous Echo; EF 38%   Updated Echo 9/6/24; EF 30%     Plan:   Patient doesn't look volume overloaded  Continue home medications  Ambulatory follow-up with cardiology next 7 days

## 2024-09-09 NOTE — PLAN OF CARE
Problem: INFECTION - ADULT  Goal: Absence or prevention of progression during hospitalization  Description: INTERVENTIONS:  - Assess and monitor for signs and symptoms of infection  - Monitor lab/diagnostic results  - Monitor all insertion sites, i.e. indwelling lines, tubes, and drains  - Monitor endotracheal if appropriate and nasal secretions for changes in amount and color  - [place] appropriate cooling/warming therapies per order  - Administer medications as ordered  - Instruct and encourage patient and family to use good hand hygiene technique  - Identify and instruct in appropriate isolation precautions for identified infection/condition  Outcome: Progressing     Problem: DISCHARGE PLANNING  Goal: Discharge to home or other facility with appropriate resources  Description: INTERVENTIONS:  - Identify barriers to discharge w/patient and caregiver  - Arrange for needed discharge resources and transportation as appropriate  - Identify discharge learning needs (meds, wound care, etc.)  - Arrange for interpretive services to assist at discharge as needed  - Refer to Case Management Department for coordinating discharge planning if the patient needs post-hospital services based on physician/advanced practitioner order or complex needs related to functional status, cognitive ability, or social support system  Outcome: Progressing     Problem: Knowledge Deficit  Goal: Patient/family/caregiver demonstrates understanding of disease process, treatment plan, medications, and discharge instructions  Description: Complete learning assessment and assess knowledge base.  Interventions:  - Provide teaching at level of understanding  - Provide teaching via preferred learning methods  Outcome: Progressing     Problem: RESPIRATORY - ADULT  Goal: Achieves optimal ventilation and oxygenation  Description: INTERVENTIONS:  - Assess for changes in respiratory status  - Assess for changes in mentation and behavior  - Position to  facilitate oxygenation and minimize respiratory effort  - Oxygen administered by appropriate delivery if ordered  - Initiate smoking cessation education as indicated  - Encourage broncho-pulmonary hygiene including cough, deep breathe, Incentive Spirometry  - Assess the need for suctioning and aspirate as needed  - Assess and instruct to report SOB or any respiratory difficulty  - Respiratory Therapy support as indicated  Outcome: Progressing     Problem: INFECTION - ADULT  Goal: Absence or prevention of progression during hospitalization  Description: INTERVENTIONS:  - Assess and monitor for signs and symptoms of infection  - Monitor lab/diagnostic results  - Monitor all insertion sites, i.e. indwelling lines, tubes, and drains  - Monitor endotracheal if appropriate and nasal secretions for changes in amount and color  - [place] appropriate cooling/warming therapies per order  - Administer medications as ordered  - Instruct and encourage patient and family to use good hand hygiene technique  - Identify and instruct in appropriate isolation precautions for identified infection/condition  9/9/2024 1032 by Aminta Dorado RN  Outcome: Adequate for Discharge  9/9/2024 0746 by Aminta Dorado RN  Outcome: Progressing     Problem: Knowledge Deficit  Goal: Patient/family/caregiver demonstrates understanding of disease process, treatment plan, medications, and discharge instructions  Description: Complete learning assessment and assess knowledge base.  Interventions:  - Provide teaching at level of understanding  - Provide teaching via preferred learning methods  9/9/2024 1032 by Aminta Dorado RN  Outcome: Adequate for Discharge  9/9/2024 0746 by Aminta Dorado RN  Outcome: Progressing     Problem: RESPIRATORY - ADULT  Goal: Achieves optimal ventilation and oxygenation  Description: INTERVENTIONS:  - Assess for changes in respiratory status  - Assess for changes in mentation and behavior  - Position to  facilitate oxygenation and minimize respiratory effort  - Oxygen administered by appropriate delivery if ordered  - Initiate smoking cessation education as indicated  - Encourage broncho-pulmonary hygiene including cough, deep breathe, Incentive Spirometry  - Assess the need for suctioning and aspirate as needed  - Assess and instruct to report SOB or any respiratory difficulty  - Respiratory Therapy support as indicated  9/9/2024 1032 by Aminta Dorado RN  Outcome: Adequate for Discharge  9/9/2024 0746 by Aminta Dorado RN  Outcome: Progressing

## 2024-09-09 NOTE — ASSESSMENT & PLAN NOTE
Ventricular paced rhythm .   100-110 currently. Improving with medical management of pneumonia.     Resolved.  Metoprolol dosing changed to 25 mg 3 times daily  Ambulatory follow-up with PCP

## 2024-09-09 NOTE — NURSING NOTE
Review discharge instruction with pt,verbalized understanding of instruction. IV, [place] and tele box discontinue.

## 2024-09-09 NOTE — ASSESSMENT & PLAN NOTE
Her medications includes metoprolol XR 50 mg a.m., 25 mg PM.  Currently on hold.  Patient transition to metoprolol 25 mg every 8.  Vital stable on the regimen above.  Continue with discharge  Ambulatory follow-up with cardiology.

## 2024-09-09 NOTE — ASSESSMENT & PLAN NOTE
Continue to hold Lasix   Metoprolol with hold parameters     -s/p IVF NS bolus 250 + albumin 250 with good response. MAP at 65 +    given history of CHF no aggressive fluid resuscitation was given   Case was discussed with ICU team and they agreed with the plan above on 9/6     -Resolved with fluid resuscitation.  -Currently normotensive.  Vitally stable.

## 2024-09-10 NOTE — UTILIZATION REVIEW
NOTIFICATION OF ADMISSION DISCHARGE   This is a Notification of Discharge from [facility]. Please be advised that this patient has been discharge from our facility. Below you will find the admission and discharge date and time including the patient’s disposition.   UTILIZATION REVIEW CONTACT:  Adeola Leone  Utilization   Network Utilization Review Department  Phone: [phone] x carefully listen to the prompts. All voicemails are confidential.  Email: [email or web address]     ADMISSION INFORMATION  PRESENTATION DATE: 9/5/2024  3:14 PM  OBERVATION ADMISSION DATE: 09/05/2024 1825  INPATIENT ADMISSION DATE: 9/6/24  3:35 PM   DISCHARGE DATE: 9/9/2024 12:06 PM   DISPOSITION:Home/Self Care    Network Utilization Review Department  ATTENTION: Please call with any questions or concerns to [phone] and carefully listen to the prompts so that you are directed to the right person. All voicemails are confidential.   For Discharge needs, contact Care Management DC Support Team at [phone] opt. 2  Send all requests for admission clinical reviews, approved or denied determinations and any other requests to dedicated fax number below belonging to the campus where the patient is receiving treatment. List of dedicated fax numbers for the Facilities:  FACILITY NAME UR FAX NUMBER   ADMISSION DENIALS (Administrative/Medical Necessity) [phone]   DISCHARGE SUPPORT TEAM ([facility]) [phone]   PARENT CHILD HEALTH (Maternity/NICU/Pediatrics) [phone]   [facility] [phone]   [facility] [phone]   [facility] [phone]   [facility] [phone]   [facility] [phone]   [facility] [phone]   [facility] [phone]   [facility]  [phone]   [facility] [phone]   [facility] [phone]   [facility] [phone]   [facility] [phone]

## 2024-09-11 LAB
BACTERIA BLD CULT: NORMAL
BACTERIA BLD CULT: NORMAL
BACTERIA SPT RESP CULT: ABNORMAL
BACTERIA SPT RESP CULT: ABNORMAL
GRAM STN SPEC: ABNORMAL

## 2024-10-06 PROBLEM — J18.1 LOBAR PNEUMONIA (HCC): Status: RESOLVED | Noted: 2024-09-06 | Resolved: 2024-10-06

## 2024-10-10 ENCOUNTER — HOSPITAL ENCOUNTER (OUTPATIENT)
Dept: RADIOLOGY | Facility: HOSPITAL | Age: 69
Discharge: HOME | End: 2024-10-10
Payer: COMMERCIAL

## 2024-10-10 DIAGNOSIS — J18.9 PNEUMONIA OF BOTH LOWER LOBES DUE TO INFECTIOUS ORGANISM: ICD-10-CM

## 2024-10-10 PROCEDURE — 71046 X-RAY EXAM CHEST 2 VIEWS: CPT

## 2024-12-29 ENCOUNTER — APPOINTMENT (EMERGENCY)
Dept: RADIOLOGY | Facility: HOSPITAL | Age: 69
End: 2024-12-29
Payer: COMMERCIAL

## 2024-12-29 ENCOUNTER — HOSPITAL ENCOUNTER (EMERGENCY)
Facility: HOSPITAL | Age: 69
Discharge: HOME | End: 2024-12-29
Attending: EMERGENCY MEDICINE | Admitting: EMERGENCY MEDICINE
Payer: COMMERCIAL

## 2024-12-29 VITALS
DIASTOLIC BLOOD PRESSURE: 61 MMHG | HEART RATE: 74 BPM | WEIGHT: 208.4 LBS | SYSTOLIC BLOOD PRESSURE: 123 MMHG | OXYGEN SATURATION: 99 % | BODY MASS INDEX: 34.72 KG/M2 | TEMPERATURE: 98 F | HEIGHT: 65 IN | RESPIRATION RATE: 17 BRPM
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
## 2024-12-29 DIAGNOSIS — S62.101A RIGHT WRIST FRACTURE: ICD-10-CM

## 2024-12-29 PROCEDURE — 73110 X-RAY EXAM OF WRIST: CPT

## 2024-12-29 PROCEDURE — 29125 APPL SHORT ARM SPLINT STATIC: CPT | Performed by: PHYSICIAN ASSISTANT

## 2024-12-29 PROCEDURE — 99285 EMERGENCY DEPT VISIT HI MDM: CPT

## 2024-12-29 PROCEDURE — 99284 EMERGENCY DEPT VISIT MOD MDM: CPT | Performed by: PHYSICIAN ASSISTANT

## 2024-12-29 PROCEDURE — 70450 CT HEAD/BRAIN W/O DYE: CPT

## 2024-12-29 RX ORDER — ACETAMINOPHEN 325 MG/1
975 TABLET ORAL ONCE
Status: COMPLETED | OUTPATIENT
Start: 2024-12-29 | End: 2024-12-29

## 2024-12-29 RX ADMIN — ACETAMINOPHEN 975 MG: 325 TABLET ORAL at 10:52

## 2024-12-29 NOTE — ED PROVIDER NOTES
Time reflects when diagnosis was documented in both MDM as applicable and the Disposition within this note       Time User Action Codes Description Comment    12/29/2024  1:03 PM Osvaldo Young Add [W19.XXXA] Fall     12/29/2024  1:03 PM Osvaldo Young Add [S62.101A] Right wrist fracture           ED Disposition       ED Disposition   Discharge    Condition   Stable    Date/Time   Sun Dec 29, 2024  1:02 PM    Comment   Anna Ly discharge to home/self care.                   Assessment & Plan       Medical Decision Making  Differential dx includes R wrist fx, R wrist sprain, intracranial injury  Ct head shows no acute intracranial injury  I ordered a R wrist xr  I independently interpreted as impacted comminuted fx distal radius  Sugar tong splint placed by me. Splint is static  Secure chatted orthopedist Dr Salas States agreeable with splint and patient can f/u with him or Dr Queen  Tylenol or motrin for pain   Return precautions reviewed.     Amount and/or Complexity of Data Reviewed  Radiology: ordered.    Risk  OTC drugs.             Medications   acetaminophen (TYLENOL) tablet 975 mg (975 mg Oral Given 12/29/24 1052)       ED Risk Strat Scores                          SBIRT 20yo+      Flowsheet Row Most Recent Value   Initial Alcohol Screen: US AUDIT-C     1. How often do you have a drink containing alcohol? 0 Filed at: 12/29/2024 1036   2. How many drinks containing alcohol do you have on a typical day you are drinking?  0 Filed at: 12/29/2024 1036   3a. Male UNDER 65: How often do you have five or more drinks on one occasion? 0 Filed at: 12/29/2024 1036   3b. FEMALE Any Age, or MALE 65+: How often do you have 4 or more drinks on one occassion? 0 Filed at: 12/29/2024 1036   Audit-C Score 0 Filed at: 12/29/2024 1036   ALISSA: How many times in the past year have you...    Used an illegal drug or used a prescription medication for non-medical reasons? Never Filed at: 12/29/2024 1036                             History of Present Illness       Chief Complaint   Patient presents with    Fall     Pt reports she was attempting to take off shoes yesterday causing her to lose balance and fall. Pt reports back of head strike on table and right wrist injury from attempting to catch self. Noted defomity and bruising in wrist. Pt denies thinners or LOC but reports taking baby aspirin daily.        Past Medical History:   Diagnosis Date    Asthma exacerbation 2024    Cardiac disease     Diabetes mellitus (HCC)     GERD (gastroesophageal reflux disease)     Hypotension 2024    Severe sepsis (HCC) 2024    Tachycardia 2024      Past Surgical History:   Procedure Laterality Date    CARDIAC PACEMAKER PLACEMENT       SECTION      x 2    JOINT REPLACEMENT        Family History   Problem Relation Age of Onset    Parkinsonism Mother     No Known Problems Father     No Known Problems Brother     No Known Problems Maternal Aunt     No Known Problems Maternal Uncle     No Known Problems Paternal Aunt     No Known Problems Paternal Uncle     No Known Problems Maternal Grandmother     No Known Problems Maternal Grandfather     Breast cancer Paternal Grandmother 60    No Known Problems Paternal Grandfather     No Known Problems Daughter     No Known Problems Maternal Aunt     No Known Problems Paternal Aunt     ADD / ADHD Neg Hx     Anesthesia problems Neg Hx     Cancer Neg Hx     Clotting disorder Neg Hx     Collagen disease Neg Hx     Diabetes Neg Hx     Dislocations Neg Hx     Learning disabilities Neg Hx     Neurological problems Neg Hx     Osteoporosis Neg Hx     Rheumatologic disease Neg Hx     Scoliosis Neg Hx     Vascular Disease Neg Hx       Social History     Tobacco Use    Smoking status: Never    Smokeless tobacco: Never   Vaping Use    Vaping status: Never Used   Substance Use Topics    Alcohol use: Yes     Comment: socially    Drug use: No      E-Cigarette/Vaping    E-Cigarette  Use Never User       E-Cigarette/Vaping Substances      I have reviewed and agree with the history as documented.     Patient is a 68 yo wm with history of asthma, DM, GERD who reports mechanical fall 11:30 pm last night. Was standing attempting to take shoe off when she lost her balance and fell backwards. Pt reports striking back of head on coffee table. Takes ASA daily. No loc or headache. No nausea/vomiting. No neck pain or back pain. Reports R wrist pain. States she fell onto outstretched R hand. Pt is right hand dominant. No R hand numbness or tingling. No other complaints         Review of Systems   Respiratory:  Negative for shortness of breath.    Cardiovascular:  Negative for chest pain.   Gastrointestinal:  Negative for abdominal pain, nausea and vomiting.   Musculoskeletal:  Positive for arthralgias and joint swelling.   Neurological:  Negative for dizziness, numbness and headaches.           Objective       ED Triage Vitals [12/29/24 1035]   Temperature Pulse Blood Pressure Respirations SpO2 Patient Position - Orthostatic VS   98 °F (36.7 °C) 74 123/61 17 99 % Sitting      Temp Source Heart Rate Source BP Location FiO2 (%) Pain Score    Oral Monitor Left arm -- 7      Vitals      Date and Time Temp Pulse SpO2 Resp BP Pain Score FACES Pain Rating User   12/29/24 1052 -- -- -- -- -- 7 -- PB   12/29/24 1035 98 °F (36.7 °C) 74 99 % 17 123/61 7 -- CG            Physical Exam  Vitals and nursing note reviewed.   Constitutional:       General: She is not in acute distress.     Appearance: Normal appearance. She is not ill-appearing, toxic-appearing or diaphoretic.   HENT:      Head: Normocephalic and atraumatic.      Right Ear: Tympanic membrane, ear canal and external ear normal.      Left Ear: Tympanic membrane, ear canal and external ear normal.      Nose: Nose normal.      Mouth/Throat:      Mouth: Mucous membranes are moist.      Pharynx: Oropharynx is clear.   Eyes:      Extraocular Movements:  "Extraocular movements intact.      Conjunctiva/sclera: Conjunctivae normal.      Pupils: Pupils are equal, round, and reactive to light.   Cardiovascular:      Rate and Rhythm: Normal rate and regular rhythm.      Pulses: Normal pulses.      Heart sounds: Normal heart sounds.   Pulmonary:      Effort: Pulmonary effort is normal.      Breath sounds: Normal breath sounds.   Abdominal:      General: Abdomen is flat. Bowel sounds are normal.      Palpations: Abdomen is soft.      Tenderness: There is no abdominal tenderness.   Musculoskeletal:      Cervical back: Normal range of motion and neck supple.      Comments: R wrist tenderness, swelling, ecchymoses.    Skin:     General: Skin is warm and dry.      Capillary Refill: Capillary refill takes less than 2 seconds.   Neurological:      General: No focal deficit present.      Mental Status: She is alert and oriented to person, place, and time. Mental status is at baseline.         Results Reviewed       None            CT head without contrast   Final Interpretation by Piyush Lara DO (12/29 1241)      No acute intracranial abnormality.                  Workstation performed: [id number]         XR wrist 3+ views RIGHT    (Results Pending)       Splint application    Date/Time: 12/29/2024 2:17 PM    Performed by: Osvaldo Young PA-C  Authorized by: Osvaldo Young PA-C  Universal Protocol:  procedure performed by consultantConsent: Verbal consent obtained.  Risks and benefits: risks, benefits and alternatives were discussed  Time out: Immediately prior to procedure a \"time out\" was called to verify the correct patient, procedure, equipment, support staff and site/side marked as required.  Timeout called at: 12/29/2024 12:01 PM.  Patient understanding: patient states understanding of the procedure being performed  Patient consent: the patient's understanding of the procedure matches consent given  Procedure consent: procedure consent matches procedure " scheduled  Relevant documents: relevant documents present and verified  Test results: test results available and properly labeled  Site marked: the operative site was marked  Radiology Images displayed and confirmed. If images not available, report reviewed: imaging studies available  Patient identity confirmed: verbally with patient and arm band    Pre-procedure details:     Sensation:  Normal    Skin color:  Normal  Procedure details:     Laterality:  Right    Location:  Wrist    Wrist:  R wrist    Splint type:  Sugar tong    Supplies:  Cotton padding, elastic bandage and Ortho-Glass  Post-procedure details:     Pain:  Improved    Sensation:  Normal    Skin color:  Normal    Patient tolerance of procedure:  Tolerated well, no immediate complications      ED Medication and Procedure Management   Prior to Admission Medications   Prescriptions Last Dose Informant Patient Reported? Taking?   DULoxetine (CYMBALTA) 60 mg delayed release capsule   Yes No   JANUMET  MG per tablet   Yes No   aspirin 81 mg chewable tablet   Yes No   Sig: Chew 81 mg daily.   gabapentin (NEURONTIN) 300 mg capsule   Yes No   losartan (COZAAR) 25 mg tablet  Self Yes No   Sig: Take 25 mg by mouth daily   metoprolol tartrate (LOPRESSOR) 25 mg tablet   No No   Sig: Take 1 tablet (25 mg total) by mouth every 8 (eight) hours   pantoprazole (PROTONIX) 40 mg tablet   Yes No   simvastatin (ZOCOR) 20 mg tablet   Yes No   Sig: Take 20 mg by mouth daily at bedtime.      Facility-Administered Medications: None     Discharge Medication List as of 12/29/2024  1:07 PM        CONTINUE these medications which have NOT CHANGED    Details   aspirin 81 mg chewable tablet Chew 81 mg daily., Until Discontinued, Historical Med      DULoxetine (CYMBALTA) 60 mg delayed release capsule Starting Sun 3/17/2019, Historical Med      gabapentin (NEURONTIN) 300 mg capsule Starting Tue 3/30/2021, Historical Med      JANUMET  MG per tablet Starting Tue 4/9/2019,  Historical Med      losartan (COZAAR) 25 mg tablet Take 25 mg by mouth daily, Historical Med      metoprolol tartrate (LOPRESSOR) 25 mg tablet Take 1 tablet (25 mg total) by mouth every 8 (eight) hours, Starting Mon 9/9/2024, Until Wed 10/9/2024, Normal      pantoprazole (PROTONIX) 40 mg tablet Starting Sat 3/9/2019, Historical Med      simvastatin (ZOCOR) 20 mg tablet Take 20 mg by mouth daily at bedtime., Until Discontinued, Historical Med           No discharge procedures on file.  ED SEPSIS DOCUMENTATION   Time reflects when diagnosis was documented in both MDM as applicable and the Disposition within this note       Time User Action Codes Description Comment    12/29/2024  1:03 PM Osvaldo Young [W19.XXXA] Fall     12/29/2024  1:03 PM Osvaldo Young [S62.101A] Right wrist fracture                  Osvaldo Young PA-C  12/29/24 1427

## 2024-12-29 NOTE — ED NOTES
Remains remains awake and alert, splint and sling intact. Ice pack reapplied. CT result is back and pt to be discharged     Soha Medrano RN  12/29/24 9192

## 2024-12-29 NOTE — DISCHARGE INSTRUCTIONS
Tylenol or ibuprofen may be taken for pain     Elevate     Follow up with orthopedist Dr Salas or Dr Queen for further evaluation. Call Monday for appointment    Return to ED for increased pain, worsening symptoms

## 2024-12-30 ENCOUNTER — APPOINTMENT (OUTPATIENT)
Dept: RADIOLOGY | Facility: CLINIC | Age: 69
End: 2024-12-30
Payer: COMMERCIAL

## 2024-12-30 ENCOUNTER — OFFICE VISIT (OUTPATIENT)
Dept: OBGYN CLINIC | Facility: CLINIC | Age: 69
End: 2024-12-30
Payer: COMMERCIAL

## 2024-12-30 VITALS — HEIGHT: 65 IN | WEIGHT: 208 LBS | BODY MASS INDEX: 34.66 KG/M2

## 2024-12-30 DIAGNOSIS — S52.601S CLOSED FRACTURE OF DISTAL ENDS OF RIGHT RADIUS AND ULNA, SEQUELA: ICD-10-CM

## 2024-12-30 DIAGNOSIS — S52.501S CLOSED FRACTURE OF DISTAL ENDS OF RIGHT RADIUS AND ULNA, SEQUELA: Primary | ICD-10-CM

## 2024-12-30 DIAGNOSIS — S52.501S CLOSED FRACTURE OF DISTAL ENDS OF RIGHT RADIUS AND ULNA, SEQUELA: ICD-10-CM

## 2024-12-30 DIAGNOSIS — S52.601S CLOSED FRACTURE OF DISTAL ENDS OF RIGHT RADIUS AND ULNA, SEQUELA: Primary | ICD-10-CM

## 2024-12-30 PROCEDURE — 73110 X-RAY EXAM OF WRIST: CPT

## 2024-12-30 PROCEDURE — 99204 OFFICE O/P NEW MOD 45 MIN: CPT | Performed by: STUDENT IN AN ORGANIZED HEALTH CARE EDUCATION/TRAINING PROGRAM

## 2024-12-30 RX ORDER — YOHIMBE BARK 500 MG
CAPSULE ORAL DAILY
COMMUNITY

## 2024-12-30 RX ORDER — MULTIVIT WITH MINERALS/LUTEIN
1000 TABLET ORAL DAILY
COMMUNITY

## 2024-12-30 NOTE — PROGRESS NOTES
ORTHOPAEDIC HAND, WRIST, AND ELBOW OFFICE  VISIT     ASSESSMENT/PLAN:    Anna Ly is a 69 y.o. RHD female who presents with right distal radius fracture sustained 12/29/24    X-rays reviewed with patient demonstrates impaction and dorsal angulation. Risks and benefits of conservative versus operative treatments were discussed.   Patient will remain in sugar tongue splint for 1 week. There is a chance that we could lose alignment and potentially require surgery. A this time we will monitor, patient was amenable to this.   Continue with OTC analgesics as needed for pain. No lifting, pushing, or pulling with RUE.             The patient verbalized understanding of exam findings and treatment plan. We engaged in the shared decision-making process and treatment options were discussed at length with the patient. Surgical and conservative management discussed today along with risks and benefits.    Follow Up:  1 week, splint off, repeat x-ray       General Discussions:    Fracture - Nonoperative Care: The physiology of a fractured bone was discussed with the patient today.  With nondisplaced or minimally displaced fractures, conservative treatment often results in a functional recovery.  Typically, these fractures are immobilized in either a cast or splint depending on the pattern.  Radiographs are typically taken at intervals throughout the fracture healing to ensure that muscular forces do not cause loss of reduction or alignment.  If the fracture loses its alignment, surgical intervention may be required to stabilize it.  Medical conditions such as diabetes, osteoporosis, vitamin D deficiency, and a history of or exposure to smoking may delay or prevent fracture healing.      ____________________________________________________________________________________________________________________________________________      CHIEF COMPLAINT:  Right wrist injury     SUBJECTIVE:  Anna Ly is a 69 y.o. female  who presents for initial evaluation of right hand/wrist. Patient fell 24 injuring her wrist. She was seen at ED, where x-rays were obtained demonstrating a distal radius fracture. Patient was placed in a sugar-tongue splint which she presents in today.   Radiation: Yes to the  hand and forearm  Previous Treatments: NSAIDs, Tylenol, activity modification, and bracing with only partial relief  Associated symptoms: Stiffness/LROM  Handedness: right  Work status: retired    I have personally reviewed all the relevant PMH, PSH, SH, FH, Medications and allergies      PAST MEDICAL HISTORY:  Past Medical History:   Diagnosis Date    Asthma exacerbation 2024    Cardiac disease     Diabetes mellitus (HCC)     GERD (gastroesophageal reflux disease)     Hypotension 2024    Severe sepsis (HCC) 2024    Tachycardia 2024       PAST SURGICAL HISTORY:  Past Surgical History:   Procedure Laterality Date    CARDIAC PACEMAKER PLACEMENT       SECTION      x 2    JOINT REPLACEMENT         FAMILY HISTORY:  Family History   Problem Relation Age of Onset    Parkinsonism Mother     No Known Problems Father     No Known Problems Brother     No Known Problems Maternal Aunt     No Known Problems Maternal Uncle     No Known Problems Paternal Aunt     No Known Problems Paternal Uncle     No Known Problems Maternal Grandmother     No Known Problems Maternal Grandfather     Breast cancer Paternal Grandmother 60    No Known Problems Paternal Grandfather     No Known Problems Daughter     No Known Problems Maternal Aunt     No Known Problems Paternal Aunt     ADD / ADHD Neg Hx     Anesthesia problems Neg Hx     Cancer Neg Hx     Clotting disorder Neg Hx     Collagen disease Neg Hx     Diabetes Neg Hx     Dislocations Neg Hx     Learning disabilities Neg Hx     Neurological problems Neg Hx     Osteoporosis Neg Hx     Rheumatologic disease Neg Hx     Scoliosis Neg Hx     Vascular Disease Neg Hx        SOCIAL  HISTORY:  Social History     Tobacco Use    Smoking status: Never    Smokeless tobacco: Never   Vaping Use    Vaping status: Never Used   Substance Use Topics    Alcohol use: Yes     Comment: socially    Drug use: No       MEDICATIONS:    Current Outpatient Medications:     Ascorbic Acid (vitamin C) 1000 MG tablet, Take 1,000 mg by mouth daily, Disp: , Rfl:     aspirin 81 mg chewable tablet, Chew 81 mg daily., Disp: , Rfl:     cyanocobalamin (VITAMIN B-12) 2000 MCG tablet, Take 2,000 mcg by mouth daily, Disp: , Rfl:     DULoxetine (CYMBALTA) 60 mg delayed release capsule, , Disp: , Rfl:     Empagliflozin (Jardiance) 25 MG TABS, Take 25 mg by mouth every morning, Disp: , Rfl:     gabapentin (NEURONTIN) 300 mg capsule, , Disp: , Rfl:     JANUMET  MG per tablet, , Disp: , Rfl:     Lactobacillus (Acidophilus) 100 MG CAPS, Take by mouth in the morning, Disp: , Rfl:     losartan (COZAAR) 25 mg tablet, Take 25 mg by mouth daily, Disp: , Rfl:     pantoprazole (PROTONIX) 40 mg tablet, , Disp: , Rfl:     simvastatin (ZOCOR) 20 mg tablet, Take 20 mg by mouth daily at bedtime., Disp: , Rfl:     metoprolol tartrate (LOPRESSOR) 25 mg tablet, Take 1 tablet (25 mg total) by mouth every 8 (eight) hours, Disp: 90 tablet, Rfl: 0    ALLERGIES:  Allergies   Allergen Reactions    Codeine GI Intolerance    Morphine     Penicillins Other (See Comments)     unknown           REVIEW OF SYSTEMS:  Pertinent items are noted in HPI.  A comprehensive review of systems was negative.    VITALS:  There were no vitals filed for this visit.    LABS:  HgA1c:   Lab Results   Component Value Date    HGBA1C 7.0 (H) 09/05/2024     BMP:   Lab Results   Component Value Date    CALCIUM 8.6 09/09/2024    K 4.1 09/09/2024    CO2 22 09/09/2024     09/09/2024    BUN 8 09/09/2024    CREATININE 1.01 09/09/2024       _____________________________________________________  PHYSICAL EXAMINATION:  General: well developed and well nourished, alert, oriented  times 3, and appears comfortable  Psychiatric: Normal  HEENT: Normocephalic, Atraumatic Trachea Midline, No torticollis  Pulmonary: No audible wheezing or respiratory distress   Abdomen/GI: Non tender, non distended   Cardiovascular: Regular Rate and Rhythm. No pitting edema, 2+ radial pulse   Skin: No masses, erythema, lacerations, fluctation, ulcerations  Neurovascular: Sensation Intact to the Median, Ulnar, Radial Nerve, Motor Intact to the Median, Ulnar, Radial Nerve, and Pulses Intact  Musculoskeletal: Normal, except as noted in detailed exam and in HPI.        FOCUSED MUSCULOSKELETAL EXAMINATION:  Right Upper Extremity  Inspection: skin intact, no notable deformity   Palpation:   Neurologic: 5/5 elbow flexion, 5/5 elbow extension, 5/5 wrist extension, 5/5 wrist flexion, 5/5 finger flexion, 5/5 finger extension, 5/5 FPL, 5/5 EPL, 5/5 APB, 5/5 intrinsics, sensation intact to median, radial, and ulnar nerve distributions  Vascular: Palpable radial pulse, brisk cap refill <2sec, hand warm and well perfused  MSK:   Exam limited due to splint  Swelling and ecchymosis to fingers  Full AROM to digits   Sensation intact   Fingers well perfused       ___________________________________________________  STUDIES REVIEWED:  Xrays of the right wrist were obtained on 12/30/24 were independently reviewed which demonstrates impacted distal radius fracture with dorsal angulation       LABS REVIEWED:    HgA1c:   Lab Results   Component Value Date    HGBA1C 7.0 (H) 09/05/2024     BMP:   Lab Results   Component Value Date    CALCIUM 8.6 09/09/2024    K 4.1 09/09/2024    CO2 22 09/09/2024     09/09/2024    BUN 8 09/09/2024    CREATININE 1.01 09/09/2024               PROCEDURES PERFORMED:  Procedures  No Procedures performed today    _____________________________________________________      Scribe Attestation      I,:  Dara McCrone am acting as a scribe while in the presence of the attending physician.:       I,:  Gildardo  Damian Queen MD personally performed the services described in this documentation    as scribed in my presence.:               I agree with the history, physical examination, assessment and plan of care as documented above.    Gildardo Queen M.D.  Attending, Orthopaedic Surgery  Hand, Wrist, and Elbow Surgery  [facility]

## 2025-01-06 ENCOUNTER — OFFICE VISIT (OUTPATIENT)
Dept: OBGYN CLINIC | Facility: CLINIC | Age: 70
End: 2025-01-06
Payer: COMMERCIAL

## 2025-01-06 ENCOUNTER — APPOINTMENT (OUTPATIENT)
Dept: RADIOLOGY | Facility: CLINIC | Age: 70
End: 2025-01-06
Payer: COMMERCIAL

## 2025-01-06 VITALS — BODY MASS INDEX: 34.66 KG/M2 | HEIGHT: 65 IN | WEIGHT: 208 LBS

## 2025-01-06 DIAGNOSIS — S52.501S CLOSED FRACTURE OF DISTAL ENDS OF RIGHT RADIUS AND ULNA, SEQUELA: Primary | ICD-10-CM

## 2025-01-06 DIAGNOSIS — S52.501S CLOSED FRACTURE OF DISTAL ENDS OF RIGHT RADIUS AND ULNA, SEQUELA: ICD-10-CM

## 2025-01-06 DIAGNOSIS — S52.601S CLOSED FRACTURE OF DISTAL ENDS OF RIGHT RADIUS AND ULNA, SEQUELA: Primary | ICD-10-CM

## 2025-01-06 DIAGNOSIS — S52.601S CLOSED FRACTURE OF DISTAL ENDS OF RIGHT RADIUS AND ULNA, SEQUELA: ICD-10-CM

## 2025-01-06 PROCEDURE — 73110 X-RAY EXAM OF WRIST: CPT

## 2025-01-06 PROCEDURE — 25600 CLTX DST RDL FX/EPHYS SEP WO: CPT | Performed by: STUDENT IN AN ORGANIZED HEALTH CARE EDUCATION/TRAINING PROGRAM

## 2025-01-06 PROCEDURE — 99214 OFFICE O/P EST MOD 30 MIN: CPT | Performed by: STUDENT IN AN ORGANIZED HEALTH CARE EDUCATION/TRAINING PROGRAM

## 2025-01-06 NOTE — PROGRESS NOTES
ORTHOPAEDIC HAND, WRIST, AND ELBOW OFFICE  VISIT     ASSESSMENT/PLAN:    Anna Ly is a 69 y.o. RHD female who presents with right distal radius fracture sustained 12/29/24    Sugar-tong splint removed, repeat x-rays obtained demonstrate stable fracture in maintained alignment.   Patient placed in short arm cast and directed on cast care.   We discussed treatment options including conservative with a cast versus surgery.  We discussed that conservative treatment would include maintaining a short arm cast and having the patient follow-up weekly with x-rays to ensure no displacement.  Total time immobilization would be 6 weeks at which time patient would have to start therapy to work on range of motion and strengthening.  We discussed that surgery would involve an open reduction internal fixation with a volar plate and screws.  We discussed that patients treated surgically are allowed to start range of motion sooner and tend to have improved  strength but that outcomes at 1 year are equivalent between surgery and no surgery.  We also reviewed risk of EPL rupture with nonoperatively treated distal radius fractures. The patient is not interested in surgery and would like to continue treatment in a cast. We will place patient in a cast and bring back in 1 week for repeat alignment check. Patient expressed full understanding of plan, all questions answered.  Continue no heavy lifting, pushing, or pulling with RUE.         The patient verbalized understanding of exam findings and treatment plan. We engaged in the shared decision-making process and treatment options were discussed at length with the patient. Surgical and conservative management discussed today along with risks and benefits.    Follow Up:  1 week - repeat x-ray in cast     ____________________________________________________________________________________________________________________________________________      CHIEF COMPLAINT:  Right wrist  pain     SUBJECTIVE:  Anna Ly is a 69 y.o. female who presents for follow up evaluation of right distal radius fracture sustained 24. She has been in a sugar tong splint for a week, placed by ED. She notes continued aching pain to the wrist but states over all pain is controlled. She has attempted gentle finger range of motion. Denies numbness or paresthesias.     I have personally reviewed all the relevant PMH, PSH, SH, FH, Medications and allergies      PAST MEDICAL HISTORY:  Past Medical History:   Diagnosis Date    Asthma exacerbation 2024    Cardiac disease     Diabetes mellitus (HCC)     GERD (gastroesophageal reflux disease)     Hypotension 2024    Severe sepsis (HCC) 2024    Tachycardia 2024       PAST SURGICAL HISTORY:  Past Surgical History:   Procedure Laterality Date    CARDIAC PACEMAKER PLACEMENT       SECTION      x 2    JOINT REPLACEMENT         FAMILY HISTORY:  Family History   Problem Relation Age of Onset    Parkinsonism Mother     No Known Problems Father     No Known Problems Brother     No Known Problems Maternal Aunt     No Known Problems Maternal Uncle     No Known Problems Paternal Aunt     No Known Problems Paternal Uncle     No Known Problems Maternal Grandmother     No Known Problems Maternal Grandfather     Breast cancer Paternal Grandmother 60    No Known Problems Paternal Grandfather     No Known Problems Daughter     No Known Problems Maternal Aunt     No Known Problems Paternal Aunt     ADD / ADHD Neg Hx     Anesthesia problems Neg Hx     Cancer Neg Hx     Clotting disorder Neg Hx     Collagen disease Neg Hx     Diabetes Neg Hx     Dislocations Neg Hx     Learning disabilities Neg Hx     Neurological problems Neg Hx     Osteoporosis Neg Hx     Rheumatologic disease Neg Hx     Scoliosis Neg Hx     Vascular Disease Neg Hx        SOCIAL HISTORY:  Social History     Tobacco Use    Smoking status: Never    Smokeless tobacco: Never   Vaping Use     Vaping status: Never Used   Substance Use Topics    Alcohol use: Yes     Comment: socially    Drug use: No       MEDICATIONS:    Current Outpatient Medications:     Ascorbic Acid (vitamin C) 1000 MG tablet, Take 1,000 mg by mouth daily, Disp: , Rfl:     aspirin 81 mg chewable tablet, Chew 81 mg daily., Disp: , Rfl:     cyanocobalamin (VITAMIN B-12) 2000 MCG tablet, Take 2,000 mcg by mouth daily, Disp: , Rfl:     DULoxetine (CYMBALTA) 60 mg delayed release capsule, , Disp: , Rfl:     Empagliflozin (Jardiance) 25 MG TABS, Take 25 mg by mouth every morning, Disp: , Rfl:     gabapentin (NEURONTIN) 300 mg capsule, , Disp: , Rfl:     JANUMET  MG per tablet, , Disp: , Rfl:     Lactobacillus (Acidophilus) 100 MG CAPS, Take by mouth in the morning, Disp: , Rfl:     losartan (COZAAR) 25 mg tablet, Take 25 mg by mouth daily, Disp: , Rfl:     pantoprazole (PROTONIX) 40 mg tablet, , Disp: , Rfl:     simvastatin (ZOCOR) 20 mg tablet, Take 20 mg by mouth daily at bedtime., Disp: , Rfl:     metoprolol tartrate (LOPRESSOR) 25 mg tablet, Take 1 tablet (25 mg total) by mouth every 8 (eight) hours, Disp: 90 tablet, Rfl: 0    ALLERGIES:  Allergies   Allergen Reactions    Codeine GI Intolerance    Morphine     Penicillins Other (See Comments)     unknown           REVIEW OF SYSTEMS:  Pertinent items are noted in HPI.  A comprehensive review of systems was negative.    VITALS:  There were no vitals filed for this visit.    LABS:  HgA1c:   Lab Results   Component Value Date    HGBA1C 7.0 (H) 09/05/2024     BMP:   Lab Results   Component Value Date    CALCIUM 8.6 09/09/2024    K 4.1 09/09/2024    CO2 22 09/09/2024     09/09/2024    BUN 8 09/09/2024    CREATININE 1.01 09/09/2024       _____________________________________________________  PHYSICAL EXAMINATION:  General: well developed and well nourished, alert, oriented times 3, and appears comfortable  Psychiatric: Normal  HEENT: Normocephalic, Atraumatic Trachea Midline, No  torticollis  Pulmonary: No audible wheezing or respiratory distress   Abdomen/GI: Non tender, non distended   Cardiovascular: Regular Rate and Rhythm. No pitting edema, 2+ radial pulse   Skin: No masses, erythema, lacerations, fluctation, ulcerations  Neurovascular: Sensation Intact to the Median, Ulnar, Radial Nerve, Motor Intact to the Median, Ulnar, Radial Nerve, and Pulses Intact  Musculoskeletal: Normal, except as noted in detailed exam and in HPI.        FOCUSED MUSCULOSKELETAL EXAMINATION:  Right Upper Extremity  Inspection: skin intact, sweling and ecchymosis at wrist  Palpation:  ttp at wrist with palpable creptis  Neurologic: 5/5 elbow flexion, 5/5 elbow extension, nt/5 wrist extension, nt/5 wrist flexion, 3/5 finger flexion, 3/5 finger extension, 5/5 FPL, 5/5 EPL, 5/5 APB, 5/5 intrinsics, sensation intact to median, radial, and ulnar nerve distributions  Vascular: Palpable radial pulse, brisk cap refill <2sec, hand warm and well perfused  MSK:   Mild generalized swelling to wrist and hand   TTP distal radius   AROM to be expected based on pain and immobilization   Fingers well perfused   Sensation intact      ___________________________________________________  STUDIES REVIEWED:  Xrays of the right wrist were obtained on 1/6/25 were independently reviewed which demonstrates stable distal radius fracture with dorsal angulation in maintained alignment compared to previous radiographs      LABS REVIEWED:    HgA1c:   Lab Results   Component Value Date    HGBA1C 7.0 (H) 09/05/2024     BMP:   Lab Results   Component Value Date    CALCIUM 8.6 09/09/2024    K 4.1 09/09/2024    CO2 22 09/09/2024     09/09/2024    BUN 8 09/09/2024    CREATININE 1.01 09/09/2024               PROCEDURES PERFORMED:  Fracture / Dislocation Treatment    Date/Time: 1/6/2025 1:30 PM    Performed by: Gildardo Queen MD  Authorized by: Gildardo Queen MD    Patient Location:  Cl[facility] Protocol:  procedure  performed by consultantConsent: Verbal consent obtained.  Risks and benefits: risks, benefits and alternatives were discussed  Consent given by: patient  Timeout called at: 1/6/2025 1:46 PM.  Patient understanding: patient states understanding of the procedure being performed  Patient identity confirmed: verbally with patient    Injury location:  Wrist  Location details:  Right wrist  Injury type:  Fracture  Fracture type: distal radius    Fracture type: distal radius    Neurovascular status: Neurovascularly intact    Local anesthesia used?: No    General anesthesia used?: No    Manipulation performed?: No    Immobilization:  Cast  Cast type:  Short arm  Supplies used:  Cotton padding and fiberglass  Neurovascular status: Neurovascularly intact    Distal perfusion: normal    Neurological function: normal    Range of motion: unchanged     We discussed the risks and benefits of surgical vs. non operative treatment. Risks of operative management include but are not limited to infection, wound breakdown, etc. Risks of non-operative management include malunion/nonunion. After engaging in shared decision making we will proceed with non operative treatment. Today I will initiate closed treatment of this distal radius fracture. A short arm cast was applied today. The patient will be 5 weight bearing until follow up in 1 week whereby repeat X-Rays will be obtained of the affected bone.                _____________________________________________________      Scribe Attestation      I,:  Dara Valerio am acting as a scribe while in the presence of the attending physician.:       I,:  Gildardo Queen MD personally performed the services described in this documentation    as scribed in my presence.:               I agree with the history, physical examination, assessment and plan of care as documented above.    Gildardo Queen M.D.  Attending, Orthopaedic Surgery  Hand, Wrist, and Elbow Surgery  [facility]

## 2025-01-13 ENCOUNTER — OFFICE VISIT (OUTPATIENT)
Dept: OBGYN CLINIC | Facility: CLINIC | Age: 70
End: 2025-01-13

## 2025-01-13 ENCOUNTER — APPOINTMENT (OUTPATIENT)
Dept: RADIOLOGY | Facility: CLINIC | Age: 70
End: 2025-01-13
Payer: COMMERCIAL

## 2025-01-13 VITALS — WEIGHT: 208 LBS | BODY MASS INDEX: 34.66 KG/M2 | HEIGHT: 65 IN

## 2025-01-13 DIAGNOSIS — S52.501S CLOSED FRACTURE OF DISTAL ENDS OF RIGHT RADIUS AND ULNA, SEQUELA: Primary | ICD-10-CM

## 2025-01-13 DIAGNOSIS — S52.501S CLOSED FRACTURE OF DISTAL ENDS OF RIGHT RADIUS AND ULNA, SEQUELA: ICD-10-CM

## 2025-01-13 DIAGNOSIS — S52.601S CLOSED FRACTURE OF DISTAL ENDS OF RIGHT RADIUS AND ULNA, SEQUELA: ICD-10-CM

## 2025-01-13 DIAGNOSIS — S52.601S CLOSED FRACTURE OF DISTAL ENDS OF RIGHT RADIUS AND ULNA, SEQUELA: Primary | ICD-10-CM

## 2025-01-13 PROCEDURE — 73110 X-RAY EXAM OF WRIST: CPT

## 2025-01-13 PROCEDURE — 99024 POSTOP FOLLOW-UP VISIT: CPT | Performed by: STUDENT IN AN ORGANIZED HEALTH CARE EDUCATION/TRAINING PROGRAM

## 2025-01-13 NOTE — PROGRESS NOTES
ORTHOPAEDIC HAND, WRIST, AND ELBOW OFFICE  VISIT     ASSESSMENT/PLAN:    Anna Ly is a 69 y.o. RHD female who presents with right distal radius fracture sustained 24    Xrays in cast obtained today demonstrates table alignment without complication. Plan to continue with cast treatment. Will follow up in 4 weeks for cast off repeat xrays    Continue no heavy lifting, pushing, or pulling with RUE.         The patient verbalized understanding of exam findings and treatment plan. We engaged in the shared decision-making process and treatment options were discussed at length with the patient. Surgical and conservative management discussed today along with risks and benefits.    Follow Up:  4 weeks cast off xrays    ____________________________________________________________________________________________________________________________________________      CHIEF COMPLAINT:  Right wrist pain     SUBJECTIVE:  Anna Ly is a 69 y.o. female who presents for follow up evaluation of right distal radius fracture sustained 24. Patient placed in cast last week and presents today for repeat alignment check.    I have personally reviewed all the relevant PMH, PSH, SH, FH, Medications and allergies      PAST MEDICAL HISTORY:  Past Medical History:   Diagnosis Date    Asthma exacerbation 2024    Cardiac disease     Diabetes mellitus (HCC)     GERD (gastroesophageal reflux disease)     Hypotension 2024    Severe sepsis (HCC) 2024    Tachycardia 2024       PAST SURGICAL HISTORY:  Past Surgical History:   Procedure Laterality Date    CARDIAC PACEMAKER PLACEMENT       SECTION      x 2    JOINT REPLACEMENT         FAMILY HISTORY:  Family History   Problem Relation Age of Onset    Parkinsonism Mother     No Known Problems Father     No Known Problems Brother     No Known Problems Maternal Aunt     No Known Problems Maternal Uncle     No Known Problems Paternal Aunt     No Known  Problems Paternal Uncle     No Known Problems Maternal Grandmother     No Known Problems Maternal Grandfather     Breast cancer Paternal Grandmother 60    No Known Problems Paternal Grandfather     No Known Problems Daughter     No Known Problems Maternal Aunt     No Known Problems Paternal Aunt     ADD / ADHD Neg Hx     Anesthesia problems Neg Hx     Cancer Neg Hx     Clotting disorder Neg Hx     Collagen disease Neg Hx     Diabetes Neg Hx     Dislocations Neg Hx     Learning disabilities Neg Hx     Neurological problems Neg Hx     Osteoporosis Neg Hx     Rheumatologic disease Neg Hx     Scoliosis Neg Hx     Vascular Disease Neg Hx        SOCIAL HISTORY:  Social History     Tobacco Use    Smoking status: Never    Smokeless tobacco: Never   Vaping Use    Vaping status: Never Used   Substance Use Topics    Alcohol use: Yes     Comment: socially    Drug use: No       MEDICATIONS:    Current Outpatient Medications:     Ascorbic Acid (vitamin C) 1000 MG tablet, Take 1,000 mg by mouth daily, Disp: , Rfl:     aspirin 81 mg chewable tablet, Chew 81 mg daily., Disp: , Rfl:     cyanocobalamin (VITAMIN B-12) 2000 MCG tablet, Take 2,000 mcg by mouth daily, Disp: , Rfl:     DULoxetine (CYMBALTA) 60 mg delayed release capsule, , Disp: , Rfl:     Empagliflozin (Jardiance) 25 MG TABS, Take 25 mg by mouth every morning, Disp: , Rfl:     gabapentin (NEURONTIN) 300 mg capsule, , Disp: , Rfl:     JANUMET  MG per tablet, , Disp: , Rfl:     Lactobacillus (Acidophilus) 100 MG CAPS, Take by mouth in the morning, Disp: , Rfl:     losartan (COZAAR) 25 mg tablet, Take 25 mg by mouth daily, Disp: , Rfl:     pantoprazole (PROTONIX) 40 mg tablet, , Disp: , Rfl:     simvastatin (ZOCOR) 20 mg tablet, Take 20 mg by mouth daily at bedtime., Disp: , Rfl:     metoprolol tartrate (LOPRESSOR) 25 mg tablet, Take 1 tablet (25 mg total) by mouth every 8 (eight) hours, Disp: 90 tablet, Rfl: 0    ALLERGIES:  Allergies   Allergen Reactions    Codeine  GI Intolerance    Morphine     Penicillins Other (See Comments)     unknown           REVIEW OF SYSTEMS:  Pertinent items are noted in HPI.  A comprehensive review of systems was negative.    VITALS:  There were no vitals filed for this visit.    LABS:  HgA1c:   Lab Results   Component Value Date    HGBA1C 7.0 (H) 09/05/2024     BMP:   Lab Results   Component Value Date    CALCIUM 8.6 09/09/2024    K 4.1 09/09/2024    CO2 22 09/09/2024     09/09/2024    BUN 8 09/09/2024    CREATININE 1.01 09/09/2024       _____________________________________________________  PHYSICAL EXAMINATION:  General: well developed and well nourished, alert, oriented times 3, and appears comfortable  Psychiatric: Normal  HEENT: Normocephalic, Atraumatic Trachea Midline, No torticollis  Pulmonary: No audible wheezing or respiratory distress   Abdomen/GI: Non tender, non distended   Cardiovascular: Regular Rate and Rhythm. No pitting edema, 2+ radial pulse   Skin: No masses, erythema, lacerations, fluctation, ulcerations  Neurovascular: Sensation Intact to the Median, Ulnar, Radial Nerve, Motor Intact to the Median, Ulnar, Radial Nerve, and Pulses Intact  Musculoskeletal: Normal, except as noted in detailed exam and in HPI.        FOCUSED MUSCULOSKELETAL EXAMINATION:  Right Upper Extremity  Inspection: skin intact, sweling and ecchymosis at wrist  Palpation:  ttp at wrist with palpable creptis  Neurologic: 5/5 elbow flexion, 5/5 elbow extension, nt/5 wrist extension, nt/5 wrist flexion, 3/5 finger flexion, 3/5 finger extension, 5/5 FPL, 5/5 EPL, 5/5 APB, 5/5 intrinsics, sensation intact to median, radial, and ulnar nerve distributions  Vascular: Palpable radial pulse, brisk cap refill <2sec, hand warm and well perfused  MSK:   Mild generalized swelling to wrist and hand   TTP distal radius   AROM to be expected based on pain and immobilization   Fingers well perfused   Sensation  intact      ___________________________________________________  STUDIES REVIEWED:  Xrays of the right wrist were obtained on 1/6/25 were independently reviewed which demonstrates stable distal radius fracture with dorsal angulation in maintained alignment compared to previous radiographs      LABS REVIEWED:    HgA1c:   Lab Results   Component Value Date    HGBA1C 7.0 (H) 09/05/2024     BMP:   Lab Results   Component Value Date    CALCIUM 8.6 09/09/2024    K 4.1 09/09/2024    CO2 22 09/09/2024     09/09/2024    BUN 8 09/09/2024    CREATININE 1.01 09/09/2024               PROCEDURES PERFORMED:         ____________________________________________________        I agree with the history, physical examination, assessment and plan of care as documented above.    Gildardo Queen M.D.  Attending, Orthopaedic Surgery  Hand, Wrist, and Elbow Surgery  [facility]

## 2025-02-14 ENCOUNTER — OFFICE VISIT (OUTPATIENT)
Dept: OCCUPATIONAL THERAPY | Facility: CLINIC | Age: 70
End: 2025-02-14
Payer: COMMERCIAL

## 2025-02-14 ENCOUNTER — APPOINTMENT (OUTPATIENT)
Dept: RADIOLOGY | Facility: CLINIC | Age: 70
End: 2025-02-14
Payer: COMMERCIAL

## 2025-02-14 ENCOUNTER — OFFICE VISIT (OUTPATIENT)
Dept: OBGYN CLINIC | Facility: CLINIC | Age: 70
End: 2025-02-14
Payer: COMMERCIAL

## 2025-02-14 DIAGNOSIS — S52.601S CLOSED FRACTURE OF DISTAL ENDS OF RIGHT RADIUS AND ULNA, SEQUELA: ICD-10-CM

## 2025-02-14 DIAGNOSIS — S52.501S CLOSED FRACTURE OF DISTAL ENDS OF RIGHT RADIUS AND ULNA, SEQUELA: ICD-10-CM

## 2025-02-14 DIAGNOSIS — S52.601S CLOSED FRACTURE OF DISTAL ENDS OF RIGHT RADIUS AND ULNA, SEQUELA: Primary | ICD-10-CM

## 2025-02-14 DIAGNOSIS — S52.501D CLOSED FRACTURE OF DISTAL END OF RIGHT RADIUS WITH ROUTINE HEALING, UNSPECIFIED FRACTURE MORPHOLOGY, SUBSEQUENT ENCOUNTER: Primary | ICD-10-CM

## 2025-02-14 DIAGNOSIS — S52.501D CLOSED FRACTURE OF DISTAL ENDS OF RIGHT RADIUS AND ULNA WITH ROUTINE HEALING, SUBSEQUENT ENCOUNTER: ICD-10-CM

## 2025-02-14 DIAGNOSIS — S52.601D CLOSED FRACTURE OF DISTAL ENDS OF RIGHT RADIUS AND ULNA WITH ROUTINE HEALING, SUBSEQUENT ENCOUNTER: ICD-10-CM

## 2025-02-14 DIAGNOSIS — S52.501S CLOSED FRACTURE OF DISTAL ENDS OF RIGHT RADIUS AND ULNA, SEQUELA: Primary | ICD-10-CM

## 2025-02-14 PROCEDURE — 73110 X-RAY EXAM OF WRIST: CPT

## 2025-02-14 PROCEDURE — 99214 OFFICE O/P EST MOD 30 MIN: CPT | Performed by: STUDENT IN AN ORGANIZED HEALTH CARE EDUCATION/TRAINING PROGRAM

## 2025-02-14 NOTE — PROGRESS NOTES
ORTHOPAEDIC HAND, WRIST, AND ELBOW OFFICE  VISIT     ASSESSMENT/PLAN:    Anna Ly is a 69 y.o. RHD female who presents with right distal radius fracture sustained 24    Xrays obtained today demonstrates stable alignment without complication.   Discontinue cast at this time. Transition to custom fabricated splint from OT  OT referral provided today  Continue no heavy lifting, pushing, or pulling with RUE.       The patient verbalized understanding of exam findings and treatment plan. We engaged in the shared decision-making process and treatment options were discussed at length with the patient. Surgical and conservative management discussed today along with risks and benefits.    Follow Up:  6 weeks with repeat X-rays of right wrist    ____________________________________________________________________________________________________________________________________________      CHIEF COMPLAINT:  Right wrist pain     SUBJECTIVE:  Anna Ly is a 69 y.o. female who presents for follow up evaluation of right distal radius fracture sustained 24. Patient presents in an intact short arm cast. She denies experiencing significant pain about the right wrist.    I have personally reviewed all the relevant PMH, PSH, SH, FH, Medications and allergies      PAST MEDICAL HISTORY:  Past Medical History:   Diagnosis Date    Asthma exacerbation 2024    Cardiac disease     Diabetes mellitus (HCC)     GERD (gastroesophageal reflux disease)     Hypotension 2024    Severe sepsis (HCC) 2024    Tachycardia 2024       PAST SURGICAL HISTORY:  Past Surgical History:   Procedure Laterality Date    CARDIAC PACEMAKER PLACEMENT       SECTION      x 2    JOINT REPLACEMENT         FAMILY HISTORY:  Family History   Problem Relation Age of Onset    Parkinsonism Mother     No Known Problems Father     No Known Problems Brother     No Known Problems Maternal Aunt     No Known Problems  Maternal Uncle     No Known Problems Paternal Aunt     No Known Problems Paternal Uncle     No Known Problems Maternal Grandmother     No Known Problems Maternal Grandfather     Breast cancer Paternal Grandmother 60    No Known Problems Paternal Grandfather     No Known Problems Daughter     No Known Problems Maternal Aunt     No Known Problems Paternal Aunt     ADD / ADHD Neg Hx     Anesthesia problems Neg Hx     Cancer Neg Hx     Clotting disorder Neg Hx     Collagen disease Neg Hx     Diabetes Neg Hx     Dislocations Neg Hx     Learning disabilities Neg Hx     Neurological problems Neg Hx     Osteoporosis Neg Hx     Rheumatologic disease Neg Hx     Scoliosis Neg Hx     Vascular Disease Neg Hx        SOCIAL HISTORY:  Social History     Tobacco Use    Smoking status: Never    Smokeless tobacco: Never   Vaping Use    Vaping status: Never Used   Substance Use Topics    Alcohol use: Yes     Comment: socially    Drug use: No       MEDICATIONS:    Current Outpatient Medications:     Ascorbic Acid (vitamin C) 1000 MG tablet, Take 1,000 mg by mouth daily, Disp: , Rfl:     aspirin 81 mg chewable tablet, Chew 81 mg daily., Disp: , Rfl:     cyanocobalamin (VITAMIN B-12) 2000 MCG tablet, Take 2,000 mcg by mouth daily, Disp: , Rfl:     DULoxetine (CYMBALTA) 60 mg delayed release capsule, , Disp: , Rfl:     Empagliflozin (Jardiance) 25 MG TABS, Take 25 mg by mouth every morning, Disp: , Rfl:     gabapentin (NEURONTIN) 300 mg capsule, , Disp: , Rfl:     JANUMET  MG per tablet, , Disp: , Rfl:     Lactobacillus (Acidophilus) 100 MG CAPS, Take by mouth in the morning, Disp: , Rfl:     losartan (COZAAR) 25 mg tablet, Take 25 mg by mouth daily, Disp: , Rfl:     pantoprazole (PROTONIX) 40 mg tablet, , Disp: , Rfl:     simvastatin (ZOCOR) 20 mg tablet, Take 20 mg by mouth daily at bedtime., Disp: , Rfl:     metoprolol tartrate (LOPRESSOR) 25 mg tablet, Take 1 tablet (25 mg total) by mouth every 8 (eight) hours, Disp: 90 tablet,  Rfl: 0    ALLERGIES:  Allergies   Allergen Reactions    Codeine GI Intolerance    Morphine     Penicillins Other (See Comments)     unknown           REVIEW OF SYSTEMS:  Pertinent items are noted in HPI.  A comprehensive review of systems was negative.    VITALS:  There were no vitals filed for this visit.    LABS:  HgA1c:   Lab Results   Component Value Date    HGBA1C 7.0 (H) 09/05/2024     BMP:   Lab Results   Component Value Date    CALCIUM 8.6 09/09/2024    K 4.1 09/09/2024    CO2 22 09/09/2024     09/09/2024    BUN 8 09/09/2024    CREATININE 1.01 09/09/2024       _____________________________________________________  PHYSICAL EXAMINATION:  General: well developed and well nourished, alert, oriented times 3, and appears comfortable  Psychiatric: Normal  HEENT: Normocephalic, Atraumatic Trachea Midline, No torticollis  Pulmonary: No audible wheezing or respiratory distress   Abdomen/GI: Non tender, non distended   Cardiovascular: Regular Rate and Rhythm. No pitting edema, 2+ radial pulse   Skin: No masses, erythema, lacerations, fluctation, ulcerations  Neurovascular: Sensation Intact to the Median, Ulnar, Radial Nerve, Motor Intact to the Median, Ulnar, Radial Nerve, and Pulses Intact  Musculoskeletal: Normal, except as noted in detailed exam and in HPI.        FOCUSED MUSCULOSKELETAL EXAMINATION:  Right Upper Extremity  Inspection: skin intact, sweling and ecchymosis at wrist  Palpation:  No TTP  Neurologic: 5/5 elbow flexion, 5/5 elbow extension, nt/5 wrist extension, nt/5 wrist flexion, 3/5 finger flexion, 3/5 finger extension, 5/5 FPL, 5/5 EPL, 5/5 APB, 5/5 intrinsics, sensation intact to median, radial, and ulnar nerve distributions  Vascular: Palpable radial pulse, brisk cap refill <2sec, hand warm and well perfused  MSK:   Mild generalized swelling to wrist and hand   TTP distal radius   AROM to be expected based on pain and immobilization   Fingers well perfused   Sensation  intact      ___________________________________________________  STUDIES REVIEWED:  Xrays of the right wrist were obtained on 1/6/25 were independently reviewed which demonstrates stable distal radius fracture with dorsal angulation in maintained alignment compared to previous radiographs    Xrays of the right wrist were obtained on 2/14/25 were independently reviewed which demonstrates stable distal radius fracture with dorsal angulation in maintained alignment compared to previous radiographs. New callus formation appreciated.    PROCEDURES PERFORMED:  No procedures performed today.    ____________________________________________________    Scribe Attestation      I,:  Daja Flower am acting as a scribe while in the presence of the attending physician.:       I,:  Gildardo Queen MD personally performed the services described in this documentation    as scribed in my presence.:              I agree with the history, physical examination, assessment and plan of care as documented above.    Gildardo Queen M.D.  Attending, Orthopaedic Surgery  Hand, Wrist, and Elbow Surgery  [facility]

## 2025-02-15 PROCEDURE — L3906 WHO W/O JOINTS CF: HCPCS

## 2025-02-15 NOTE — PROGRESS NOTES
Orthosis    Diagnosis:   1. Closed fracture of distal end of right radius with routine healing, unspecified fracture morphology, subsequent encounter        2. Closed fracture of distal ends of right radius and ulna with routine healing, subsequent encounter          Indication: Fracture    Location: Right  wrist  Supplies: Custom Fit Orthotic  Orthosis type: Volar Hand-Wrist  Wearing Schedule: Remove for hygiene only and Remove for HEP  Describe Position: Neutral to slight extension     Precautions: Fracture and Universal (skin contact/breakdown)    Patient or Caregiver expresses understanding of wearing Schedule and Precautions? Yes  Patient or Caregiver able to don/doff orthotic independently?Yes    Written orders provided to patient? No  Orders Obtained: Written  Orders Obtained from: Dr. Queen    Return for evaluation and treatment Yes formal OT eval 2/17/25.

## 2025-02-17 ENCOUNTER — EVALUATION (OUTPATIENT)
Dept: OCCUPATIONAL THERAPY | Facility: CLINIC | Age: 70
End: 2025-02-17
Payer: COMMERCIAL

## 2025-02-17 DIAGNOSIS — S52.601S CLOSED FRACTURE OF DISTAL ENDS OF RIGHT RADIUS AND ULNA, SEQUELA: Primary | ICD-10-CM

## 2025-02-17 DIAGNOSIS — S52.501S CLOSED FRACTURE OF DISTAL ENDS OF RIGHT RADIUS AND ULNA, SEQUELA: Primary | ICD-10-CM

## 2025-02-17 PROCEDURE — 97166 OT EVAL MOD COMPLEX 45 MIN: CPT

## 2025-02-17 NOTE — PROGRESS NOTES
OT Evaluation     Today's date: 2025  Patient name: Anna Ly  : 1955  MRN: [id number]  Referring provider: Gildardo Queen*  Dx:   Encounter Diagnosis     ICD-10-CM    1. Closed fracture of distal ends of right radius and ulna, sequela  S52.501S Ambulatory Referral to PT/OT Hand Therapy    S52.601S                      Assessment  Impairments: abnormal or restricted ROM, impaired physical strength, lacks appropriate home exercise program and pain with function    Assessment details: Patient is a 69 y.o. RHD female who presents for OT IE and treatment for RUE DR fracture. Patient reports falling on outstretched hand losing her balance. Patient treated conservatively with casting. Patient referred by Dr. Queen to initiate treatment including hand therapy.    Understanding of Dx/Px/POC: good     Prognosis: good    Goals  Short term goals 2-4 weeks  Establish HEP to enhance performance with ADLs.    Improve active range of motion of RUE by 20  to assist with UE dressing.     In crease  strength by 10 lbs. to enhance gripping hand tools.       Long Term goals by discharge  Establish final home exercise program to enhance maximal functional level with ADLs.    Achieve functional active range of motion of RUE for full return to household chores.       Achieve functional strength of RUE for full return to high level ADLs.          Plan  Patient would benefit from: OT eval, skilled occupational therapy, orthotics and custom splinting  Planned modality interventions: thermotherapy: hydrocollator packs, cryotherapy, TENS, unattended electrical stimulation, ultrasound and electrical stimulation/Russian stimulation    Planned therapy interventions: manual therapy, joint mobilization, strengthening, stretching, therapeutic activities, therapeutic exercise, home exercise program, graded exercise, graded activity, functional ROM exercises, flexibility, orthotic fitting/training, IASTM and  kinesiology taping    Frequency: 1-2x week  Duration in weeks: 10  Plan of Care beginning date: 2025  Plan of Care expiration date: 2025  Treatment plan discussed with: patient        Subjective Evaluation    History of Present Illness  Date of onset: 2024  Mechanism of injury: Patient is a 69 y.o. RHD female who presents for OT IE and treatment for RUE DR fracture. Patient reports falling on outstretched hand losing her balance. Patient treated conservatively with casting. Patient referred by Dr. Queen to initiate treatment including hand therapy.    Pain  Current pain ratin  At best pain ratin  At worst pain rating: 10  Quality: sharp        Objective     Active Range of Motion     Left Elbow   Forearm supination: 67 degrees   Forearm pronation: 81 degrees     Right Elbow   Forearm supination: 48 degrees   Forearm pronation: 70 degrees     Left Wrist   Wrist flexion: 68 degrees   Wrist extension: 64 degrees   Radial deviation: 15 degrees   Ulnar deviation: 35 degrees     Right Wrist   Wrist flexion: 50 degrees   Wrist extension: 39 degrees   Radial deviation: 17 degrees   Ulnar deviation: 15 degrees     Strength/Myotome Testing     Left Wrist/Hand      (2nd hand position)     Trial 1: 59    Right Wrist/Hand      (2nd hand position)     Trial 1: 29               Assessment:   Patient tolerated session well. Patient demonstrates ROM and strength deficits upon assessment today. Patient session focused on patient education on anatomy and physiology concerning current dx, techniques for decreasing deficits through HEP, and appropriate use of modalities. Patient educated on HEP to include modalities and ROM TE  with verbal instructions and handouts for patient reference. Patient educated on treatment plan at this time. Patient benefiting from skilled hand therapy OT to reduce deficits to improve independence with daily activities      Plan:   Focus on ROM to improve ability to complete  daily activites with ease.  POC 2/17/25- 4/28/25    No auth BOMN    Visit Tracker  Date 2/17                                                                                    PMHx:   has a past medical history of Asthma exacerbation (09/05/2024), Cardiac disease, Diabetes mellitus (HCC), GERD (gastroesophageal reflux disease), Hypotension (09/06/2024), Severe sepsis (HCC) (09/05/2024), and Tachycardia (09/07/2024).    Precautions:   For all exercises and treatment interventions listed below; patient educated to complete within tolerance and pain threshold. Patient educated if symptoms increase or pain becomes intolerable they should discontinue and/or reduce intensity/frequency.     Manuals HEP 2/17/2025                       Ther Ex     Education on HEP and dx  x5min   AROM tendon glides x x10   AROM table top extension x x10   AROM digit add/abduction x x10   AROM wrist flex/ext/RD/UD x x10   AROM forearm rotation x x10   PROM wrist flex/ext x x3 10 sec             Ther Act                     Modalities     MHP  5 min

## 2025-02-24 ENCOUNTER — OFFICE VISIT (OUTPATIENT)
Dept: OCCUPATIONAL THERAPY | Facility: CLINIC | Age: 70
End: 2025-02-24
Payer: COMMERCIAL

## 2025-02-24 DIAGNOSIS — S52.601S CLOSED FRACTURE OF DISTAL ENDS OF RIGHT RADIUS AND ULNA, SEQUELA: Primary | ICD-10-CM

## 2025-02-24 DIAGNOSIS — S52.501D CLOSED FRACTURE OF DISTAL ENDS OF RIGHT RADIUS AND ULNA WITH ROUTINE HEALING, SUBSEQUENT ENCOUNTER: ICD-10-CM

## 2025-02-24 DIAGNOSIS — S52.501S CLOSED FRACTURE OF DISTAL ENDS OF RIGHT RADIUS AND ULNA, SEQUELA: Primary | ICD-10-CM

## 2025-02-24 DIAGNOSIS — S52.501D CLOSED FRACTURE OF DISTAL END OF RIGHT RADIUS WITH ROUTINE HEALING, UNSPECIFIED FRACTURE MORPHOLOGY, SUBSEQUENT ENCOUNTER: ICD-10-CM

## 2025-02-24 DIAGNOSIS — S52.601D CLOSED FRACTURE OF DISTAL ENDS OF RIGHT RADIUS AND ULNA WITH ROUTINE HEALING, SUBSEQUENT ENCOUNTER: ICD-10-CM

## 2025-02-24 PROCEDURE — 97110 THERAPEUTIC EXERCISES: CPT

## 2025-02-24 PROCEDURE — 97530 THERAPEUTIC ACTIVITIES: CPT

## 2025-02-24 NOTE — PROGRESS NOTES
Daily Note     Today's date: 2025  Patient name: Anna Ly  : 1955  MRN: [id number]  Referring provider: Gildardo Queen*  Dx:   Encounter Diagnosis     ICD-10-CM    1. Closed fracture of distal ends of right radius and ulna, sequela  S52.501S     S52.601S       2. Closed fracture of distal end of right radius with routine healing, unspecified fracture morphology, subsequent encounter  S52.501D       3. Closed fracture of distal ends of right radius and ulna with routine healing, subsequent encounter  S52.501D     S52.601D                      Subjective: Patient notes the wrist is feeling much better.       Objective: See treatment diary below       Assessment:   Patient tolerated session well. Session focused on ROM to improve deficits and functional performance with daily activities. Patient tolerated all TE/TA with no complaints. Patient progressing well towards goals. Patient benefiting from skilled hand therapy OT to reduce deficits to improve independence with daily activities.         Plan:   Focus on ROM to improve ability to complete daily activites with ease.  POC 25- 25    No auth BOMN    Visit Tracker  Date                                                                                    PMHx:   has a past medical history of Asthma exacerbation (2024), Cardiac disease, Diabetes mellitus (HCC), GERD (gastroesophageal reflux disease), Hypotension (2024), Severe sepsis (HCC) (2024), and Tachycardia (2024).    Precautions:   For all exercises and treatment interventions listed below; patient educated to complete within tolerance and pain threshold. Patient educated if symptoms increase or pain becomes intolerable they should discontinue and/or reduce intensity/frequency.     Manuals HEP 2025                        Ther Ex     Education on HEP and dx  x5min   AROM tendon glides x x10   AROM table top extension x x10   AROM digit  add/abduction x x10   AROM wrist flex/ext/RD/UD x x10   AROM forearm rotation x x10   PROM wrist flex/ext x x3 10 sec             Ther Act      pronator  x10   Wrist maze  x10   Dice IHM/stacking  x30   Towel scrunch and squeeze  x10                  Modalities     MHP  5 min

## 2025-03-03 ENCOUNTER — OFFICE VISIT (OUTPATIENT)
Dept: OCCUPATIONAL THERAPY | Facility: CLINIC | Age: 70
End: 2025-03-03
Payer: COMMERCIAL

## 2025-03-03 DIAGNOSIS — S52.501D CLOSED FRACTURE OF DISTAL ENDS OF RIGHT RADIUS AND ULNA WITH ROUTINE HEALING, SUBSEQUENT ENCOUNTER: ICD-10-CM

## 2025-03-03 DIAGNOSIS — S52.601D CLOSED FRACTURE OF DISTAL ENDS OF RIGHT RADIUS AND ULNA WITH ROUTINE HEALING, SUBSEQUENT ENCOUNTER: ICD-10-CM

## 2025-03-03 DIAGNOSIS — S52.601S CLOSED FRACTURE OF DISTAL ENDS OF RIGHT RADIUS AND ULNA, SEQUELA: Primary | ICD-10-CM

## 2025-03-03 DIAGNOSIS — S52.501D CLOSED FRACTURE OF DISTAL END OF RIGHT RADIUS WITH ROUTINE HEALING, UNSPECIFIED FRACTURE MORPHOLOGY, SUBSEQUENT ENCOUNTER: ICD-10-CM

## 2025-03-03 DIAGNOSIS — S52.501S CLOSED FRACTURE OF DISTAL ENDS OF RIGHT RADIUS AND ULNA, SEQUELA: Primary | ICD-10-CM

## 2025-03-03 PROCEDURE — 97110 THERAPEUTIC EXERCISES: CPT

## 2025-03-03 PROCEDURE — 97530 THERAPEUTIC ACTIVITIES: CPT

## 2025-03-03 NOTE — PROGRESS NOTES
Daily Note     Today's date: 3/3/2025  Patient name: Anna Ly  : 1955  MRN: [id number]  Referring provider: Gildardo Queen*  Dx:   Encounter Diagnosis     ICD-10-CM    1. Closed fracture of distal ends of right radius and ulna, sequela  S52.501S     S52.601S       2. Closed fracture of distal end of right radius with routine healing, unspecified fracture morphology, subsequent encounter  S52.501D       3. Closed fracture of distal ends of right radius and ulna with routine healing, subsequent encounter  S52.501D     S52.601D                      Subjective: Patient notes the wrist is feeling much better.       Objective: See treatment diary below       Assessment:   Patient tolerated session well. Session focused on ROM to improve deficits and functional performance with daily activities. Patient tolerated all TE/TA with no complaints. Patient progressing well towards goals. Patient benefiting from skilled hand therapy OT to reduce deficits to improve independence with daily activities.         Plan:   Focus on ROM to improve ability to complete daily activites with ease.  POC 25- 25    No auth BOMN    Visit Tracker  Date  3/3                                                                                  PMHx:   has a past medical history of Asthma exacerbation (2024), Cardiac disease, Diabetes mellitus (HCC), GERD (gastroesophageal reflux disease), Hypotension (2024), Severe sepsis (HCC) (2024), and Tachycardia (2024).    Precautions:   For all exercises and treatment interventions listed below; patient educated to complete within tolerance and pain threshold. Patient educated if symptoms increase or pain becomes intolerable they should discontinue and/or reduce intensity/frequency.     Manuals HEP 3/3/2025                        Ther Ex     Education on HEP and dx  x5min   AROM tendon glides x x10   AROM table top extension x x10   AROM digit  add/abduction x x10   AROM wrist flex/ext/RD/UD x x10   AROM forearm rotation x x10   PROM wrist flex/ext x x3 10 sec   Digit ext band  X10 yellow        Ther Act      pronator  X10 1/2lbs   Wrist maze  x10   Dice IHM/stacking  x30   Towel scrunch and squeeze  x10   gripper  Sponge bucket level1              Modalities     MHP  5 min

## 2025-03-10 ENCOUNTER — OFFICE VISIT (OUTPATIENT)
Dept: OCCUPATIONAL THERAPY | Facility: CLINIC | Age: 70
End: 2025-03-10
Payer: COMMERCIAL

## 2025-03-10 DIAGNOSIS — S52.601D CLOSED FRACTURE OF DISTAL ENDS OF RIGHT RADIUS AND ULNA WITH ROUTINE HEALING, SUBSEQUENT ENCOUNTER: ICD-10-CM

## 2025-03-10 DIAGNOSIS — S52.601S CLOSED FRACTURE OF DISTAL ENDS OF RIGHT RADIUS AND ULNA, SEQUELA: Primary | ICD-10-CM

## 2025-03-10 DIAGNOSIS — S52.501D CLOSED FRACTURE OF DISTAL END OF RIGHT RADIUS WITH ROUTINE HEALING, UNSPECIFIED FRACTURE MORPHOLOGY, SUBSEQUENT ENCOUNTER: ICD-10-CM

## 2025-03-10 DIAGNOSIS — S52.501S CLOSED FRACTURE OF DISTAL ENDS OF RIGHT RADIUS AND ULNA, SEQUELA: Primary | ICD-10-CM

## 2025-03-10 DIAGNOSIS — S52.501D CLOSED FRACTURE OF DISTAL ENDS OF RIGHT RADIUS AND ULNA WITH ROUTINE HEALING, SUBSEQUENT ENCOUNTER: ICD-10-CM

## 2025-03-10 PROCEDURE — 97110 THERAPEUTIC EXERCISES: CPT

## 2025-03-10 PROCEDURE — 97530 THERAPEUTIC ACTIVITIES: CPT

## 2025-03-10 NOTE — PROGRESS NOTES
Daily Note     Today's date: 3/10/2025  Patient name: Anna Ly  : 1955  MRN: [id number]  Referring provider: Gildardo Queen*  Dx:   Encounter Diagnosis     ICD-10-CM    1. Closed fracture of distal ends of right radius and ulna, sequela  S52.501S     S52.601S       2. Closed fracture of distal end of right radius with routine healing, unspecified fracture morphology, subsequent encounter  S52.501D       3. Closed fracture of distal ends of right radius and ulna with routine healing, subsequent encounter  S52.501D     S52.601D                      Subjective: Patient notes she didn't notice any discomfort at all using the hand this morning.       Objective: See treatment diary below       Assessment:   Patient tolerated session well. Session focused on ROM to improve deficits and functional performance with daily activities. Patient tolerated all TE/TA with no complaints. Patient progressing well towards goals. Patient benefiting from skilled hand therapy OT to reduce deficits to improve independence with daily activities.         Plan:   Focus on ROM to improve ability to complete daily activites with ease.  POC 25- 25    No auth BOMN    Visit Tracker  Date 2/17 2/24 3/3 3/10                                                                                 PMHx:   has a past medical history of Asthma exacerbation (2024), Cardiac disease, Diabetes mellitus (HCC), GERD (gastroesophageal reflux disease), Hypotension (2024), Severe sepsis (HCC) (2024), and Tachycardia (2024).    Precautions:   For all exercises and treatment interventions listed below; patient educated to complete within tolerance and pain threshold. Patient educated if symptoms increase or pain becomes intolerable they should discontinue and/or reduce intensity/frequency.     Manuals HEP 3/10/2025                        Ther Ex     Education on HEP and dx  x5min   AROM tendon glides x x10   AROM  table top extension x x10   AROM digit add/abduction x x10   AROM wrist flex/ext/RD/UD x x10   AROM forearm rotation x x10   PROM wrist flex/ext x x3 10 sec   Digit ext band  X10 red   PREs wrist flex/ext/RD  X10 2 lbs              Ther Act      pronator  X10 1/2lbs   Wrist maze  x10   Dice IHM/stacking  x30   Towel scrunch and squeeze  x10   gripper  Sponge bucket level 2             Modalities     MHP  5 min

## 2025-03-17 ENCOUNTER — OFFICE VISIT (OUTPATIENT)
Dept: OCCUPATIONAL THERAPY | Facility: CLINIC | Age: 70
End: 2025-03-17
Payer: COMMERCIAL

## 2025-03-17 DIAGNOSIS — S52.501D CLOSED FRACTURE OF DISTAL ENDS OF RIGHT RADIUS AND ULNA WITH ROUTINE HEALING, SUBSEQUENT ENCOUNTER: ICD-10-CM

## 2025-03-17 DIAGNOSIS — S52.601S CLOSED FRACTURE OF DISTAL ENDS OF RIGHT RADIUS AND ULNA, SEQUELA: Primary | ICD-10-CM

## 2025-03-17 DIAGNOSIS — S52.501S CLOSED FRACTURE OF DISTAL ENDS OF RIGHT RADIUS AND ULNA, SEQUELA: Primary | ICD-10-CM

## 2025-03-17 DIAGNOSIS — S52.501D CLOSED FRACTURE OF DISTAL END OF RIGHT RADIUS WITH ROUTINE HEALING, UNSPECIFIED FRACTURE MORPHOLOGY, SUBSEQUENT ENCOUNTER: ICD-10-CM

## 2025-03-17 DIAGNOSIS — S52.601D CLOSED FRACTURE OF DISTAL ENDS OF RIGHT RADIUS AND ULNA WITH ROUTINE HEALING, SUBSEQUENT ENCOUNTER: ICD-10-CM

## 2025-03-17 PROCEDURE — 97530 THERAPEUTIC ACTIVITIES: CPT

## 2025-03-17 PROCEDURE — 97110 THERAPEUTIC EXERCISES: CPT

## 2025-03-17 NOTE — PROGRESS NOTES
Daily Note     Today's date: 3/17/2025  Patient name: Anna Ly  : 1955  MRN: [id number]  Referring provider: Gildardo Queen*  Dx:   Encounter Diagnosis     ICD-10-CM    1. Closed fracture of distal ends of right radius and ulna, sequela  S52.501S     S52.601S       2. Closed fracture of distal end of right radius with routine healing, unspecified fracture morphology, subsequent encounter  S52.501D       3. Closed fracture of distal ends of right radius and ulna with routine healing, subsequent encounter  S52.501D     S52.601D                      Subjective: Patient notes she didn't notice any discomfort at all using the hand this morning.       Objective: See treatment diary below       Assessment:   Patient tolerated session well. Session focused on ROM to improve deficits and functional performance with daily activities. Patient tolerated all TE/TA with no complaints. Patient progressing well towards goals. Patient benefiting from skilled hand therapy OT to reduce deficits to improve independence with daily activities.         Plan:   Focus on ROM to improve ability to complete daily activites with ease.  POC 25- 25    No auth BOMN    Visit Tracker  Date 2/17 2/24 3/3 3/10 3/17                                                                                PMHx:   has a past medical history of Asthma exacerbation (2024), Cardiac disease, Diabetes mellitus (HCC), GERD (gastroesophageal reflux disease), Hypotension (2024), Severe sepsis (HCC) (2024), and Tachycardia (2024).    Precautions:   For all exercises and treatment interventions listed below; patient educated to complete within tolerance and pain threshold. Patient educated if symptoms increase or pain becomes intolerable they should discontinue and/or reduce intensity/frequency.     Manuals HEP 3/17/2025                        Ther Ex     Education on HEP and dx  x5min   AROM tendon glides x x10    AROM table top extension x x10   AROM digit add/abduction x x10   AROM wrist flex/ext/RD/UD x x10   AROM forearm rotation x x10   PROM wrist flex/ext x x3 10 sec   Digit ext band  X10 red   PREs wrist flex/ext/RD  X10 2 lbs              Ther Act      pronator  X10 1/2lbs   Wrist maze  x10   Dice IHM/stacking  x30   Towel scrunch and squeeze  x10   Flexbar all planes  X10 red   gripper  Sponge bucket level 2             Modalities     MHP  5 min

## 2025-03-24 ENCOUNTER — OFFICE VISIT (OUTPATIENT)
Dept: OCCUPATIONAL THERAPY | Facility: CLINIC | Age: 70
End: 2025-03-24
Payer: COMMERCIAL

## 2025-03-24 DIAGNOSIS — S52.601D CLOSED FRACTURE OF DISTAL ENDS OF RIGHT RADIUS AND ULNA WITH ROUTINE HEALING, SUBSEQUENT ENCOUNTER: ICD-10-CM

## 2025-03-24 DIAGNOSIS — S52.501S CLOSED FRACTURE OF DISTAL ENDS OF RIGHT RADIUS AND ULNA, SEQUELA: Primary | ICD-10-CM

## 2025-03-24 DIAGNOSIS — S52.501D CLOSED FRACTURE OF DISTAL END OF RIGHT RADIUS WITH ROUTINE HEALING, UNSPECIFIED FRACTURE MORPHOLOGY, SUBSEQUENT ENCOUNTER: ICD-10-CM

## 2025-03-24 DIAGNOSIS — S52.501D CLOSED FRACTURE OF DISTAL ENDS OF RIGHT RADIUS AND ULNA WITH ROUTINE HEALING, SUBSEQUENT ENCOUNTER: ICD-10-CM

## 2025-03-24 DIAGNOSIS — S52.601S CLOSED FRACTURE OF DISTAL ENDS OF RIGHT RADIUS AND ULNA, SEQUELA: Primary | ICD-10-CM

## 2025-03-24 PROCEDURE — 97530 THERAPEUTIC ACTIVITIES: CPT

## 2025-03-24 PROCEDURE — 97110 THERAPEUTIC EXERCISES: CPT

## 2025-03-24 NOTE — PROGRESS NOTES
Daily Note     Today's date: 3/24/2025  Patient name: Anna Ly  : 1955  MRN: [id number]  Referring provider: Gildardo Queen*  Dx:   Encounter Diagnosis     ICD-10-CM    1. Closed fracture of distal ends of right radius and ulna, sequela  S52.501S     S52.601S       2. Closed fracture of distal ends of right radius and ulna with routine healing, subsequent encounter  S52.501D     S52.601D       3. Closed fracture of distal end of right radius with routine healing, unspecified fracture morphology, subsequent encounter  S52.501D                      Subjective: Patient notes she's been taking the brace off a little more with little to know pain using the hand around the house.       Objective: See treatment diary below       Assessment:   Patient tolerated session well. Session focused on ROM and gentle progressive strengthneing to improve deficits and functional performance with daily activities. Patient tolerated all TE/TA with no complaints. Patient progressing well towards goals. Patient benefiting from skilled hand therapy OT to reduce deficits to improve independence with daily activities.         Plan:   Focus on ROM and gentle progressive strengthening to improve ability to complete daily activites with ease.  POC 25- 25    No auth BOMN    Visit Tracker  Date 2/17 2/24 3/3 3/10 3/17 3/24                                                                               PMHx:   has a past medical history of Asthma exacerbation (2024), Cardiac disease, Diabetes mellitus (HCC), GERD (gastroesophageal reflux disease), Hypotension (2024), Severe sepsis (HCC) (2024), and Tachycardia (2024).    Precautions:   For all exercises and treatment interventions listed below; patient educated to complete within tolerance and pain threshold. Patient educated if symptoms increase or pain becomes intolerable they should discontinue and/or reduce intensity/frequency.      Manuals HEP 3/24/2025                        Ther Ex     Education on HEP and dx  x5min   AROM tendon glides x x10   AROM table top extension x x10   AROM digit add/abduction x x10   AROM wrist flex/ext/RD/UD x x10   AROM forearm rotation x x10   PROM wrist flex/ext x x3 10 sec   Digit ext band  X10 red   PREs wrist flex/ext/RD  X10 3 lbs    Web press  X10 red/blue        Ther Act      pronator  X10 1lbs   Wrist maze  x10   Dice IHM/stacking  x30   Towel scrunch and squeeze  x10   Flexbar all planes  X10 red   gripper  Sponge bucket level 2             Modalities     MHP  5 min

## 2025-03-31 ENCOUNTER — APPOINTMENT (OUTPATIENT)
Dept: OCCUPATIONAL THERAPY | Facility: CLINIC | Age: 70
End: 2025-03-31
Payer: COMMERCIAL

## 2025-04-07 ENCOUNTER — APPOINTMENT (OUTPATIENT)
Dept: RADIOLOGY | Facility: CLINIC | Age: 70
End: 2025-04-07
Payer: COMMERCIAL

## 2025-04-07 ENCOUNTER — OFFICE VISIT (OUTPATIENT)
Dept: OCCUPATIONAL THERAPY | Facility: CLINIC | Age: 70
End: 2025-04-07
Payer: COMMERCIAL

## 2025-04-07 ENCOUNTER — OFFICE VISIT (OUTPATIENT)
Dept: OBGYN CLINIC | Facility: CLINIC | Age: 70
End: 2025-04-07
Payer: COMMERCIAL

## 2025-04-07 DIAGNOSIS — S52.501S CLOSED FRACTURE OF DISTAL ENDS OF RIGHT RADIUS AND ULNA, SEQUELA: Primary | ICD-10-CM

## 2025-04-07 DIAGNOSIS — S52.501D CLOSED FRACTURE OF DISTAL ENDS OF RIGHT RADIUS AND ULNA WITH ROUTINE HEALING, SUBSEQUENT ENCOUNTER: ICD-10-CM

## 2025-04-07 DIAGNOSIS — S52.601S CLOSED FRACTURE OF DISTAL ENDS OF RIGHT RADIUS AND ULNA, SEQUELA: Primary | ICD-10-CM

## 2025-04-07 DIAGNOSIS — S52.601D CLOSED FRACTURE OF DISTAL ENDS OF RIGHT RADIUS AND ULNA WITH ROUTINE HEALING, SUBSEQUENT ENCOUNTER: ICD-10-CM

## 2025-04-07 DIAGNOSIS — S52.501D CLOSED FRACTURE OF DISTAL END OF RIGHT RADIUS WITH ROUTINE HEALING, UNSPECIFIED FRACTURE MORPHOLOGY, SUBSEQUENT ENCOUNTER: ICD-10-CM

## 2025-04-07 PROCEDURE — 97530 THERAPEUTIC ACTIVITIES: CPT

## 2025-04-07 PROCEDURE — 97110 THERAPEUTIC EXERCISES: CPT

## 2025-04-07 PROCEDURE — 99213 OFFICE O/P EST LOW 20 MIN: CPT | Performed by: STUDENT IN AN ORGANIZED HEALTH CARE EDUCATION/TRAINING PROGRAM

## 2025-04-07 NOTE — PROGRESS NOTES
Daily Note     Today's date: 2025  Patient name: Anna Ly  : 1955  MRN: [id number]  Referring provider: Gildardo Queen*  Dx:   Encounter Diagnosis     ICD-10-CM    1. Closed fracture of distal ends of right radius and ulna, sequela  S52.501S     S52.601S       2. Closed fracture of distal end of right radius with routine healing, unspecified fracture morphology, subsequent encounter  S52.501D       3. Closed fracture of distal ends of right radius and ulna with routine healing, subsequent encounter  S52.501D     S52.601D                      Subjective: Patient notes its been feeling really good.  She notes she did not use her brace while on vacation.      Objective: See treatment diary below       Assessment:   Patient tolerated session well. Session focused on ROM and gentle progressive strengthneing to improve deficits and functional performance with daily activities. Patient tolerated all TE/TA with no complaints. Patient progressing well towards goals. Patient benefiting from skilled hand therapy OT to reduce deficits to improve independence with daily activities.         Plan:   Focus on ROM and gentle progressive strengthening to improve ability to complete daily activites with ease.  POC 25- 25    No auth BOMN    Visit Tracker  Date 2/17 2/24 3/3 3/10 3/17 3/24    4/7                                                                           PMHx:   has a past medical history of Asthma exacerbation (2024), Cardiac disease, Diabetes mellitus (HCC), GERD (gastroesophageal reflux disease), Hypotension (2024), Severe sepsis (HCC) (2024), and Tachycardia (2024).    Precautions:   For all exercises and treatment interventions listed below; patient educated to complete within tolerance and pain threshold. Patient educated if symptoms increase or pain becomes intolerable they should discontinue and/or reduce intensity/frequency.     Manuals HEP 2025                         Ther Ex     Education on HEP and dx  x5min   AROM tendon glides x x10   AROM table top extension x x10   AROM digit add/abduction x x10   AROM wrist flex/ext/RD/UD x x10   AROM forearm rotation x x10   PROM wrist flex/ext x x3 10 sec   Digit ext band  X10 red   PREs wrist flex/ext/RD  X10 3 lbs    Web press  X10 red/blue        Ther Act      pronator  X10 1lbs   Wrist maze  x10   Dice IHM/stacking  x30   Towel scrunch and squeeze  x10   Flexbar all planes  X10 red   gripper  Sponge bucket level 2             Modalities     MHP  5 min

## 2025-04-07 NOTE — PROGRESS NOTES
ORTHOPAEDIC HAND, WRIST, AND ELBOW OFFICE  VISIT     Name: Anna Ly      : 1955      MRN: [id number]  Encounter Provider: Gildardo Queen MD  Encounter Date: 2025   Encounter department: [facility]  :  Assessment & Plan  Closed fracture of distal ends of right radius and ulna, sequela    Orders:    XR wrist 3+ vw right; Future             ASSESSMENT/PLAN:    Anna Ly is a 70 y.o. RHD female who presents for follow up evaluation of right distal radius fracture sustained 24    - xrays reviewed with patient demonstrating healing fracture without displacement.  - Discussed with patient she may now return to activities as tolerated  - No further acute intervention. OK for OT to progress to HEP as needed otherwise work on gradual motion and strengthening at home.           Follow Up:  As needed if symptoms fail to improve or worsen  ____________________________________________________________________________________________________________________________________________      CHIEF COMPLAINT:  Right wrist pain     SUBJECTIVE:  Anna Ly is a 70 y.o. female who presents for follow up evaluation of right wrist fracture sustained 24. Patient was transitioned to a splint at her last visit, she has been compliant with physical therapy.     I have personally reviewed all the relevant PMH, PSH, SH, FH, Medications and allergies      PAST MEDICAL HISTORY:  Past Medical History:   Diagnosis Date    Asthma exacerbation 2024    Cardiac disease     Diabetes mellitus (HCC)     GERD (gastroesophageal reflux disease)     Hypotension 2024    Severe sepsis (HCC) 2024    Tachycardia 2024       PAST SURGICAL HISTORY:  Past Surgical History:   Procedure Laterality Date    CARDIAC PACEMAKER PLACEMENT       SECTION      x 2    JOINT REPLACEMENT         FAMILY HISTORY:  Family History   Problem Relation Age of Onset     Parkinsonism Mother     No Known Problems Father     No Known Problems Brother     No Known Problems Maternal Aunt     No Known Problems Maternal Uncle     No Known Problems Paternal Aunt     No Known Problems Paternal Uncle     No Known Problems Maternal Grandmother     No Known Problems Maternal Grandfather     Breast cancer Paternal Grandmother 60    No Known Problems Paternal Grandfather     No Known Problems Daughter     No Known Problems Maternal Aunt     No Known Problems Paternal Aunt     ADD / ADHD Neg Hx     Anesthesia problems Neg Hx     Cancer Neg Hx     Clotting disorder Neg Hx     Collagen disease Neg Hx     Diabetes Neg Hx     Dislocations Neg Hx     Learning disabilities Neg Hx     Neurological problems Neg Hx     Osteoporosis Neg Hx     Rheumatologic disease Neg Hx     Scoliosis Neg Hx     Vascular Disease Neg Hx        SOCIAL HISTORY:  Social History     Tobacco Use    Smoking status: Never    Smokeless tobacco: Never   Vaping Use    Vaping status: Never Used   Substance Use Topics    Alcohol use: Yes     Comment: socially    Drug use: No       MEDICATIONS:    Current Outpatient Medications:     Ascorbic Acid (vitamin C) 1000 MG tablet, Take 1,000 mg by mouth daily, Disp: , Rfl:     aspirin 81 mg chewable tablet, Chew 81 mg daily., Disp: , Rfl:     cyanocobalamin (VITAMIN B-12) 2000 MCG tablet, Take 2,000 mcg by mouth daily, Disp: , Rfl:     DULoxetine (CYMBALTA) 60 mg delayed release capsule, , Disp: , Rfl:     Empagliflozin (Jardiance) 25 MG TABS, Take 25 mg by mouth every morning, Disp: , Rfl:     gabapentin (NEURONTIN) 300 mg capsule, , Disp: , Rfl:     JANUMET  MG per tablet, , Disp: , Rfl:     Lactobacillus (Acidophilus) 100 MG CAPS, Take by mouth in the morning, Disp: , Rfl:     losartan (COZAAR) 25 mg tablet, Take 25 mg by mouth daily, Disp: , Rfl:     pantoprazole (PROTONIX) 40 mg tablet, , Disp: , Rfl:     simvastatin (ZOCOR) 20 mg tablet, Take 20 mg by mouth daily at bedtime.,  Disp: , Rfl:     metoprolol tartrate (LOPRESSOR) 25 mg tablet, Take 1 tablet (25 mg total) by mouth every 8 (eight) hours, Disp: 90 tablet, Rfl: 0    ALLERGIES:  Allergies   Allergen Reactions    Codeine GI Intolerance    Morphine     Penicillins Other (See Comments)     unknown         REVIEW OF SYSTEMS:  Pertinent items are noted in HPI.  A comprehensive review of systems was negative.    VITALS:  There were no vitals filed for this visit.    LABS:  HgA1c:   Lab Results   Component Value Date    HGBA1C 7.0 (H) 09/05/2024     BMP:   Lab Results   Component Value Date    CALCIUM 8.6 09/09/2024    K 4.1 09/09/2024    CO2 22 09/09/2024     09/09/2024    BUN 8 09/09/2024    CREATININE 1.01 09/09/2024       _____________________________________________________  PHYSICAL EXAMINATION:  General: well developed and well nourished, alert, oriented times 3, and appears comfortable  Psychiatric: Normal  HEENT: Normocephalic, Atraumatic Trachea Midline, No torticollis  Pulmonary: No audible wheezing or respiratory distress   Abdomen/GI: Non tender, non distended   Cardiovascular: Regular Rate and Rhythm. No pitting edema, 2+ radial pulse   Skin: No masses, erythema, lacerations, fluctation, ulcerations  Neurovascular: Sensation Intact to the Median, Ulnar, Radial Nerve, Motor Intact to the Median, Ulnar, Radial Nerve, and Pulses Intact  Musculoskeletal: Normal, except as noted in detailed exam and in HPI.        FOCUSED MUSCULOSKELETAL EXAMINATION:  Right Upper Extremity  Inspection: skin intact  Palpation:  No TTP  Neurologic: 5/5 elbow flexion, 5/5 elbow extension, 4/5 wrist extension, 4/5 wrist flexion, 3/5 finger flexion, 3/5 finger extension, 5/5 FPL, 5/5 EPL, 5/5 APB, 5/5 intrinsics, sensation intact to median, radial, and ulnar nerve distributions  Vascular: Palpable radial pulse, brisk cap refill <2sec, hand warm and well perfused  MSK:   Mild generalized swelling to wrist and hand   No TTP distal radius    Painless active and passive ROM  ___________________________________________________  STUDIES REVIEWED:  Xrays of the right wrist were obtained on 4/7/25 were independently reviewed which demonstrates stable distal radius fracture, in maintained alignment, with interval callus      LABS REVIEWED:    HgA1c:   Lab Results   Component Value Date    HGBA1C 7.0 (H) 09/05/2024     BMP:   Lab Results   Component Value Date    CALCIUM 8.6 09/09/2024    K 4.1 09/09/2024    CO2 22 09/09/2024     09/09/2024    BUN 8 09/09/2024    CREATININE 1.01 09/09/2024               PROCEDURES PERFORMED:  Procedures  No Procedures performed today    _____________________________________________________      Scribe Attestation      I,:  Dara Valerio am acting as a scribe while in the presence of the attending physician.:       I,:  Gildardo Queen MD personally performed the services described in this documentation    as scribed in my presence.:               I agree with the history, physical examination, assessment and plan of care as documented above.    Gildardo Queen M.D.  Attending, Orthopaedic Surgery  Hand, Wrist, and Elbow Surgery  [facility]

## 2025-04-14 ENCOUNTER — OFFICE VISIT (OUTPATIENT)
Dept: OCCUPATIONAL THERAPY | Facility: CLINIC | Age: 70
End: 2025-04-14
Payer: COMMERCIAL

## 2025-04-14 DIAGNOSIS — S52.501D CLOSED FRACTURE OF DISTAL ENDS OF RIGHT RADIUS AND ULNA WITH ROUTINE HEALING, SUBSEQUENT ENCOUNTER: Primary | ICD-10-CM

## 2025-04-14 DIAGNOSIS — S52.601D CLOSED FRACTURE OF DISTAL ENDS OF RIGHT RADIUS AND ULNA WITH ROUTINE HEALING, SUBSEQUENT ENCOUNTER: Primary | ICD-10-CM

## 2025-04-14 PROCEDURE — 97110 THERAPEUTIC EXERCISES: CPT

## 2025-04-14 NOTE — PROGRESS NOTES
"Daily Note     Today's date: 2025  Patient name: Anna Ly  : 1955  MRN: [id number]  Referring provider: Gildardo Queen*  Dx:   Encounter Diagnosis     ICD-10-CM    1. Closed fracture of distal ends of right radius and ulna with routine healing, subsequent encounter  S52.501D     S52.601V                        Subjective: Patient notes wrist feels \"really good\" able to perform all activities with no difficulty at this time and offers no other functional changes at this time.      Objective: See treatment diary below       Assessment:   Patient tolerated session well. Session focused on ROM and gentle progressive strengthening to improve deficits and functional performance with daily activities. Patient tolerated all TE/TA with no complaints and overall good activity tolerance. Patient progressing well towards goals. Patient benefiting from skilled hand therapy OT to reduce deficits to improve independence with daily activities.         Plan:   Focus on ROM and gentle progressive strengthening to improve ability to complete daily activites with ease.  POC 25- 25      No auth BOMN    Visit Tracker  Date 2/17 2/24 3/3 3/10 3/17 3/24    4/7 4/14                                                                          PMHx:   has a past medical history of Asthma exacerbation (2024), Cardiac disease, Diabetes mellitus (HCC), GERD (gastroesophageal reflux disease), Hypotension (2024), Severe sepsis (HCC) (2024), and Tachycardia (2024).    Precautions:   For all exercises and treatment interventions listed below; patient educated to complete within tolerance and pain threshold. Patient educated if symptoms increase or pain becomes intolerable they should discontinue and/or reduce intensity/frequency.     Manuals HEP 2025                        Ther Ex     Education on HEP and dx  x5min   AROM tendon glides x x10   AROM table top extension x x10   AROM digit " add/abduction x x10   AROM wrist flex/ext/RD/UD x x10   AROM forearm rotation x x10   PROM wrist flex/ext x 3 10 sec   Digit ext band  X10 red   PREs wrist flex/ext/RD  X10 3 lbs    Web press  X10 red/blue        Ther Act      Pronator  X10 1lbs   Wrist maze  x10   Dice IHM/clothespin  Whole jar with red clothespin   Towel scrunch and squeeze  x10   Flexbar all planes  X10 red   Gripper  Sponge bucket level 2             Modalities     MHP  5 min

## 2025-04-21 ENCOUNTER — OFFICE VISIT (OUTPATIENT)
Dept: OCCUPATIONAL THERAPY | Facility: CLINIC | Age: 70
End: 2025-04-21
Payer: COMMERCIAL

## 2025-04-21 DIAGNOSIS — S52.601D CLOSED FRACTURE OF DISTAL ENDS OF RIGHT RADIUS AND ULNA WITH ROUTINE HEALING, SUBSEQUENT ENCOUNTER: Primary | ICD-10-CM

## 2025-04-21 DIAGNOSIS — S52.501D CLOSED FRACTURE OF DISTAL ENDS OF RIGHT RADIUS AND ULNA WITH ROUTINE HEALING, SUBSEQUENT ENCOUNTER: Primary | ICD-10-CM

## 2025-04-21 PROCEDURE — 97110 THERAPEUTIC EXERCISES: CPT

## 2025-04-21 PROCEDURE — 97530 THERAPEUTIC ACTIVITIES: CPT

## 2025-04-21 NOTE — PROGRESS NOTES
Daily Note     Today's date: 2025  Patient name: Anna Ly  : 1955  MRN: [id number]  Referring provider: Gildardo Queen*  Dx:   Encounter Diagnosis     ICD-10-CM    1. Closed fracture of distal ends of right radius and ulna with routine healing, subsequent encounter  S52.501D     S52.601D               Start Time: 1000  Stop Time: 1045  Total time in clinic (min): 45 minutes    Subjective: Patient notes recent follow up with Bret with good progress indicating ready for discharge to [facility].     Objective: See treatment diary below       Assessment:   Patient tolerated session well. Session focused on ROM and gentle progressive strengthening to improve deficits and functional performance with daily activities. Patient tolerated all TE/TA with no complaints and overall good activity tolerance. Patient goals have been met with functional strength and ROM achieved. Patient provided handout and educated on HEP to continue following discharge from skilled OT services.       Plan:   Discharge to [facility], follow up as needed.  POC 25- 25      No auth BOMN    Visit Tracker  Date 2/17 2/24 3/3 3/10 3/17 3/24    4/7 4/14/ 4/21                                                                         PMHx:   has a past medical history of Asthma exacerbation (2024), Cardiac disease, Diabetes mellitus (HCC), GERD (gastroesophageal reflux disease), Hypotension (2024), Severe sepsis (HCC) (2024), and Tachycardia (2024).    Precautions:   For all exercises and treatment interventions listed below; patient educated to complete within tolerance and pain threshold. Patient educated if symptoms increase or pain becomes intolerable they should discontinue and/or reduce intensity/frequency.     Manuals HEP 2025                        Ther Ex     Education on HEP and dx  x5min   AROM tendon glides x x10   AROM table top extension x x10   AROM digit add/abduction x x10   AROM  wrist flex/ext/RD/UD x x10   AROM forearm rotation x x10   PROM wrist flex/ext x 3 10 sec   Digit ext band x X10 red   PREs wrist flex/ext/RD x X10 3 lbs    Web press  X10 red/blue        Ther Act      Pronator  X10 1lbs   Wrist maze  x10   Dice IHM/clothespin  Whole jar with red clothespin   Towel scrunch and squeeze  x10   Flexbar all planes  X10 red   Gripper  Sponge bucket level 2   Putty Ext/squeeze/twist x x10        Modalities     MHP  5 min

## 2025-04-28 ENCOUNTER — APPOINTMENT (OUTPATIENT)
Dept: OCCUPATIONAL THERAPY | Facility: CLINIC | Age: 70
End: 2025-04-28
Payer: COMMERCIAL

## 2025-05-23 ENCOUNTER — TRANSCRIBE ORDERS (OUTPATIENT)
Dept: SLEEP CENTER | Facility: CLINIC | Age: 70
End: 2025-05-23

## 2025-05-23 DIAGNOSIS — G47.10 EXCESSIVE SLEEPINESS: ICD-10-CM

## 2025-05-23 DIAGNOSIS — R51.9 MORNING HEADACHE: ICD-10-CM

## 2025-05-23 DIAGNOSIS — R06.83 SNORING: Primary | ICD-10-CM

## 2025-05-23 DIAGNOSIS — G25.81 RESTLESS LEG SYNDROME: ICD-10-CM

## 2025-05-23 DIAGNOSIS — E66.01 MORBID OBESITY (HCC): ICD-10-CM

## 2025-05-23 DIAGNOSIS — G47.33 OSA (OBSTRUCTIVE SLEEP APNEA): ICD-10-CM

## 2025-07-30 ENCOUNTER — OFFICE VISIT (OUTPATIENT)
Dept: OBGYN CLINIC | Facility: CLINIC | Age: 70
End: 2025-07-30
Payer: COMMERCIAL

## 2025-07-30 ENCOUNTER — APPOINTMENT (OUTPATIENT)
Dept: RADIOLOGY | Facility: CLINIC | Age: 70
End: 2025-07-30
Attending: ORTHOPAEDIC SURGERY
Payer: COMMERCIAL

## 2025-07-30 VITALS — WEIGHT: 215 LBS | HEIGHT: 65 IN | BODY MASS INDEX: 35.82 KG/M2

## 2025-07-30 DIAGNOSIS — S83.412A SPRAIN OF MEDIAL COLLATERAL LIGAMENT OF LEFT KNEE, INITIAL ENCOUNTER: Primary | ICD-10-CM

## 2025-07-30 DIAGNOSIS — M25.562 ACUTE PAIN OF LEFT KNEE: ICD-10-CM

## 2025-07-30 DIAGNOSIS — Z96.652 S/P TOTAL KNEE ARTHROPLASTY, LEFT: ICD-10-CM

## 2025-07-30 PROCEDURE — 73562 X-RAY EXAM OF KNEE 3: CPT

## 2025-07-30 PROCEDURE — 99214 OFFICE O/P EST MOD 30 MIN: CPT | Performed by: ORTHOPAEDIC SURGERY

## 2025-07-30 RX ORDER — SPIRONOLACTONE 25 MG/1
TABLET ORAL
COMMUNITY

## 2025-07-30 RX ORDER — METOPROLOL SUCCINATE 100 MG/1
TABLET, EXTENDED RELEASE ORAL
COMMUNITY
Start: 2025-05-07

## 2025-07-30 RX ORDER — BUPROPION HYDROCHLORIDE 150 MG/1
TABLET ORAL
COMMUNITY
Start: 2025-07-23

## 2025-07-30 RX ORDER — CYCLOBENZAPRINE HCL 10 MG
TABLET ORAL
COMMUNITY
Start: 2025-05-21

## 2025-07-30 RX ORDER — FLUTICASONE PROPIONATE AND SALMETEROL 232; 14 UG/1; UG/1
POWDER, METERED RESPIRATORY (INHALATION)
COMMUNITY